# Patient Record
Sex: FEMALE | Race: WHITE | NOT HISPANIC OR LATINO | Employment: OTHER | URBAN - METROPOLITAN AREA
[De-identification: names, ages, dates, MRNs, and addresses within clinical notes are randomized per-mention and may not be internally consistent; named-entity substitution may affect disease eponyms.]

---

## 2017-07-12 ENCOUNTER — OFFICE VISIT (OUTPATIENT)
Dept: FAMILY MEDICINE | Facility: CLINIC | Age: 72
End: 2017-07-12
Payer: COMMERCIAL

## 2017-07-12 VITALS
BODY MASS INDEX: 23.34 KG/M2 | WEIGHT: 154 LBS | HEIGHT: 68 IN | SYSTOLIC BLOOD PRESSURE: 127 MMHG | TEMPERATURE: 96.5 F | DIASTOLIC BLOOD PRESSURE: 85 MMHG | HEART RATE: 99 BPM

## 2017-07-12 DIAGNOSIS — F34.1 DYSTHYMIA: ICD-10-CM

## 2017-07-12 DIAGNOSIS — Z71.89 COUNSELING REGARDING ADVANCED DIRECTIVES: ICD-10-CM

## 2017-07-12 DIAGNOSIS — Z00.00 ENCOUNTER FOR GENERAL ADULT MEDICAL EXAMINATION WITHOUT ABNORMAL FINDINGS: Primary | ICD-10-CM

## 2017-07-12 PROCEDURE — 99397 PER PM REEVAL EST PAT 65+ YR: CPT | Performed by: INTERNAL MEDICINE

## 2017-07-12 ASSESSMENT — ANXIETY QUESTIONNAIRES
2. NOT BEING ABLE TO STOP OR CONTROL WORRYING: NOT AT ALL
IF YOU CHECKED OFF ANY PROBLEMS ON THIS QUESTIONNAIRE, HOW DIFFICULT HAVE THESE PROBLEMS MADE IT FOR YOU TO DO YOUR WORK, TAKE CARE OF THINGS AT HOME, OR GET ALONG WITH OTHER PEOPLE: SOMEWHAT DIFFICULT
GAD7 TOTAL SCORE: 3
1. FEELING NERVOUS, ANXIOUS, OR ON EDGE: NOT AT ALL
5. BEING SO RESTLESS THAT IT IS HARD TO SIT STILL: NOT AT ALL
6. BECOMING EASILY ANNOYED OR IRRITABLE: SEVERAL DAYS
7. FEELING AFRAID AS IF SOMETHING AWFUL MIGHT HAPPEN: NOT AT ALL
3. WORRYING TOO MUCH ABOUT DIFFERENT THINGS: SEVERAL DAYS

## 2017-07-12 ASSESSMENT — PATIENT HEALTH QUESTIONNAIRE - PHQ9: 5. POOR APPETITE OR OVEREATING: SEVERAL DAYS

## 2017-07-12 NOTE — PROGRESS NOTES
SUBJECTIVE:   Patty Zambrano is a 72 year old female who presents for Preventive Visit.      Are you in the first 12 months of your Medicare Part B coverage?  No    Healthy Habits:    Do you get at least three servings of calcium containing foods daily (dairy, green leafy vegetables, etc.)? yes    Amount of exercise or daily activities, outside of work: 4 day(s) per week    Problems taking medications regularly not applicable    Medication side effects: No    Have you had an eye exam in the past two years? yes    Do you see a dentist twice per year? yes    Do you have sleep apnea, excessive snoring or daytime drowsiness?no    COGNITIVE SCREEN  1) Repeat 3 items (Banana, Sunrise, Chair)    2) Clock draw: NORMAL  3) 3 item recall: Recalls 3 objects  Results: 3 items recalled: COGNITIVE IMPAIRMENT LESS LIKELY    Mini-CogTM Copyright S Matt. Licensed by the author for use in [facility]; reprinted with permission (sonia@[facility]). All rights reserved.        Mood: reports intermittent low mood, worse in winter.  Never been on meds in [facility] system.  Has tried Lew-E.  She is not sure she wants medication to help this.  Mood is bothering her more recently.  She thinks she has anxiety and OCD tendencies - is perfectionist. No suicidal/homicidal thoughts.  No panic attacks.        Reviewed and updated as needed this visit by clinical staff  Allergies         Reviewed and updated as needed this visit by Provider        Social History   Substance Use Topics     Smoking status: Never Smoker     Smokeless tobacco: Never Used     Alcohol use Yes      Comment: rare       The patient does not drink >3 drinks per day nor >7 drinks per week.    Today's PHQ-2 Score:   PHQ-2 ( 1999 Pf[employer]) 7/9/2017 6/23/2015   Q1: Little interest or pleasure in doing things 1 0   Q2: Feeling down, depressed or hopeless 1 0   PHQ-2 Score 2 0   Q1: Little interest or pleasure in doing things Several days -   Q2: Feeling down, depressed or  hopeless Several days -   PHQ-2 Score 2 -       Do you feel safe in your environment - Yes    Do you have a Health Care Directive?: Yes: Advance Directive has been received and scanned.    Current providers sharing in care for this patient include:   Patient Care Team:  Jenny Guzman DO as PCP - General (Internal Medicine)      Hearing impairment: No    Ability to successfully perform activities of daily living: Yes, no assistance needed     Fall risk:       Home safety:  none identified      The following health maintenance items are reviewed in Epic and correct as of today:  Health Maintenance   Topic Date Due     HEPATITIS C SCREENING  02/22/1963     FALL RISK ASSESSMENT  06/23/2016     ADVANCE DIRECTIVE PLANNING Q5 YRS  05/11/2017     INFLUENZA VACCINE (SYSTEM ASSIGNED)  09/01/2017     MAMMO SCREEN Q2 YR (SYSTEM ASSIGNED)  06/21/2018     LIPID SCREEN Q5 YR FEMALE (SYSTEM ASSIGNED)  06/13/2019     TETANUS IMMUNIZATION (SYSTEM ASSIGNED)  05/31/2021     COLONOSCOPY Q10 YR  12/15/2026     DEXA SCAN SCREENING (SYSTEM ASSIGNED)  Completed     PNEUMOCOCCAL  Completed     Current Outpatient Prescriptions   Medication Sig Dispense Refill     cycloSPORINE (RESTASIS) 0.05 % ophthalmic emulsion Place 1 drop into both eyes 2 times daily       Eyelid Cleansers (AVENOVA) 0.01 % SOLN Externally apply topically 2 times daily       Probiotic Product (PROBIOTIC ACIDOPHILUS) CAPS        Cholecalciferol (VITAMIN D) 2000 UNITS tablet Take 2,000 Units by mouth daily 100 tablet 3     FISH OIL 1,500 mg. Daily       MULTIVITAMIN OR None Entered       ASPIRIN 81 MG OR TABS 1 TABLET DAILY       CALCIUM + D 600-200 MG-UNIT OR TABS 2 TABLET DAILY       METAMUCIL OR once daily       GLUCOSAMINE CHONDRO COMPLEX 500-400 MG OR TABS 3 daily       fluticasone (FLONASE) 50 MCG/ACT nasal spray Spray 1-2 sprays into both nostrils daily (Patient not taking: Reported on 7/12/2017) 16 g 11         ROS:  Constitutional, HEENT, cardiovascular,  "pulmonary, gi and gu systems are negative, except as otherwise noted.    OBJECTIVE:   There were no vitals taken for this visit. Estimated body mass index is 23.49 kg/(m^2) as calculated from the following:    Height as of 12/15/16: 5' 7\" (1.702 m).    Weight as of 12/15/16: 150 lb (68 kg).  EXAM:   GENERAL: healthy, alert and no distress  EYES: Eyes grossly normal to inspection, PERRL and conjunctivae and sclerae normal  HENT: ear canals and TM's normal, nose and mouth without ulcers or lesions  NECK: no adenopathy, no asymmetry, masses, or scars and thyroid normal to palpation  RESP: lungs clear to auscultation - no rales, rhonchi or wheezes  CV: regular rate and rhythm, normal S1 S2, no S3 or S4, no murmur, click or rub, no peripheral edema and peripheral pulses strong  ABDOMEN: soft, nontender, no hepatosplenomegaly, no masses and bowel sounds normal  MS: no gross musculoskeletal defects noted, no edema  SKIN: no suspicious lesions or rashes  NEURO: Normal strength and tone, mentation intact and speech normal  PSYCH: mentation appears normal, affect normal/bright    ASSESSMENT / PLAN:   1. Encounter for general adult medical examination without abnormal findings    2. Counseling regarding advanced directives  Reviewed advance directive on file from 2011, still relevant    3. Dysthymia - not persistent or severe enough to warrant counseling or Rx meds.  Discussed lifestyle changes, sleep hygiene.  Return to clinic if worsening      End of Life Planning:  Patient currently has an advanced directive: Yes.  Practitioner is supportive of decision.    COUNSELING:  Reviewed preventive health counseling, as reflected in patient instructions      Estimated body mass index is 23.49 kg/(m^2) as calculated from the following:    Height as of 12/15/16: 5' 7\" (1.702 m).    Weight as of 12/15/16: 150 lb (68 kg).     reports that she has never smoked. She has never used smokeless tobacco.      Appropriate preventive services " were discussed with this patient, including applicable screening as appropriate for cardiovascular disease, diabetes, osteopenia/osteoporosis, and glaucoma.  As appropriate for age/gender, discussed screening for colorectal cancer, prostate cancer, breast cancer, and cervical cancer. Checklist reviewing preventive services available has been given to the patient.    Reviewed patients plan of care and provided an AVS. The Basic Care Plan (routine screening as documented in Health Maintenance) for Patty meets the Care Plan requirement. This Care Plan has been established and reviewed with the Patient.    Counseling Resources:  ATP IV Guidelines  Pooled Cohorts Equation Calculator  Breast Cancer Risk Calculator  FRAX Risk Assessment  ICSI Preventive Guidelines  Dietary Guidelines for Americans, 2010  Giv[employer]'s MyPlate  ASA Prophylaxis  Lung CA Screening    Jenny Guzman,   [facility]    Answers for HPI/ROS submitted by the patient on 7/9/2017   Annual Exam:  Getting at least 3 servings of Calcium per day:: Yes  Bi-annual eye exam:: Yes  Dental care twice a year:: Yes  Sleep apnea or symptoms of sleep apnea:: None  Diet:: Regular (no restrictions)  Frequency of exercise:: 4-5 days/week  Taking medications regularly:: Yes  Medication side effects:: None  Additional concerns today:: YES  PHQ-2 Score: 2  Duration of exercise:: Greater than 60 minutes    Answers for HPI/ROS submitted by the patient on 7/9/2017   Annual Exam:  Getting at least 3 servings of Calcium per day:: Yes  Bi-annual eye exam:: Yes  Dental care twice a year:: Yes  Sleep apnea or symptoms of sleep apnea:: None  Diet:: Regular (no restrictions)  Frequency of exercise:: 4-5 days/week  Taking medications regularly:: Yes  Medication side effects:: None  Additional concerns today:: YES  PHQ-2 Score: 2  Duration of exercise:: Greater than 60 minutes

## 2017-07-12 NOTE — PATIENT INSTRUCTIONS
Preventive Health Recommendations    Female Ages 65 +    Yearly exam:     See your health care provider every year in order to  o Review health changes.   o Discuss preventive care.    o Review your medicines if your doctor has prescribed any.      You no longer need a yearly Pap test unless you've had an abnormal Pap test in the past 10 years. If you have vaginal symptoms, such as bleeding or discharge, be sure to talk with your provider about a Pap test.      Every 1 to 2 years, have a mammogram.  If you are over 69, talk with your health care provider about whether or not you want to continue having screening mammograms.      Every 10 years, have a colonoscopy. Or, have a yearly FIT test (stool test). These exams will check for colon cancer.       Have a cholesterol test every 5 years, or more often if your doctor advises it.       Have a diabetes test (fasting glucose) every three years. If you are at risk for diabetes, you should have this test more often.       At age 65, have a bone density scan (DEXA) to check for osteoporosis (brittle bone disease).    Shots:    Get a flu shot each year.    Get a tetanus shot every 10 years.    Talk to your doctor about your pneumonia vaccines. There are now two you should receive - Pneumovax (PPSV 23) and Prevnar (PCV 13).    Talk to your doctor about the shingles vaccine.    Talk to your doctor about the hepatitis B vaccine.    Nutrition:     Eat at least 5 servings of fruits and vegetables each day.      Eat whole-grain bread, whole-wheat pasta and brown rice instead of white grains and rice.      Talk to your provider about Calcium and Vitamin D.     Lifestyle    Exercise at least 150 minutes a week (30 minutes a day, 5 days a week). This will help you control your weight and prevent disease.      Limit alcohol to one drink per day.      No smoking.       Wear sunscreen to prevent skin cancer.       See your dentist twice a year for an exam and cleaning.      See your  eye doctor every 1 to 2 years to screen for conditions such as glaucoma, macular degeneration and cataracts.    1. Melatonin 3 mg, 2 hours prior to bedtime.  Continue with regular physical exercise, good nutrition, social engagement.      Sleep Hygiene    1. Avoid doing anything stressful in the hour before bedtime. Avoid paying bills, watching politics on the news, etc.  2. Avoid screens just before bedtime. This includes cell phones, iPad, computers, TV. The bright lights on the screen is very stimulating to the brain, and makes it difficult to follow asleep and stay asleep  3. Avoid alcohol. Alcohol can sometimes make it easier to fall asleep, but significantly reduces the chance that you will stay asleep. It also impairs REM sleep, which is the good restful sleep  4. Use the bed for sleep and sex only. Avoid eating, reading, watching TV in bed  5. If you feel very restless at bedtime, try doing mundane physical activities such as vacuuming, folding laundry, etc.  6. If you find yourself making lists in your head at night, keep pen and paper at the bedside. Write down these lists. Also visualize yourself checking that item off your list and removing it from your brain.  7. Keep the room cool and dark. Consider investing in late darkening curtains  8. Avoid drinking excessive fluids just before bedtime. Empty your bladder just before bedtime  9. Cognitive behavioral therapy has been proven to be highly effective to treat insomnia. There are several online modules that you can complete for a fee.  Http://shuti.me  Or [email or web address]

## 2017-07-12 NOTE — NURSING NOTE
"Chief Complaint   Patient presents with     Wellness Visit     Blood Draw     not fasting       Initial /85  Pulse 99  Temp 96.5  F (35.8  C) (Tympanic)  Ht 5' 7.5\" (1.715 m)  Wt 154 lb (69.9 kg)  BMI 23.76 kg/m2 Estimated body mass index is 23.76 kg/(m^2) as calculated from the following:    Height as of this encounter: 5' 7.5\" (1.715 m).    Weight as of this encounter: 154 lb (69.9 kg).  Medication Reconciliation: complete  "

## 2017-07-12 NOTE — MR AVS SNAPSHOT
After Visit Summary   7/12/2017    Patty Zambrano    MRN: [id number]           Patient Information     Date Of Birth          1945        Visit Information        Provider Department      7/12/2017 11:20 AM Jenny Guzman, DO [facility]        Today's Diagnoses     Counseling regarding advanced directives    -  1      Care Instructions      Preventive Health Recommendations    Female Ages 65 +    Yearly exam:     See your health care provider every year in order to  o Review health changes.   o Discuss preventive care.    o Review your medicines if your doctor has prescribed any.      You no longer need a yearly Pap test unless you've had an abnormal Pap test in the past 10 years. If you have vaginal symptoms, such as bleeding or discharge, be sure to talk with your provider about a Pap test.      Every 1 to 2 years, have a mammogram.  If you are over 69, talk with your health care provider about whether or not you want to continue having screening mammograms.      Every 10 years, have a colonoscopy. Or, have a yearly FIT test (stool test). These exams will check for colon cancer.       Have a cholesterol test every 5 years, or more often if your doctor advises it.       Have a diabetes test (fasting glucose) every three years. If you are at risk for diabetes, you should have this test more often.       At age 65, have a bone density scan (DEXA) to check for osteoporosis (brittle bone disease).    Shots:    Get a flu shot each year.    Get a tetanus shot every 10 years.    Talk to your doctor about your pneumonia vaccines. There are now two you should receive - Pneumovax (PPSV 23) and Prevnar (PCV 13).    Talk to your doctor about the shingles vaccine.    Talk to your doctor about the hepatitis B vaccine.    Nutrition:     Eat at least 5 servings of fruits and vegetables each day.      Eat whole-grain bread, whole-wheat pasta and brown rice instead of white grains and  rice.      Talk to your provider about Calcium and Vitamin D.     Lifestyle    Exercise at least 150 minutes a week (30 minutes a day, 5 days a week). This will help you control your weight and prevent disease.      Limit alcohol to one drink per day.      No smoking.       Wear sunscreen to prevent skin cancer.       See your dentist twice a year for an exam and cleaning.      See your eye doctor every 1 to 2 years to screen for conditions such as glaucoma, macular degeneration and cataracts.    1. Melatonin 3 mg, 2 hours prior to bedtime.  Continue with regular physical exercise, good nutrition, social engagement.      Sleep Hygiene    1. Avoid doing anything stressful in the hour before bedtime. Avoid paying bills, watching politics on the news, etc.  2. Avoid screens just before bedtime. This includes cell phones, iPad, computers, TV. The bright lights on the screen is very stimulating to the brain, and makes it difficult to follow asleep and stay asleep  3. Avoid alcohol. Alcohol can sometimes make it easier to fall asleep, but significantly reduces the chance that you will stay asleep. It also impairs REM sleep, which is the good restful sleep  4. Use the bed for sleep and sex only. Avoid eating, reading, watching TV in bed  5. If you feel very restless at bedtime, try doing mundane physical activities such as vacuuming, folding laundry, etc.  6. If you find yourself making lists in your head at night, keep pen and paper at the bedside. Write down these lists. Also visualize yourself checking that item off your list and removing it from your brain.  7. Keep the room cool and dark. Consider investing in late darkening curtains  8. Avoid drinking excessive fluids just before bedtime. Empty your bladder just before bedtime  9. Cognitive behavioral therapy has been proven to be highly effective to treat insomnia. There are several online modules that you can complete for a fee.  Http://shuti.me  Or  "[email or web address]             Follow-ups after your visit        Your next 10 appointments already scheduled     Aug 04, 2017  9:30 AM CDT   MA SCREENING DIGITAL BILATERAL with [facility]   [facility] ([facility])    [address] [id number] [phone]           Do not use any powder, lotion or deodorant under your arms or on your breast. If you do, we will ask you to remove it before your exam.  Wear comfortable, two-piece clothing.  If you have any allergies, tell your care team.  Bring any previous mammograms from other facilities or have them mailed to the breast center. Three-dimensional (3D) mammograms are available at [place] locations in [facility] and [place]. Benefits of 3D mammograms include: - Improved rate of cancer detection - Decreases your chance of having to go back for more tests, which means fewer: - \"False-positive\" results (This means that there is an abnormal area but it isn't cancer.) - Invasive testing procedures, such as a biopsy or surgery - Can provide clearer images of the breast if you have dense breast tissue. 3D mammography is an optional exam that anyone can have with a 2D mammogram. It doesn't replace or take the place of a 2D mammogram. 2D mammograms remain an effective screening test for all women.  Not all insurance companies cover the cost of a 3D mammogram. Check with your insurance.              Who to contact     If you have questions or need follow up information about today's clinic visit or your schedule please contact [facility] directly at [phone].  Normal or non-critical lab and imaging results will be communicated to you by MyChart, letter or phone within 4 business days after the clinic has received the results. If you do not hear from us within 7 days, please contact the clinic through MyChart or phone. If you have a critical or abnormal lab result, we will notify you by phone " "as soon as possible.  Submit refill requests through [employer] or call your pharmacy and they will forward the refill request to us. Please allow 3 business days for your refill to be completed.          Additional Information About Your Visit        [employer] Information     [employer] gives you secure access to your electronic health record. If you see a primary care provider, you can also send messages to your care team and make appointments. If you have questions, please call your primary care clinic.  If you do not have a primary care provider, please call [phone] and they will assist you.        Care EveryWhere ID     This is your Care EveryWhere ID. This could be used by other organizations to access your [facility] medical records  [id number]        Your Vitals Were     Pulse Temperature Height BMI (Body Mass Index)          99 96.5  F (35.8  C) (Tympanic) 5' 7.5\" (1.715 m) 23.76 kg/m2         Blood Pressure from Last 3 Encounters:   07/12/17 127/85   12/15/16 94/68   09/02/16 116/73    Weight from Last 3 Encounters:   07/12/17 154 lb (69.9 kg)   12/15/16 150 lb (68 kg)   09/02/16 151 lb (68.5 kg)              Today, you had the following     No orders found for display       Primary Care Provider Office Phone # Fax #    Jenny GuzmanDO [phone]       [facility] [address] [place] [id number]        Equal Access to Services     ERIN ROMERO : Hadii judy mcintyreo Soomaali, waaxda luqadaha, qaybta kaalmada adeegyada, catherine kang. So [facility] [phone].    ATENCIÓN: Si habla español, tiene a haas disposición servicios gratuitos de asistencia lingüística. Devon al [phone].    We comply with applicable federal civil rights laws and [place] laws. We do not discriminate on the basis of race, color, national origin, age, disability sex, sexual orientation or gender identity.            Thank you!     Thank you for choosing [facility] " [facility]  for your care. Our goal is always to provide you with excellent care. Hearing back from our patients is one way we can continue to improve our services. Please take a few minutes to complete the written survey that you may receive in the mail after your visit with us. Thank you!             Your Updated Medication List - Protect others around you: Learn how to safely use, store and throw away your medicines at www.disposemymeds.org.          This list is accurate as of: 7/12/17 12:03 PM.  Always use your most recent med list.                   Brand Name Dispense Instructions for use Diagnosis    aspirin 81 MG tablet      1 TABLET DAILY        AVENOVA 0.01 % Soln      Externally apply topically 2 times daily        calcium + D 600-200 MG-UNIT Tabs   Generic drug:  calcium carbonate-vitamin D      2 TABLET DAILY        cycloSPORINE 0.05 % ophthalmic emulsion    RESTASIS     Place 1 drop into both eyes 2 times daily        FISH OIL      1,500 mg. Daily        fluticasone 50 MCG/ACT spray    FLONASE    16 g    Spray 1-2 sprays into both nostrils daily    Chronic rhinitis       glucosamine chondroitin complex 500-400 MG Tabs   Generic drug:  glucosamine-chondroitinoitin      3 daily        METAMUCIL PO      once daily        MULTIVITAMIN PO      None Entered        PROBIOTIC ACIDOPHILUS BIOBEADS Caps           vitamin D 2000 UNITS tablet     100 tablet    Take 2,000 Units by mouth daily

## 2017-07-13 ASSESSMENT — PATIENT HEALTH QUESTIONNAIRE - PHQ9: SUM OF ALL RESPONSES TO PHQ QUESTIONS 1-9: 3

## 2017-07-13 ASSESSMENT — ANXIETY QUESTIONNAIRES: GAD7 TOTAL SCORE: 3

## 2017-08-04 ENCOUNTER — HOSPITAL ENCOUNTER (OUTPATIENT)
Dept: MAMMOGRAPHY | Facility: CLINIC | Age: 72
Discharge: HOME | End: 2017-08-04
Attending: INTERNAL MEDICINE | Admitting: INTERNAL MEDICINE
Payer: MEDICARE

## 2017-08-04 DIAGNOSIS — Z12.31 VISIT FOR SCREENING MAMMOGRAM: ICD-10-CM

## 2017-08-04 PROCEDURE — G0202 SCR MAMMO BI INCL CAD: HCPCS

## 2017-08-04 PROCEDURE — 77063 BREAST TOMOSYNTHESIS BI: CPT

## 2017-10-17 ENCOUNTER — ALLIED HEALTH/NURSE VISIT (OUTPATIENT)
Dept: FAMILY MEDICINE | Facility: CLINIC | Age: 72
End: 2017-10-17
Payer: COMMERCIAL

## 2017-10-17 DIAGNOSIS — Z23 NEED FOR PROPHYLACTIC VACCINATION AND INOCULATION AGAINST INFLUENZA: Primary | ICD-10-CM

## 2017-10-17 PROCEDURE — 90662 IIV NO PRSV INCREASED AG IM: CPT

## 2017-10-17 PROCEDURE — 99207 ZZC NO CHARGE NURSE ONLY: CPT

## 2017-10-17 PROCEDURE — G0008 ADMIN INFLUENZA VIRUS VAC: HCPCS

## 2017-10-17 NOTE — MR AVS SNAPSHOT
After Visit Summary   10/17/2017    Patty Zambrano    MRN: [id number]           Patient Information     Date Of Birth          1945        Visit Information        Provider Department      10/17/2017 10:30 AM [facility]        Today's Diagnoses     Need for prophylactic vaccination and inoculation against influenza    -  1       Follow-ups after your visit        Who to contact     If you have questions or need follow up information about today's clinic visit or your schedule please contact [facility] directly at [phone].  Normal or non-critical lab and imaging results will be communicated to you by [employer], letter or phone within 4 business days after the clinic has received the results. If you do not hear from us within 7 days, please contact the clinic through [employer] or phone. If you have a critical or abnormal lab result, we will notify you by phone as soon as possible.  Submit refill requests through [employer] or call your pharmacy and they will forward the refill request to us. Please allow 3 business days for your refill to be completed.          Additional Information About Your Visit        MyChart Information     [employer] gives you secure access to your electronic health record. If you see a primary care provider, you can also send messages to your care team and make appointments. If you have questions, please call your primary care clinic.  If you do not have a primary care provider, please call [phone] and they will assist you.        Care EveryWhere ID     This is your Care EveryWhere ID. This could be used by other organizations to access your [place] medical records  [id number]         Blood Pressure from Last 3 Encounters:   07/12/17 127/85   12/15/16 94/68   09/02/16 116/73    Weight from Last 3 Encounters:   07/12/17 154 lb (69.9 kg)   12/15/16 150 lb (68 kg)   09/02/16 151 lb (68.5 kg)              We Performed the  Following     FLU VACCINE, INCREASED ANTIGEN, PRESV FREE, AGE 65+ [20936]     Vaccine Administration, Initial [08606]        Primary Care Provider Office Phone # Fax #    Jenny Guzman [phone] [address] [facility] [address]        Equal Access to Services     ERIN ROMERO : Hadii aad ku hadasho Soomaali, waaxda luqadaha, qaybta kaalmada adeegyada, waxay arletin haymartinn vickie estebanty powers . So [facility] [phone].    ATENCIÓN: Si habla español, tiene a haas disposición servicios gratuitos de asistencia lingüística. Devon al [phone].    We comply with applicable federal civil rights laws and [place] laws. We do not discriminate on the basis of race, color, national origin, age, disability, sex, sexual orientation, or gender identity.            Thank you!     Thank you for choosing [facility]  for your care. Our goal is always to provide you with excellent care. Hearing back from our patients is one way we can continue to improve our services. Please take a few minutes to complete the written survey that you may receive in the mail after your visit with us. Thank you!             Your Updated Medication List - Protect others around you: Learn how to safely use, store and throw away your medicines at www.disposemymeds.org.          This list is accurate as of: 10/17/17 10:31 AM.  Always use your most recent med list.                   Brand Name Dispense Instructions for use Diagnosis    aspirin 81 MG tablet      1 TABLET DAILY        AVENOVA 0.01 % Soln      Externally apply topically 2 times daily        calcium + D 600-200 MG-UNIT Tabs   Generic drug:  calcium carbonate-vitamin D      2 TABLET DAILY        cycloSPORINE 0.05 % ophthalmic emulsion    RESTASIS     Place 1 drop into both eyes 2 times daily        FISH OIL      1,500 mg. Daily        fluticasone 50 MCG/ACT spray    FLONASE    16 g    Spray 1-2 sprays into both nostrils daily    Chronic rhinitis        glucosamine chondroitin complex 500-400 MG Tabs   Generic drug:  glucosamine-chondroitinoitin      3 daily        METAMUCIL PO      once daily        MULTIVITAMIN PO      None Entered        PROBIOTIC ACIDOPHILUS BIOBEADS Caps           vitamin D 2000 UNITS tablet     100 tablet    Take 2,000 Units by mouth daily

## 2017-10-17 NOTE — PROGRESS NOTES
Injectable Influenza Immunization Documentation    1.  Is the person to be vaccinated sick today?   No    2. Does the person to be vaccinated have an allergy to a component   of the vaccine?   No    3. Has the person to be vaccinated ever had a serious reaction   to influenza vaccine in the past?   No    4. Has the person to be vaccinated ever had Guillain-Barré syndrome?   No    Form completed by Nia Roldan CMA  Per orders of Dr. Connelly, injection of flu shot given by Nia Roldan. Patient instructed to remain in clinic for 15 minutes afterwards, and to report any adverse reaction to me immediately.

## 2017-12-04 ENCOUNTER — OFFICE VISIT (OUTPATIENT)
Dept: FAMILY MEDICINE | Facility: CLINIC | Age: 72
End: 2017-12-04
Payer: COMMERCIAL

## 2017-12-04 VITALS
HEIGHT: 68 IN | HEART RATE: 107 BPM | BODY MASS INDEX: 23.19 KG/M2 | SYSTOLIC BLOOD PRESSURE: 132 MMHG | WEIGHT: 153 LBS | TEMPERATURE: 99.4 F | OXYGEN SATURATION: 96 % | DIASTOLIC BLOOD PRESSURE: 77 MMHG

## 2017-12-04 DIAGNOSIS — K57.32 DIVERTICULITIS OF COLON: Primary | ICD-10-CM

## 2017-12-04 PROCEDURE — 99213 OFFICE O/P EST LOW 20 MIN: CPT | Performed by: INTERNAL MEDICINE

## 2017-12-04 RX ORDER — METRONIDAZOLE 500 MG/1
500 TABLET ORAL 3 TIMES DAILY
Qty: 42 TABLET | Refills: 0 | Status: SHIPPED | OUTPATIENT
Start: 2017-12-04 | End: 2017-12-18

## 2017-12-04 RX ORDER — SULFAMETHOXAZOLE/TRIMETHOPRIM 800-160 MG
1 TABLET ORAL 2 TIMES DAILY
Qty: 28 TABLET | Refills: 0 | Status: SHIPPED | OUTPATIENT
Start: 2017-12-04 | End: 2017-12-18

## 2017-12-04 NOTE — PROGRESS NOTES
SUBJECTIVE:   Patty Zambrano is a 72 year old female who presents to clinic today for the following health issues:  Chief Complaint   Patient presents with     Abdominal Pain     x 2 days, similar symptoms to diverticulitis she has had in the past     ABDOMINAL   PAIN     Onset: x 2 days,     Description:   Character: steady dull pain, gas pains every once in a while   Location: pelvic region - seems to be bilateral   Radiation: Back    Intensity: mild, moderate    Progression of Symptoms:  Same or maybe a little better     Accompanying Signs & Symptoms:  Fever/Chills?: YES- chills   Gas/Bloating: YES- at the onset   Nausea: no   Vomitting: no   Diarrhea?: no   Constipation:no   Dysuria or Hematuria: no    History:   Trauma: no   Previous similar pain: YES- has had 2 episodes of diverticulitis, 10 years ago 2015 and 2016     Previous tests done: Colonoscopy Dec. 2016    Precipitating factors:   Does the pain change with: Laying down makes symptoms worse.     Food: no      BM: YES- seems to help     Urination: no     Alleviating factors:  None     Therapies Tried and outcome: Tylenol seems to help.      LMP:  not applicable       Patty has had diverticulitis twice in the past and her current symptoms feel the same.  She had changed her diet to mostly clears with some crackers.  Her first episode of diverticulitis she was treated with Flagyl and Cipro, but she developed a lot of hip pain from the Cipro, so the second time she was treated with Flagyl and Bactrim.      Problem list and histories reviewed & adjusted, as indicated.  Additional history: as documented    Patient Active Problem List   Diagnosis     Postmenopausal atrophic vaginitis     Sinusitis, chronic     Osteopenia     Hypovitaminosis D     HYPERLIPIDEMIA LDL GOAL <160     HCD (health care directive)     Advanced directives, counseling/discussion     Cataract     Sigmoid diverticulitis     Dysthymia     Past Surgical History:   Procedure Laterality Date      COLONOSCOPY  4/3/2014    Procedure: COLONOSCOPY;  Colonoscopy;  Surgeon: Richie Kwon MD;  Location: [facility]     COLONOSCOPY N/A 12/15/2016    Procedure: COLONOSCOPY;  Surgeon: Richie Kwon MD;  Location: [facility]     EYE SURGERY  03/2017    laser     HC COLONOSCOPY THRU STOMA, DIAGNOSTIC  11/03    due 10 years     HC DILATION/CURETTAGE DIAG/THER NON OB       SURGICAL HISTORY OF -   05/05    right knee arthroscopy       Social History   Substance Use Topics     Smoking status: Never Smoker     Smokeless tobacco: Never Used     Alcohol use Yes      Comment: rare     Family History   Problem Relation Age of Onset     Hypertension Mother      OSTEOPOROSIS Mother      CANCER Maternal Grandmother      abdominal CA     HEART DISEASE Maternal Grandfather      Alcohol/Drug Paternal Grandfather      CANCER Brother      sarcoma     Arthritis Father      Alzheimer Disease Father          Current Outpatient Prescriptions   Medication Sig Dispense Refill     sulfamethoxazole-trimethoprim (BACTRIM DS/SEPTRA DS) 800-160 MG per tablet Take 1 tablet by mouth 2 times daily for 14 days 28 tablet 0     metroNIDAZOLE (FLAGYL) 500 MG tablet Take 1 tablet (500 mg) by mouth 3 times daily for 14 days 42 tablet 0     cycloSPORINE (RESTASIS) 0.05 % ophthalmic emulsion Place 1 drop into both eyes 2 times daily       Probiotic Product (PROBIOTIC ACIDOPHILUS) CAPS        Cholecalciferol (VITAMIN D) 2000 UNITS tablet Take 2,000 Units by mouth daily 100 tablet 3     FISH OIL 1,500 mg. Daily       MULTIVITAMIN OR None Entered       ASPIRIN 81 MG OR TABS 1 TABLET DAILY       CALCIUM + D 600-200 MG-UNIT OR TABS 2 TABLET DAILY       METAMUCIL OR once daily       GLUCOSAMINE CHONDRO COMPLEX 500-400 MG OR TABS 3 daily           Reviewed and updated as needed this visit by clinical staffTobacco  Allergies  Med Hx  Surg Hx  Fam Hx  Soc Hx      Reviewed and updated as needed this visit by Provider         ROS:  Constitutional, gi and gu systems are  "negative, except as otherwise noted.      OBJECTIVE:   /77 (BP Location: Right arm, Patient Position: Chair, Cuff Size: Adult Regular)  Pulse 107  Temp 99.4  F (37.4  C) (Tympanic)  Ht 5' 7.5\" (1.715 m)  Wt 153 lb (69.4 kg)  SpO2 96%  BMI 23.61 kg/m2  Body mass index is 23.61 kg/(m^2).    GENERAL: healthy, alert and no distress  RESP: lungs clear to auscultation - no rales, rhonchi or wheezes  CV: regular rate and rhythm, normal S1 S2, no S3 or S4, no murmur, click or rub  ABDOMEN: soft, mild low abdomen tenderness, no rebound or guarding, no hepatosplenomegaly, no masses and bowel sounds normal      ASSESSMENT/PLAN:       1. Diverticulitis of colon    Patty presents with symptoms consistent with diverticulitis, which she has had in the past.  Discussed that definitive diagnosis would involve labs and CT scan.  She is hesitant to keep getting so many CT scans and wonders if empiric treatment would be reasonable instead.  Since her symptoms are the same as her prior episodes and currently fairly mild, this does seem reasonable.  Will treat with Flagyl and Bactrim (had reaction to Cipro previously), but if she develops any new/worsening symptoms, would then proceed with work-up.  Counseled her to just take clears for a couple of days and then gradually increase diet as tolerated.      - sulfamethoxazole-trimethoprim (BACTRIM DS/SEPTRA DS) 800-160 MG per tablet; Take 1 tablet by mouth 2 times daily for 14 days  Dispense: 28 tablet; Refill: 0  - metroNIDAZOLE (FLAGYL) 500 MG tablet; Take 1 tablet (500 mg) by mouth 3 times daily for 14 days  Dispense: 42 tablet; Refill: 0      Silas Cortez MD  [facility]  "

## 2017-12-04 NOTE — PATIENT INSTRUCTIONS
If symptoms are not improving in a few days or you develop new symptoms, please let me know and we may need to think about doing some investigation for other possible causes.    Stick with the clear liquid diet today and tomorrow and then gradually add in some bland foods.

## 2017-12-04 NOTE — NURSING NOTE
"Chief Complaint   Patient presents with     Abdominal Pain     x 2 days, similar symptoms to diverticulitis she has had in the past       Initial /77 (BP Location: Right arm, Patient Position: Chair, Cuff Size: Adult Regular)  Pulse 107  Temp 99.4  F (37.4  C) (Tympanic)  Ht 5' 7.5\" (1.715 m)  Wt 153 lb (69.4 kg)  SpO2 96%  BMI 23.61 kg/m2 Estimated body mass index is 23.61 kg/(m^2) as calculated from the following:    Height as of this encounter: 5' 7.5\" (1.715 m).    Weight as of this encounter: 153 lb (69.4 kg).  Medication Reconciliation: complete   Mica BUENO CMA ([facility])    "

## 2017-12-04 NOTE — MR AVS SNAPSHOT
After Visit Summary   12/4/2017    Patty Zambrano    MRN: [id number]           Patient Information     Date Of Birth          1945        Visit Information        Provider Department      12/4/2017 2:00 PM Silas Cortez MD [facility]        Today's Diagnoses     Diverticulitis of colon    -  1      Care Instructions    If symptoms are not improving in a few days or you develop new symptoms, please let me know and we may need to think about doing some investigation for other possible causes.    Stick with the clear liquid diet today and tomorrow and then gradually add in some bland foods.            Follow-ups after your visit        Who to contact     If you have questions or need follow up information about today's clinic visit or your schedule please contact [facility] directly at [phone].  Normal or non-critical lab and imaging results will be communicated to you by MyChart, letter or phone within 4 business days after the clinic has received the results. If you do not hear from us within 7 days, please contact the clinic through MyChart or phone. If you have a critical or abnormal lab result, we will notify you by phone as soon as possible.  Submit refill requests through [employer] or call your pharmacy and they will forward the refill request to us. Please allow 3 business days for your refill to be completed.          Additional Information About Your Visit        MyChart Information     [employer] gives you secure access to your electronic health record. If you see a primary care provider, you can also send messages to your care team and make appointments. If you have questions, please call your primary care clinic.  If you do not have a primary care provider, please call [phone] and they will assist you.        Care EveryWhere ID     This is your Care EveryWhere ID. This could be used by other organizations to access your [facility]  "records  [id number]        Your Vitals Were     Pulse Temperature Height Pulse Oximetry BMI (Body Mass Index)       107 99.4  F (37.4  C) (Tympanic) 5' 7.5\" (1.715 m) 96% 23.61 kg/m2        Blood Pressure from Last 3 Encounters:   12/04/17 132/77   07/12/17 127/85   12/15/16 94/68    Weight from Last 3 Encounters:   12/04/17 153 lb (69.4 kg)   07/12/17 154 lb (69.9 kg)   12/15/16 150 lb (68 kg)              Today, you had the following     No orders found for display         Today's Medication Changes          These changes are accurate as of: 12/4/17  2:39 PM.  If you have any questions, ask your nurse or doctor.               Start taking these medicines.        Dose/Directions    metroNIDAZOLE 500 MG tablet   Commonly known as:  FLAGYL   Used for:  Diverticulitis of colon   Started by:  Silas Cortez MD        Dose:  500 mg   Take 1 tablet (500 mg) by mouth 3 times daily for 14 days   Quantity:  42 tablet   Refills:  0       sulfamethoxazole-trimethoprim 800-160 MG per tablet   Commonly known as:  BACTRIM DS/SEPTRA DS   Used for:  Diverticulitis of colon   Started by:  Silas Cortez MD        Dose:  1 tablet   Take 1 tablet by mouth 2 times daily for 14 days   Quantity:  28 tablet   Refills:  0            Where to get your medicines      [place] medications [place] [facility] [address] [address]  [address] [address]     Phone:  [phone]     metroNIDAZOLE 500 MG tablet    sulfamethoxazole-trimethoprim 800-160 MG per tablet                Primary Care Provider Office Phone # Fax #    Jenny Gaye Guzman [phone]       [address] [facility] [address]        Equal Access to Services     ERIN ROMERO AH: Josh Costa, david aparicio, qadaniata kaalmaabhay quigley, catherine kang. So [facility] [phone].    ATENCIÓN: Si habla español, tiene a haas disposición servicios gratuitos de asistencia " lingüística. Devon al [phone].    We comply with applicable federal civil rights laws and [place] laws. We do not discriminate on the basis of race, color, national origin, age, disability, sex, sexual orientation, or gender identity.            Thank you!     Thank you for choosing [facility]  for your care. Our goal is always to provide you with excellent care. Hearing back from our patients is one way we can continue to improve our services. Please take a few minutes to complete the written survey that you may receive in the mail after your visit with us. Thank you!             Your Updated Medication List - Protect others around you: Learn how to safely use, store and throw away your medicines at www.disposemymeds.org.          This list is accurate as of: 12/4/17  2:39 PM.  Always use your most recent med list.                   Brand Name Dispense Instructions for use Diagnosis    aspirin 81 MG tablet      1 TABLET DAILY        calcium + D 600-200 MG-UNIT Tabs   Generic drug:  calcium carbonate-vitamin D      2 TABLET DAILY        cycloSPORINE 0.05 % ophthalmic emulsion    RESTASIS     Place 1 drop into both eyes 2 times daily        FISH OIL      1,500 mg. Daily        glucosamine chondroitin complex 500-400 MG Tabs   Generic drug:  glucosamine-chondroitinoitin      3 daily        METAMUCIL PO      once daily        metroNIDAZOLE 500 MG tablet    FLAGYL    42 tablet    Take 1 tablet (500 mg) by mouth 3 times daily for 14 days    Diverticulitis of colon       MULTIVITAMIN PO      None Entered        PROBIOTIC ACIDOPHILUS BIOBEADS Caps           sulfamethoxazole-trimethoprim 800-160 MG per tablet    BACTRIM DS/SEPTRA DS    28 tablet    Take 1 tablet by mouth 2 times daily for 14 days    Diverticulitis of colon       vitamin D 2000 UNITS tablet     100 tablet    Take 2,000 Units by mouth daily

## 2018-06-06 DIAGNOSIS — L65.9 NON-SCARRING HAIR LOSS: Primary | ICD-10-CM

## 2018-06-06 LAB
BASOPHILS # BLD AUTO: 0.1 10E9/L (ref 0–0.2)
BASOPHILS NFR BLD AUTO: 0.8 %
DIFFERENTIAL METHOD BLD: NORMAL
EOSINOPHIL # BLD AUTO: 0.2 10E9/L (ref 0–0.7)
EOSINOPHIL NFR BLD AUTO: 2.4 %
ERYTHROCYTE [DISTWIDTH] IN BLOOD BY AUTOMATED COUNT: 12.5 % (ref 10–15)
FERRITIN SERPL-MCNC: 49 NG/ML (ref 8–252)
HCT VFR BLD AUTO: 42.2 % (ref 35–47)
HGB BLD-MCNC: 14.1 G/DL (ref 11.7–15.7)
IRON SATN MFR SERPL: 34 % (ref 15–46)
IRON SERPL-MCNC: 122 UG/DL (ref 35–180)
LYMPHOCYTES # BLD AUTO: 2.4 10E9/L (ref 0.8–5.3)
LYMPHOCYTES NFR BLD AUTO: 30.8 %
MCH RBC QN AUTO: 32.3 PG (ref 26.5–33)
MCHC RBC AUTO-ENTMCNC: 33.4 G/DL (ref 31.5–36.5)
MCV RBC AUTO: 97 FL (ref 78–100)
MONOCYTES # BLD AUTO: 0.5 10E9/L (ref 0–1.3)
MONOCYTES NFR BLD AUTO: 6.3 %
NEUTROPHILS # BLD AUTO: 4.6 10E9/L (ref 1.6–8.3)
NEUTROPHILS NFR BLD AUTO: 59.7 %
PLATELET # BLD AUTO: 282 10E9/L (ref 150–450)
RBC # BLD AUTO: 4.37 10E12/L (ref 3.8–5.2)
T4 FREE SERPL-MCNC: 0.84 NG/DL (ref 0.76–1.46)
TIBC SERPL-MCNC: 354 UG/DL (ref 240–430)
TSH SERPL DL<=0.005 MIU/L-ACNC: 1.62 MU/L (ref 0.4–4)
WBC # BLD AUTO: 7.6 10E9/L (ref 4–11)

## 2018-06-06 PROCEDURE — 84439 ASSAY OF FREE THYROXINE: CPT | Performed by: DERMATOLOGY

## 2018-06-06 PROCEDURE — 82306 VITAMIN D 25 HYDROXY: CPT | Performed by: DERMATOLOGY

## 2018-06-06 PROCEDURE — 82728 ASSAY OF FERRITIN: CPT | Performed by: DERMATOLOGY

## 2018-06-06 PROCEDURE — 36415 COLL VENOUS BLD VENIPUNCTURE: CPT | Performed by: DERMATOLOGY

## 2018-06-06 PROCEDURE — 84443 ASSAY THYROID STIM HORMONE: CPT | Performed by: DERMATOLOGY

## 2018-06-06 PROCEDURE — 83550 IRON BINDING TEST: CPT | Performed by: DERMATOLOGY

## 2018-06-06 PROCEDURE — 99000 SPECIMEN HANDLING OFFICE-LAB: CPT | Performed by: DERMATOLOGY

## 2018-06-06 PROCEDURE — 84630 ASSAY OF ZINC: CPT | Mod: 90 | Performed by: DERMATOLOGY

## 2018-06-06 PROCEDURE — 85025 COMPLETE CBC W/AUTO DIFF WBC: CPT | Performed by: DERMATOLOGY

## 2018-06-06 PROCEDURE — 83540 ASSAY OF IRON: CPT | Performed by: DERMATOLOGY

## 2018-06-07 LAB — DEPRECATED CALCIDIOL+CALCIFEROL SERPL-MC: 45 UG/L (ref 20–75)

## 2018-06-08 LAB — ZINC SERPL-MCNC: 76 UG/DL (ref 60–120)

## 2018-08-29 ENCOUNTER — OFFICE VISIT (OUTPATIENT)
Dept: FAMILY MEDICINE | Facility: CLINIC | Age: 73
End: 2018-08-29
Payer: COMMERCIAL

## 2018-08-29 ENCOUNTER — HOSPITAL ENCOUNTER (OUTPATIENT)
Dept: MAMMOGRAPHY | Facility: CLINIC | Age: 73
Discharge: HOME | End: 2018-08-29
Attending: INTERNAL MEDICINE | Admitting: INTERNAL MEDICINE
Payer: MEDICARE

## 2018-08-29 VITALS
SYSTOLIC BLOOD PRESSURE: 126 MMHG | HEART RATE: 80 BPM | WEIGHT: 150 LBS | BODY MASS INDEX: 23.54 KG/M2 | TEMPERATURE: 97.3 F | DIASTOLIC BLOOD PRESSURE: 72 MMHG | OXYGEN SATURATION: 99 % | HEIGHT: 67 IN

## 2018-08-29 DIAGNOSIS — M25.862 KNEE JOINT CYST, LEFT: ICD-10-CM

## 2018-08-29 DIAGNOSIS — Z11.59 NEED FOR HEPATITIS C SCREENING TEST: ICD-10-CM

## 2018-08-29 DIAGNOSIS — E78.5 HYPERLIPIDEMIA LDL GOAL <160: ICD-10-CM

## 2018-08-29 DIAGNOSIS — Z13.1 SCREENING FOR DIABETES MELLITUS: ICD-10-CM

## 2018-08-29 DIAGNOSIS — Z12.31 VISIT FOR SCREENING MAMMOGRAM: ICD-10-CM

## 2018-08-29 DIAGNOSIS — Z00.00 ENCOUNTER FOR GENERAL ADULT MEDICAL EXAMINATION WITHOUT ABNORMAL FINDINGS: Primary | ICD-10-CM

## 2018-08-29 PROCEDURE — 77067 SCR MAMMO BI INCL CAD: CPT

## 2018-08-29 PROCEDURE — 99397 PER PM REEVAL EST PAT 65+ YR: CPT | Performed by: INTERNAL MEDICINE

## 2018-08-29 PROCEDURE — 77063 BREAST TOMOSYNTHESIS BI: CPT

## 2018-08-29 NOTE — MR AVS SNAPSHOT
After Visit Summary   8/29/2018    Patty Zambrano    MRN: [id number]           Patient Information     Date Of Birth          1945        Visit Information        Provider Department      8/29/2018 9:40 AM Jenny Guzman,  [facility]        Today's Diagnoses     Encounter for general adult medical examination without abnormal findings    -  1    Hyperlipidemia LDL goal <160        Need for hepatitis C screening test        Screening for diabetes mellitus        Knee joint cyst, left          Care Instructions      Preventive Health Recommendations    Female Ages 65 +    Yearly exam:     See your health care provider every year in order to  o Review health changes.   o Discuss preventive care.    o Review your medicines if your doctor has prescribed any.      You no longer need a yearly Pap test unless you've had an abnormal Pap test in the past 10 years. If you have vaginal symptoms, such as bleeding or discharge, be sure to talk with your provider about a Pap test.      Every 1 to 2 years, have a mammogram.  If you are over 69, talk with your health care provider about whether or not you want to continue having screening mammograms.      Every 10 years, have a colonoscopy. Or, have a yearly FIT test (stool test). These exams will check for colon cancer.       Have a cholesterol test every 5 years, or more often if your doctor advises it.       Have a diabetes test (fasting glucose) every three years. If you are at risk for diabetes, you should have this test more often.       At age 65, have a bone density scan (DEXA) to check for osteoporosis (brittle bone disease).    Shots:    Get a flu shot each year.    Get a tetanus shot every 10 years.    Talk to your doctor about your pneumonia vaccines. There are now two you should receive - Pneumovax (PPSV 23) and Prevnar (PCV 13).    Talk to your pharmacist about the shingles vaccine.    Talk to your doctor about the hepatitis B  vaccine.    Nutrition:     Eat at least 5 servings of fruits and vegetables each day.      Eat whole-grain bread, whole-wheat pasta and brown rice instead of white grains and rice.      Get adequate Calcium and Vitamin D.     Lifestyle    Exercise at least 150 minutes a week (30 minutes a day, 5 days a week). This will help you control your weight and prevent disease.      Limit alcohol to one drink per day.      No smoking.       Wear sunscreen to prevent skin cancer.       See your dentist twice a year for an exam and cleaning.      See your eye doctor every 1 to 2 years to screen for conditions such as glaucoma, macular degeneration and cataracts.    1. Come back for fasting labs.  2. Referral to Ortho          Follow-ups after your visit        Additional Services     ORTHO  REFERRAL       [facility] is referring you to the Orthopedic  Services at [facility].       The  Representative will assist you in the coordination of your Orthopedic and Musculoskeletal Care as prescribed by your physician.    The  Representative will call you within 1 business day to help schedule your appointment, or you may contact the  Representative at:    All areas ~ [phone]     Type of Referral : Surgical / Specialist       Timeframe requested: Routine    Coverage of these services is subject to the terms and limitations of your health insurance plan.  Please call member services at your health plan with any benefit or coverage questions.      If X-rays, CT or MRI's have been performed, please contact the facility where they were done to arrange for , prior to your scheduled appointment.  Please bring this referral request to your appointment and present it to your specialist.                  Follow-up notes from your care team     Return in about 1 year (around 8/29/2019) for Physical Exam.      Future tests that were ordered for you  "today     Open Future Orders        Priority Expected Expires Ordered    Hepatitis C Screen Reflex to HCV RNA Quant and Genotype Routine  8/29/2019 8/29/2018    Lipid Profile (Chol, Trig, HDL, LDL calc) Routine  8/29/2019 8/29/2018    Basic metabolic panel Routine  8/29/2019 8/29/2018    MA Screen Bilateral w/Farhat Routine  7/16/2019 7/16/2018            Who to contact     If you have questions or need follow up information about today's clinic visit or your schedule please contact [facility] directly at [phone].  Normal or non-critical lab and imaging results will be communicated to you by ClearPoint Learning Systemshart, letter or phone within 4 business days after the clinic has received the results. If you do not hear from us within 7 days, please contact the clinic through TixAlertt or phone. If you have a critical or abnormal lab result, we will notify you by phone as soon as possible.  Submit refill requests through Remote or call your pharmacy and they will forward the refill request to us. Please allow 3 business days for your refill to be completed.          Additional Information About Your Visit        MyChart Information     Remote gives you secure access to your electronic health record. If you see a primary care provider, you can also send messages to your care team and make appointments. If you have questions, please call your primary care clinic.  If you do not have a primary care provider, please call [phone] and they will assist you.        Care EveryWhere ID     This is your Care EveryWhere ID. This could be used by other organizations to access your [place] medical records  [id number]        Your Vitals Were     Pulse Temperature Height Pulse Oximetry Breastfeeding? BMI (Body Mass Index)    80 97.3  F (36.3  C) (Tympanic) 5' 6.93\" (1.7 m) 99% No 23.54 kg/m2       Blood Pressure from Last 3 Encounters:   08/29/18 126/72   12/04/17 132/77   07/12/17 127/85    Weight from Last 3 Encounters: "   08/29/18 150 lb (68 kg)   12/04/17 153 lb (69.4 kg)   07/12/17 154 lb (69.9 kg)              We Performed the Following     ORTHO  REFERRAL        Primary Care Provider Office Phone # Fax #    Jenny Guzman [phone] [address] [facility] [address]        Equal Access to Services     ERIN ROMERO : Hadii aad ku hadasho Soomaali, waaxda luqadaha, qaybta kaalmada adeegyada, waxay idiin hayaan adeeg kharash la'aan ah. So [facility] [phone].    ATENCIÓN: Si vidhi durham, tiene a haas disposición servicios gratuitos de asistencia lingüística. Llame al [phone].    We comply with applicable federal civil rights laws and [place] laws. We do not discriminate on the basis of race, color, national origin, age, disability, sex, sexual orientation, or gender identity.            Thank you!     Thank you for choosing [facility]  for your care. Our goal is always to provide you with excellent care. Hearing back from our patients is one way we can continue to improve our services. Please take a few minutes to complete the written survey that you may receive in the mail after your visit with us. Thank you!             Your Updated Medication List - Protect others around you: Learn how to safely use, store and throw away your medicines at www.disposemymeds.org.          This list is accurate as of 8/29/18  9:58 AM.  Always use your most recent med list.                   Brand Name Dispense Instructions for use Diagnosis    aspirin 81 MG tablet      1 TABLET DAILY        calcium + D 600-200 MG-UNIT Tabs   Generic drug:  calcium carbonate-vitamin D      2 TABLET DAILY        cycloSPORINE 0.05 % ophthalmic emulsion    RESTASIS     Place 1 drop into both eyes 2 times daily        FISH OIL      1,500 mg. Daily        glucosamine chondroitin complex 500-400 MG Tabs   Generic drug:  glucosamine-chondroitinoitin      3 daily        MELATONIN PO      Take 3 mg by mouth         METAMUCIL PO      once daily        minoxidil 2 % external solution    ROGAINE     Apply topically 2 times daily 2%        MULTIVITAMIN PO      None Entered        PROBIOTIC ACIDOPHILUS BIOBEADS Caps           vitamin D 2000 units tablet     100 tablet    Take 2,000 Units by mouth daily

## 2018-08-29 NOTE — PROGRESS NOTES
SUBJECTIVE:   Patty Zambrano is a 73 year old female who presents for Preventive Visit.    Are you in the first 12 months of your Medicare Part B coverage?  No    Healthy Habits:    Do you get at least three servings of calcium containing foods daily (dairy, green leafy vegetables, etc.)? yes    Amount of exercise or daily activities, outside of work: 5 day(s) per week    Problems taking medications regularly No    Medication side effects: No    Have you had an eye exam in the past two years? yes    Do you see a dentist twice per year? yes    Do you have sleep apnea, excessive snoring or daytime drowsiness?no      Ability to successfully perform activities of daily living: Yes, no assistance needed    Home safety:  throw rugs in the hallway and lack of handrails on stairs     Hearing impairment: No    Fall risk:  Fallen 2 or more times in the past year?: No  Any fall with injury in the past year?: No        COGNITIVE SCREEN  1) Repeat 3 items (Leader, Season, Table)  All 3 words repeated  2) Clock draw: NORMAL  3) 3 item recall: Recalls 3 objects  Results: NORMAL clock, 1-2 items recalled: COGNITIVE IMPAIRMENT LESS LIKELY    Mini-CogTM Copyright S Matt. Licensed by the author for use in [facility]; reprinted with permission (sonia@[facility]). All rights reserved.          Left knee has a mass potentially, also its been bothering the pt for the past couple months or longer. Pt does do some exercises and it helps, but she would like to have the PCP look it.No swelling present, 3/10 pain intermittently.  Pain worsens with increased activity.  Will use Tylenol or NSAIDs.  Is doing exercises that her  was taught after knee replacement.  These help significantly.    She is not fasting today.      Reviewed and updated as needed this visit by clinical staff  Tobacco  Allergies  Meds  Problems  Med Hx  Surg Hx  Fam Hx  Soc Hx          Reviewed and updated as needed this visit by Provider  Allergies   Meds  Problems        Social History   Substance Use Topics     Smoking status: Never Smoker     Smokeless tobacco: Never Used     Alcohol use Yes      Comment: rare       If you drink alcohol do you typically have >3 drinks per day or >7 drinks per week? No                        Today's PHQ-2 Score:   PHQ-2 ( 1999 Pfizer) 8/29/2018 7/9/2017   Q1: Little interest or pleasure in doing things 0 1   Q2: Feeling down, depressed or hopeless 0 1   PHQ-2 Score 0 2   Q1: Little interest or pleasure in doing things - Several days   Q2: Feeling down, depressed or hopeless - Several days   PHQ-2 Score - 2       Do you feel safe in your environment - Yes    Do you have a Health Care Directive?: Yes: Advance Directive has been received and scanned.    Current providers sharing in care for this patient include:   Patient Care Team:  Jenny Guzman DO as PCP - General (Internal Medicine)    The following health maintenance items are reviewed in Epic and correct as of today:  Health Maintenance   Topic Date Due     HEPATITIS C SCREENING  02/22/1965     FALL RISK ASSESSMENT  07/12/2018     INFLUENZA VACCINE (1) 09/01/2018     LIPID SCREEN Q5 YR FEMALE (SYSTEM ASSIGNED)  06/13/2019     MAMMO SCREEN Q2 YR (SYSTEM ASSIGNED)  08/04/2019     TETANUS IMMUNIZATION (SYSTEM ASSIGNED)  05/31/2021     ADVANCE DIRECTIVE PLANNING Q5 YRS  07/12/2022     COLONOSCOPY Q10 YR  12/15/2026     DEXA SCAN SCREENING (SYSTEM ASSIGNED)  Completed     PNEUMOCOCCAL  Completed     Current Outpatient Prescriptions   Medication Sig Dispense Refill     ASPIRIN 81 MG OR TABS 1 TABLET DAILY       CALCIUM + D 600-200 MG-UNIT OR TABS 2 TABLET DAILY       Cholecalciferol (VITAMIN D) 2000 UNITS tablet Take 2,000 Units by mouth daily 100 tablet 3     cycloSPORINE (RESTASIS) 0.05 % ophthalmic emulsion Place 1 drop into both eyes 2 times daily       FISH OIL 1,500 mg. Daily       GLUCOSAMINE CHONDRO COMPLEX 500-400 MG OR TABS 3 daily       MELATONIN PO Take 3  "mg by mouth       METAMUCIL OR once daily       minoxidil (ROGAINE) 2 % external solution Apply topically 2 times daily 2%       MULTIVITAMIN OR None Entered       Probiotic Product (PROBIOTIC ACIDOPHILUS) CAPS            ROS:  Constitutional, HEENT, cardiovascular, pulmonary, GI, , musculoskeletal, neuro, skin, endocrine and psych systems are negative, except as otherwise noted.    OBJECTIVE:   /72 (BP Location: Right arm, Patient Position: Sitting, Cuff Size: Adult Regular)  Pulse 80  Temp 97.3  F (36.3  C) (Tympanic)  Ht 5' 6.93\" (1.7 m)  Wt 150 lb (68 kg)  SpO2 99%  Breastfeeding? No  BMI 23.54 kg/m2 Estimated body mass index is 23.54 kg/(m^2) as calculated from the following:    Height as of this encounter: 5' 6.93\" (1.7 m).    Weight as of this encounter: 150 lb (68 kg).  EXAM:   GENERAL: healthy, alert and no distress  EYES: Eyes grossly normal to inspection, PERRL and conjunctivae and sclerae normal  HENT: ear canals and TM's normal, nose and mouth without ulcers or lesions  NECK: no adenopathy, no asymmetry, masses, or scars and thyroid normal to palpation  RESP: lungs clear to auscultation - no rales, rhonchi or wheezes  CV: regular rate and rhythm, normal S1 S2, no S3 or S4, no murmur, click or rub, no peripheral edema and peripheral pulses strong  ABDOMEN: soft, nontender, no hepatosplenomegaly, no masses and bowel sounds normal  MS: in left medial knee there is non-tender 5 cm well circumscribed mass.  Normal skin overlying mass  SKIN: no suspicious lesions or rashes  NEURO: Normal strength and tone, mentation intact and speech normal  PSYCH: mentation appears normal, affect normal/bright      ASSESSMENT / PLAN:   1. Encounter for general adult medical examination without abnormal findings    2. Knee joint cyst, left - cyst vs lipoma.  Patient wants to see Ortho and get imaging there, referral made  - ORTHO  REFERRAL    3. Hyperlipidemia LDL goal <160 check labs  - Lipid Profile " "(Chol, Trig, HDL, LDL calc); Future    4. Need for hepatitis C screening test - check labs  - Hepatitis C Screen Reflex to HCV RNA Quant and Genotype; Future    5. Screening for diabetes mellitus - check labs  - Basic metabolic panel; Future    End of Life Planning:  Patient currently has an advanced directive: Yes.  Practitioner is supportive of decision.    COUNSELING:  Reviewed preventive health counseling, as reflected in patient instructions    BP Readings from Last 1 Encounters:   08/29/18 126/72     Estimated body mass index is 23.54 kg/(m^2) as calculated from the following:    Height as of this encounter: 5' 6.93\" (1.7 m).    Weight as of this encounter: 150 lb (68 kg).           reports that she has never smoked. She has never used smokeless tobacco.      Appropriate preventive services were discussed with this patient, including applicable screening as appropriate for cardiovascular disease, diabetes, osteopenia/osteoporosis, and glaucoma.  As appropriate for age/gender, discussed screening for colorectal cancer, prostate cancer, breast cancer, and cervical cancer. Checklist reviewing preventive services available has been given to the patient.    Reviewed patients plan of care and provided an AVS. The Basic Care Plan (routine screening as documented in Health Maintenance) for Patty meets the Care Plan requirement. This Care Plan has been established and reviewed with the Patient.    Counseling Resources:  ATP IV Guidelines  Pooled Cohorts Equation Calculator  Breast Cancer Risk Calculator  FRAX Risk Assessment  ICSI Preventive Guidelines  Dietary Guidelines for Americans, 2010  USDA's MyPlate  ASA Prophylaxis  Lung CA Screening    Jenny Guzman,   [facility]  "

## 2018-08-29 NOTE — PATIENT INSTRUCTIONS

## 2018-09-04 DIAGNOSIS — Z13.1 SCREENING FOR DIABETES MELLITUS: ICD-10-CM

## 2018-09-04 DIAGNOSIS — Z11.59 NEED FOR HEPATITIS C SCREENING TEST: ICD-10-CM

## 2018-09-04 DIAGNOSIS — E78.5 HYPERLIPIDEMIA LDL GOAL <160: ICD-10-CM

## 2018-09-04 LAB
ANION GAP SERPL CALCULATED.3IONS-SCNC: 4 MMOL/L (ref 3–14)
BUN SERPL-MCNC: 18 MG/DL (ref 7–30)
CALCIUM SERPL-MCNC: 9.5 MG/DL (ref 8.5–10.1)
CHLORIDE SERPL-SCNC: 107 MMOL/L (ref 94–109)
CHOLEST SERPL-MCNC: 236 MG/DL
CO2 SERPL-SCNC: 29 MMOL/L (ref 20–32)
CREAT SERPL-MCNC: 0.84 MG/DL (ref 0.52–1.04)
GFR SERPL CREATININE-BSD FRML MDRD: 67 ML/MIN/1.7M2
GLUCOSE SERPL-MCNC: 88 MG/DL (ref 70–99)
HDLC SERPL-MCNC: 76 MG/DL
LDLC SERPL CALC-MCNC: 142 MG/DL
NONHDLC SERPL-MCNC: 160 MG/DL
POTASSIUM SERPL-SCNC: 4.9 MMOL/L (ref 3.4–5.3)
SODIUM SERPL-SCNC: 140 MMOL/L (ref 133–144)
TRIGL SERPL-MCNC: 88 MG/DL

## 2018-09-04 PROCEDURE — 86803 HEPATITIS C AB TEST: CPT | Performed by: INTERNAL MEDICINE

## 2018-09-04 PROCEDURE — 36415 COLL VENOUS BLD VENIPUNCTURE: CPT | Performed by: INTERNAL MEDICINE

## 2018-09-04 PROCEDURE — 80061 LIPID PANEL: CPT | Performed by: INTERNAL MEDICINE

## 2018-09-04 PROCEDURE — 80048 BASIC METABOLIC PNL TOTAL CA: CPT | Performed by: INTERNAL MEDICINE

## 2018-09-05 LAB — HCV AB SERPL QL IA: NONREACTIVE

## 2018-09-12 ENCOUNTER — TRANSFERRED RECORDS (OUTPATIENT)
Dept: HEALTH INFORMATION MANAGEMENT | Facility: CLINIC | Age: 73
End: 2018-09-12

## 2018-10-10 ENCOUNTER — ALLIED HEALTH/NURSE VISIT (OUTPATIENT)
Dept: FAMILY MEDICINE | Facility: CLINIC | Age: 73
End: 2018-10-10
Payer: COMMERCIAL

## 2018-10-10 DIAGNOSIS — Z23 NEED FOR PROPHYLACTIC VACCINATION AND INOCULATION AGAINST INFLUENZA: Primary | ICD-10-CM

## 2018-10-10 PROCEDURE — 90662 IIV NO PRSV INCREASED AG IM: CPT

## 2018-10-10 PROCEDURE — G0008 ADMIN INFLUENZA VIRUS VAC: HCPCS

## 2018-10-10 NOTE — PROGRESS NOTES

## 2018-10-10 NOTE — MR AVS SNAPSHOT
After Visit Summary   10/10/2018    Patty Zambrano    MRN: [id number]           Patient Information     Date Of Birth          1945        Visit Information        Provider Department      10/10/2018 11:00 AM [facility]OT [facility]        Today's Diagnoses     Need for prophylactic vaccination and inoculation against influenza    -  1       Follow-ups after your visit        Who to contact     If you have questions or need follow up information about today's clinic visit or your schedule please contact [facility] directly at [phone].  Normal or non-critical lab and imaging results will be communicated to you by Social Pointhart, letter or phone within 4 business days after the clinic has received the results. If you do not hear from us within 7 days, please contact the clinic through Welcome Real-timet or phone. If you have a critical or abnormal lab result, we will notify you by phone as soon as possible.  Submit refill requests through Vigix or call your pharmacy and they will forward the refill request to us. Please allow 3 business days for your refill to be completed.          Additional Information About Your Visit        MyChart Information     Vigix gives you secure access to your electronic health record. If you see a primary care provider, you can also send messages to your care team and make appointments. If you have questions, please call your primary care clinic.  If you do not have a primary care provider, please call [phone] and they will assist you.        Care EveryWhere ID     This is your Care EveryWhere ID. This could be used by other organizations to access your [place] medical records  [id number]         Blood Pressure from Last 3 Encounters:   08/29/18 126/72   12/04/17 132/77   07/12/17 127/85    Weight from Last 3 Encounters:   08/29/18 150 lb (68 kg)   12/04/17 153 lb (69.4 kg)   07/12/17 154 lb (69.9 kg)              We Performed the  Following     FLU VACCINE, INCREASED ANTIGEN, PRESV FREE, AGE 65+ [55507]     Vaccine Administration, Initial [[id number]]        Primary Care Provider Office Phone # Fax #    Jenny Guzman [phone] [address] [facility] [address]        Equal Access to Services     ERIN ROMERO : Hadii aad ku hadasho Soomaali, waaxda luqadaha, qaybta kaalmada adeegyada, catherine nicein haymartinn adekaren estebanty powers . So [facility] [phone].    ATENCIÓN: Si habla español, tiene a haas disposición servicios gratuitos de asistencia lingüística. Devon al [phone].    We comply with applicable federal civil rights laws and [place] laws. We do not discriminate on the basis of race, color, national origin, age, disability, sex, sexual orientation, or gender identity.            Thank you!     Thank you for choosing [facility]  for your care. Our goal is always to provide you with excellent care. Hearing back from our patients is one way we can continue to improve our services. Please take a few minutes to complete the written survey that you may receive in the mail after your visit with us. Thank you!             Your Updated Medication List - Protect others around you: Learn how to safely use, store and throw away your medicines at www.disposemymeds.org.          This list is accurate as of 10/10/18 11:36 AM.  Always use your most recent med list.                   Brand Name Dispense Instructions for use Diagnosis    aspirin 81 MG tablet      1 TABLET DAILY        calcium + D 600-200 MG-UNIT Tabs   Generic drug:  calcium carbonate-vitamin D      2 TABLET DAILY        cycloSPORINE 0.05 % ophthalmic emulsion    RESTASIS     Place 1 drop into both eyes 2 times daily        FISH OIL      1,500 mg. Daily        glucosamine chondroitin complex 500-400 MG Tabs   Generic drug:  glucosamine-chondroitinoitin      3 daily        MELATONIN PO      Take 3 mg by mouth        METAMUCIL PO      once daily         minoxidil 2 % external solution    ROGAINE     Apply topically 2 times daily 2%        MULTIVITAMIN PO      None Entered        PROBIOTIC ACIDOPHILUS BIOBEADS Caps           vitamin D 2000 units tablet     100 tablet    Take 2,000 Units by mouth daily

## 2019-01-23 ENCOUNTER — OFFICE VISIT (OUTPATIENT)
Dept: FAMILY MEDICINE | Facility: CLINIC | Age: 74
End: 2019-01-23
Payer: COMMERCIAL

## 2019-01-23 VITALS
SYSTOLIC BLOOD PRESSURE: 100 MMHG | HEART RATE: 98 BPM | TEMPERATURE: 97.3 F | WEIGHT: 157 LBS | HEIGHT: 67 IN | BODY MASS INDEX: 24.64 KG/M2 | DIASTOLIC BLOOD PRESSURE: 60 MMHG | OXYGEN SATURATION: 99 %

## 2019-01-23 DIAGNOSIS — B02.9 HERPES ZOSTER WITHOUT COMPLICATION: Primary | ICD-10-CM

## 2019-01-23 PROCEDURE — 99213 OFFICE O/P EST LOW 20 MIN: CPT | Performed by: NURSE PRACTITIONER

## 2019-01-23 RX ORDER — VALACYCLOVIR HYDROCHLORIDE 1 G/1
1000 TABLET, FILM COATED ORAL 3 TIMES DAILY
Qty: 21 TABLET | Refills: 0 | Status: SHIPPED | OUTPATIENT
Start: 2019-01-23 | End: 2019-02-05

## 2019-01-23 ASSESSMENT — MIFFLIN-ST. JEOR: SCORE: 1259.78

## 2019-01-23 NOTE — PROGRESS NOTES
"  SUBJECTIVE:   Patty Zambrano is a 73 year old female who presents to clinic today for the following health issues:      Possible shingles on forehead      Duration: 3-4 days ago    Description (location/character/radiation): patient c/o pain, tingling , headache started 3-4 days ago; rash starting on forehead above left eye started this morning.    Did have shingles vaccine 2009    Intensity:  moderate    Accompanying signs and symptoms: none    History (similar episodes/previous evaluation): None    Precipitating or alleviating factors: None    Therapies tried and outcome: None           Problem list and histories reviewed & adjusted, as indicated.  Additional history: as documented    Reviewed and updated as needed this visit by clinical staff  Tobacco  Allergies  Meds  Med Hx  Surg Hx  Fam Hx  Soc Hx      Reviewed and updated as needed this visit by Provider         ROS:  Constitutional, HEENT, cardiovascular, pulmonary, gi and gu systems are negative, except as otherwise noted.    OBJECTIVE:     /60 (BP Location: Right arm)   Pulse 98   Temp 97.3  F (36.3  C) (Tympanic)   Ht 1.702 m (5' 7\")   Wt 71.2 kg (157 lb)   SpO2 99%   BMI 24.59 kg/m    Body mass index is 24.59 kg/m .  GENERAL: healthy, alert and no distress  SKIN: Vesicular rash with erythematous base above left eye, above left temple and left hairline - few lesions extending into scalp. Following dermatomal distribution.      ASSESSMENT/PLAN:       ICD-10-CM    1. Herpes zoster without complication B02.9 valACYclovir (VALTREX) 1000 mg tablet       Patient Instructions   Schedule an eye exam for the next 1-2 days.    Complete antiviral therapy.        The risks, benefits and treatment options of prescribed medications or other treatments have been discussed with the patient. The patient verbalized their understanding and should call or follow up if no improvement or if they develop further problems.    María Spencer, APRN " [facility]

## 2019-01-23 NOTE — PATIENT INSTRUCTIONS
Schedule an eye exam for the next 1-2 days.    Complete antiviral therapy.            Thank you for choosing [facility].  You may be receiving a survey in the mail from Shawn Landon regarding your visit today.  Please take a few minutes to complete and return the survey to let us know how we are doing.      If you have questions or concerns, please contact us via [email or web address] (formerly [employer]) or you can contact your care team at [phone].    Our Clinic hours are:  Monday 6:40 am  to 7:00 pm  Tuesday -Friday 6:40 am to 5:00 pm    The [place] outpatient lab hours are:  Monday - Friday 6:10 am to 4:45 pm  Saturdays 7:00 am to 11:00 am  Appointments are required, call [phone]    If you have clinical questions after hours or would like to schedule an appointment,  call the clinic at [phone].

## 2019-02-05 ENCOUNTER — HOSPITAL ENCOUNTER (EMERGENCY)
Facility: CLINIC | Age: 74
Discharge: HOME | End: 2019-02-05
Attending: FAMILY MEDICINE | Admitting: FAMILY MEDICINE
Payer: COMMERCIAL

## 2019-02-05 VITALS
TEMPERATURE: 98 F | DIASTOLIC BLOOD PRESSURE: 90 MMHG | OXYGEN SATURATION: 100 % | SYSTOLIC BLOOD PRESSURE: 161 MMHG | WEIGHT: 155 LBS | HEART RATE: 96 BPM | RESPIRATION RATE: 16 BRPM | BODY MASS INDEX: 24.28 KG/M2

## 2019-02-05 DIAGNOSIS — H65.92 OME (OTITIS MEDIA WITH EFFUSION), LEFT: ICD-10-CM

## 2019-02-05 PROCEDURE — 99282 EMERGENCY DEPT VISIT SF MDM: CPT | Mod: Z6 | Performed by: FAMILY MEDICINE

## 2019-02-05 PROCEDURE — 99282 EMERGENCY DEPT VISIT SF MDM: CPT | Performed by: FAMILY MEDICINE

## 2019-02-05 NOTE — ED AVS SNAPSHOT
[facility] Emergency Department  [address] [address]  Phone:  [phone]  Fax:  [phone]                                    Patty Zambrano   MRN: [id number]    Department:  [facility] Emergency Department   Date of Visit:  2/5/2019           After Visit Summary Signature Page    I have received my discharge instructions, and my questions have been answered. I have discussed any challenges I see with this plan with the nurse or doctor.    ..........................................................................................................................................  Patient/Patient Representative Signature      ..........................................................................................................................................  Patient Representative Print Name and Relationship to Patient    ..................................................               ................................................  Date                                   Time    ..........................................................................................................................................  Reviewed by Signature/Title    ...................................................              ..............................................  Date                                               Time          22EPIC Rev 08/18

## 2019-02-05 NOTE — DISCHARGE INSTRUCTIONS
Return to the Emergency Room if the following occurs:     New ear pain, drainage from the ear, fever >101, or for any concern at anytime.    Or, follow-up with the following provider as we discussed:     Consider follow up with ENT for further discussion or if not improved over the next 2 weeks.    Medications discussed:    None new.  No changes.    If you received pain-relieving or sedating medication during your time in the ER, avoid alcohol, driving automobiles, or working with machinery.  Also, a responsible adult must stay with you.        Call the Nurse Advice Line at [phone] or [phone] for any concern at anytime.

## 2019-02-05 NOTE — ED PROVIDER NOTES
HPI  Current medications, past medical history, and social history are reviewed.    The patient is a 73-year-old female presenting with concern for hearing loss.  She was diagnosed with a shingles infection within the past 2 weeks.  She took her antiviral medication over the course of 1 week.  She has had URI symptoms over the past 4-5 days.  Starting this morning, she recognized some hearing loss in the left side.  This has worsened slightly over the course of the day.  She tells me there is not complete loss of hearing but that it is extremely dull on the left.  She has no tinnitus today as well.  No ear pain is described.  The rash from her shingles has improved dramatically over the course of the past 2 weeks though she does continue to have shooting pain involving her left forehead and face.  No fever.  No headache or neck pain.  No trauma or injury.  No drainage from the ear.  No facial pain or pressure and no green nasal discharge.  No dental pain.    ROS: All other review of systems are negative other than that noted above.     Past Medical History:   Diagnosis Date     Abnormal Papanicolaou smear of cervix and cervical HPV 1978    cryotherapy     Brachial neuritis or radiculitis NOS     thoracic outlet syndrome, left sided     Diverticulitis of colon (without mention of hemorrhage)(562.11) 05/04    presumptive     Postmenopausal atrophic vaginitis      Raynaud's syndrome      Past Surgical History:   Procedure Laterality Date     COLONOSCOPY  4/3/2014    Procedure: COLONOSCOPY;  Colonoscopy;  Surgeon: Richie Kwon MD;  Location: [facility]     COLONOSCOPY N/A 12/15/2016    Procedure: COLONOSCOPY;  Surgeon: Richie Kwon MD;  Location: [facility]     EYE SURGERY  03/2017    laser     HC COLONOSCOPY THRU STOMA, DIAGNOSTIC  11/03    due 10 years     HC DILATION/CURETTAGE DIAG/THER NON OB       SURGICAL HISTORY OF -   05/05    right knee arthroscopy     Medicines    ASPIRIN 81 MG OR TABS   CALCIUM + D 600-200 MG-UNIT  OR TABS   Cholecalciferol (VITAMIN D) 2000 UNITS tablet   cycloSPORINE (RESTASIS) 0.05 % ophthalmic emulsion   FISH OIL   GLUCOSAMINE CHONDRO COMPLEX 500-400 MG OR TABS   MELATONIN PO   METAMUCIL OR   minoxidil (ROGAINE) 5 % external solution   MULTIVITAMIN OR   Probiotic Product (PROBIOTIC ACIDOPHILUS) CAPS   valACYclovir (VALTREX) 1000 mg tablet     Family History   Problem Relation Age of Onset     Hypertension Mother      Osteoporosis Mother      Cancer Maternal Grandmother         abdominal CA     Heart Disease Maternal Grandfather      Alcohol/Drug Paternal Grandfather      Cancer Brother         sarcoma     Arthritis Father      Alzheimer Disease Father      Social History     Tobacco Use     Smoking status: Never Smoker     Smokeless tobacco: Never Used   Substance Use Topics     Alcohol use: Yes     Comment: rare     Drug use: No         PHYSICAL  /90   Pulse 96   Temp 98  F (36.7  C) (Temporal)   Resp 16   Wt 70.3 kg (155 lb)   SpO2 100%   BMI 24.28 kg/m    General: Patient is alert and in no distress.  Neurological: Alert.  Moving upper and lower extremities equally, bilaterally.  Head / Neck: Atraumatic.  Ears: Left tympanic membrane is dull and full.  No injection on its surface.  No evidence for purulence behind.  The TM on the right is clear very normal-appearing.  Eyes: Pupils are equal, round, and reactive.  Normal conjunctiva.  Nose: Midline.  No epistaxis.  Mouth / Throat: No ulcerations or lesions.  Upper pharynx is not erythematous.  Moist.  Respiratory: No respiratory distress.  Cardiovascular: Regular rhythm.  Peripheral extremities are warm.  Abdomen / Pelvis: Not tender.  No distention.  Soft throughout.  Genitalia: Not done.  Musculoskeletal: No tenderness over major muscles and joints.  Skin: No evidence of rash or trauma.        ED COURSE  Rhinne testing on the L is negative, suggesting a conductive hearing loss.  This coincides with her history and physical exam finding.  She  does not have evidence for a sensorineural source of hearing loss.  If not improved over the next 2 weeks, consider follow-up with ENT.  If there is ear pain or headache or neck pain that is new and severe please return to the ED for further evaluation.    Labs Ordered and Resulted from Time of ED Arrival Up to the Time of Departure from the ED - No data to display    Medications - No data to display      IMPRESSION    ICD-10-CM    1. OME (otitis media with effusion), left H65.92          Critical Care time:  none                    Regino Rivas MD  02/05/19 1263

## 2019-02-06 ENCOUNTER — OFFICE VISIT (OUTPATIENT)
Dept: FAMILY MEDICINE | Facility: CLINIC | Age: 74
End: 2019-02-06
Payer: COMMERCIAL

## 2019-02-06 VITALS
WEIGHT: 157 LBS | DIASTOLIC BLOOD PRESSURE: 74 MMHG | TEMPERATURE: 97.2 F | HEART RATE: 80 BPM | BODY MASS INDEX: 24.59 KG/M2 | SYSTOLIC BLOOD PRESSURE: 126 MMHG | RESPIRATION RATE: 12 BRPM | OXYGEN SATURATION: 100 %

## 2019-02-06 DIAGNOSIS — B02.21 RAMSAY HUNT SYNDROME (GENICULATE HERPES ZOSTER): Primary | ICD-10-CM

## 2019-02-06 PROCEDURE — 99214 OFFICE O/P EST MOD 30 MIN: CPT | Performed by: INTERNAL MEDICINE

## 2019-02-06 RX ORDER — MECLIZINE HYDROCHLORIDE 25 MG/1
25 TABLET ORAL 3 TIMES DAILY PRN
Qty: 30 TABLET | Refills: 1 | Status: SHIPPED | OUTPATIENT
Start: 2019-02-06 | End: 2019-02-16

## 2019-02-06 RX ORDER — PREDNISONE 20 MG/1
60 TABLET ORAL DAILY
Qty: 21 TABLET | Refills: 0 | Status: SHIPPED | OUTPATIENT
Start: 2019-02-06 | End: 2019-02-13

## 2019-02-06 RX ORDER — ONDANSETRON 4 MG/1
4 TABLET, FILM COATED ORAL EVERY 8 HOURS PRN
Qty: 20 TABLET | Refills: 1 | Status: SHIPPED | OUTPATIENT
Start: 2019-02-06 | End: 2019-02-13

## 2019-02-06 NOTE — PATIENT INSTRUCTIONS
1. Start prednisone x 1 week  2. Start meclizine as needed for dizziness.  3. Use zofran as needed for nausea  4. See ENT w/in 1-2 weeks

## 2019-02-06 NOTE — PROGRESS NOTES
SUBJECTIVE:   Patty Zambrano is a 73 year old female who presents to clinic today for the following health issues:      ED/UC Followup:    Facility:  [facility]  Date of visit: 2/5/2019  Reason for visit: OME (otitis media with effusion), left  Current Status: Still having problem         Concern - Follow up E.R. Above - OME (otitis media with effusion), left  Onset: Yesterday    Description:   PT tried to get an appointment with the ENT and was unable to get in in the next 3 weeks. Still having pain in the left ear. Had shingles couple weeks ago with a cold and its concerns about the ear. Would like to have the Dr. Guzman checkout again. Pt also verbalize about having dizziness     Intensity: moderate    Progression of Symptoms:  same and constant    Accompanying Signs & Symptoms:  See above    Previous history of similar problem:   yes    Precipitating factors:   Worsened by: dizziness    Alleviating factors:Improved by: na    Therapies Tried and outcome: na    Dizziness  Duration of complaint: Today   Description:   Feeling faint:  no   Feeling like the surroundings are moving: YES  Worse with activity/head movement: YES  Loss of consciousness: no   Falls: no   Nausea/vomitting: YES- nausea  Vision or speech changes: no   Ringing in ears (Tinnitus): YES  Hearing loss related to dizziness: YES- left ear  New medications which may be causing symptoms: no   Therapies tried and outcome: None  --dizziness is somewhat better now, but severe when she woke up this AM.  --felt like room was spinning.      --today she reports symptoms are worse - having dizziness and nausea.  --she was not given antibiotic for the infection  --she is still having pain in her face from shingles - left side of face.  --no pain in the ear.  --has significant ringing in the ear, decreased hearing  --she finished anti-viral medication.    Current Outpatient Medications   Medication Sig Dispense Refill     ASPIRIN 81 MG OR TABS 1  TABLET DAILY       CALCIUM + D 600-200 MG-UNIT OR TABS 2 TABLET DAILY       Cholecalciferol (VITAMIN D) 2000 UNITS tablet Take 2,000 Units by mouth daily 100 tablet 3     cycloSPORINE (RESTASIS) 0.05 % ophthalmic emulsion Place 1 drop into both eyes 2 times daily       FISH OIL 1,500 mg. Daily       GLUCOSAMINE CHONDRO COMPLEX 500-400 MG OR TABS 3 tablets by mouth daily       MELATONIN PO Take 3 mg by mouth At Bedtime        METAMUCIL OR Take 1 teaspoonful once daily       minoxidil (ROGAINE) 5 % external solution Apply topically 2 times daily 2%       MULTIVITAMIN OR Take 1 tablet by mouth daily       Probiotic Product (PROBIOTIC ACIDOPHILUS) CAPS Take 1 capsule by mouth daily          Reviewed and updated as needed this visit by clinical staff  Tobacco  Allergies  Meds  Med Hx  Surg Hx  Fam Hx  Soc Hx      Reviewed and updated as needed this visit by Provider         ROS:  Constitutional, HEENT, cardiovascular, pulmonary, gi and gu systems are negative, except as otherwise noted.    OBJECTIVE:     /74 (BP Location: Right arm, Patient Position: Sitting, Cuff Size: Adult Regular)   Pulse 80   Temp 97.2  F (36.2  C) (Tympanic)   Resp 12   Wt 71.2 kg (157 lb)   SpO2 100%   Breastfeeding? No   BMI 24.59 kg/m    Body mass index is 24.59 kg/m .  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   We will start high-dose prednisone and symptomatic treatment of her vertigo.  Advised her to see ear nose and throat within 1-2 weeks.      Jenny Guzman,   [facility]

## 2019-02-13 ENCOUNTER — OFFICE VISIT (OUTPATIENT)
Dept: OTOLARYNGOLOGY | Facility: CLINIC | Age: 74
End: 2019-02-13
Payer: COMMERCIAL

## 2019-02-13 ENCOUNTER — OFFICE VISIT (OUTPATIENT)
Dept: AUDIOLOGY | Facility: CLINIC | Age: 74
End: 2019-02-13
Payer: COMMERCIAL

## 2019-02-13 VITALS
BODY MASS INDEX: 24.64 KG/M2 | OXYGEN SATURATION: 99 % | DIASTOLIC BLOOD PRESSURE: 80 MMHG | SYSTOLIC BLOOD PRESSURE: 125 MMHG | WEIGHT: 157 LBS | HEIGHT: 67 IN | RESPIRATION RATE: 12 BRPM | HEART RATE: 81 BPM

## 2019-02-13 DIAGNOSIS — H90.42 SENSORINEURAL HEARING LOSS, UNILATERAL, LEFT EAR, WITH UNRESTRICTED HEARING ON THE CONTRALATERAL SIDE: Primary | ICD-10-CM

## 2019-02-13 DIAGNOSIS — H90.3 SNHL (SENSORY-NEURAL HEARING LOSS), ASYMMETRICAL: Primary | ICD-10-CM

## 2019-02-13 PROCEDURE — 99207 ZZC NO CHARGE LOS: CPT | Performed by: AUDIOLOGIST

## 2019-02-13 PROCEDURE — 92550 TYMPANOMETRY & REFLEX THRESH: CPT | Performed by: AUDIOLOGIST

## 2019-02-13 PROCEDURE — 99204 OFFICE O/P NEW MOD 45 MIN: CPT | Performed by: OTOLARYNGOLOGY

## 2019-02-13 PROCEDURE — 92557 COMPREHENSIVE HEARING TEST: CPT | Performed by: AUDIOLOGIST

## 2019-02-13 RX ORDER — PREDNISONE 20 MG/1
TABLET ORAL
Qty: 10 TABLET | Refills: 0 | Status: SHIPPED | OUTPATIENT
Start: 2019-02-13 | End: 2019-02-20

## 2019-02-13 ASSESSMENT — MIFFLIN-ST. JEOR: SCORE: 1259.78

## 2019-02-13 NOTE — PROGRESS NOTES
AUDIOLOGY REPORT:    Patient was referred to Audiology from ENT by Tunde Breen MD for a hearing examination. She reports a sudden hearing loss about 1 week ago in her left ear.    Testing:    Otoscopy:   Otoscopic exam indicates ears are clear of cerumen bilaterally     Tympanograms:    RIGHT: negative pressure      LEFT:   normal eardrum mobility    Reflexes (reported by stimulus ear):  RIGHT: Ipsilateral is present at normal levels  RIGHT: Contralateral is present at normal levels  LEFT:   Ipsilateral is present at 500 Hz, but absent at 8235-0454 Hz  LEFT:   Contralateral is present at normal levels    Thresholds:   Pure Tone Thresholds assessed using conventional audiometry with good  reliability from 250-8000 Hz bilaterally using insert earphones     RIGHT:  normal hearing sensitivity at all frequencies with the exception of a mild sensorineural dip at 3000 Hz      LEFT:  Normal from 250-750 Hz with, sloping to a  severe and profound sensorineural hearing loss at 1000 Hz and higher    Speech Reception Threshold:    RIGHT: 15 dB HL    LEFT:   10 dB HL (SDT)    Word Recognition Score:     RIGHT: 100% at 55 dB HL using NU-6 recorded word list.    LEFT:   40% at 75 dB HL using NU-6 recorded word list.    Discussed results with the patient. We discussed the possible future need for a hearing aid and I recommend a hearing aid consultation, should she be medically cleared and if hearing in the left ear is not be able to be restored through medical treatment.      Patient was returned to ENT for follow up.     Igor Toney MA, CCC-A  [place] Licensed Audiologist #[id number]  2/13/2019

## 2019-02-13 NOTE — PROGRESS NOTES
I am seeing this patient in consultation for Sena-Hunt Syndrome at the request of the provider Dr. Jenny Guzman.    Chief Complaint - hearing loss    History of Present Illness - Patty Zambrano is a 73 year old female who presents to me today with hearing loss in the left ear and tinnitus.  It has been present and noticeable for approximately 1 week. She had shingles of the left forehead and temple, and on nose diagnosed about 3 weeks ago. She took antivirals (valtrex). She later developed the hearing loss and was given prednisone 60 mg daily for 1 week. She has some ear and face numbness on the left too. She also has had dizziness for about 2 days, but that got better. No facial nerve weakness. The hearing loss maybe got slightly better. Never had shingles before. No taste issues. The tongue feels a little tingling too, but no pain.     Past Medical History -   Patient Active Problem List   Diagnosis     Postmenopausal atrophic vaginitis     Sinusitis, chronic     Osteopenia     Hypovitaminosis D     HYPERLIPIDEMIA LDL GOAL <160     HCD (health care directive)     Advanced directives, counseling/discussion     Cataract     Sigmoid diverticulitis     Dysthymia       Current Medications -   Current Outpatient Medications:      ASPIRIN 81 MG OR TABS, 1 TABLET DAILY, Disp: , Rfl:      CALCIUM + D 600-200 MG-UNIT OR TABS, 2 TABLET DAILY, Disp: , Rfl:      Cholecalciferol (VITAMIN D) 2000 UNITS tablet, Take 2,000 Units by mouth daily, Disp: 100 tablet, Rfl: 3     cycloSPORINE (RESTASIS) 0.05 % ophthalmic emulsion, Place 1 drop into both eyes 2 times daily, Disp: , Rfl:      FISH OIL, 1,500 mg. Daily, Disp: , Rfl:      GLUCOSAMINE CHONDRO COMPLEX 500-400 MG OR TABS, 3 tablets by mouth daily, Disp: , Rfl:      meclizine (ANTIVERT) 25 MG tablet, Take 1 tablet (25 mg) by mouth 3 times daily as needed for dizziness, Disp: 30 tablet, Rfl: 1     MELATONIN PO, Take 3 mg by mouth At Bedtime , Disp: , Rfl:      METAMUCIL OR,  Take 1 teaspoonful once daily, Disp: , Rfl:      minoxidil (ROGAINE) 5 % external solution, Apply topically 2 times daily 2%, Disp: , Rfl:      MULTIVITAMIN OR, Take 1 tablet by mouth daily, Disp: , Rfl:      ondansetron (ZOFRAN) 4 MG tablet, Take 1 tablet (4 mg) by mouth every 8 hours as needed for nausea, Disp: 20 tablet, Rfl: 1     predniSONE (DELTASONE) 20 MG tablet, Take 60 mg by mouth daily for 7 days., Disp: 21 tablet, Rfl: 0     Probiotic Product (PROBIOTIC ACIDOPHILUS) CAPS, Take 1 capsule by mouth daily , Disp: , Rfl:     Allergies -   Allergies   Allergen Reactions     Augmentin Diarrhea       Social History -   Social History     Socioeconomic History     Marital status:      Spouse name: Not on file     Number of children: Not on file     Years of education: Not on file     Highest education level: Not on file   Social Needs     Financial resource strain: Not on file     Food insecurity - worry: Not on file     Food insecurity - inability: Not on file     Transportation needs - medical: Not on file     Transportation needs - non-medical: Not on file   Occupational History     Not on file   Tobacco Use     Smoking status: Never Smoker     Smokeless tobacco: Never Used   Substance and Sexual Activity     Alcohol use: Yes     Comment: rare     Drug use: No     Sexual activity: Not Currently     Partners: Male   Other Topics Concern     Parent/sibling w/ CABG, MI or angioplasty before 65F 55M? Not Asked   Social History Narrative     Not on file       Family History -   Family History   Problem Relation Age of Onset     Hypertension Mother      Osteoporosis Mother      Cancer Maternal Grandmother         abdominal CA     Heart Disease Maternal Grandfather      Alcohol/Drug Paternal Grandfather      Cancer Brother         sarcoma     Arthritis Father      Alzheimer Disease Father      Review of Systems - As per HPI and PMHx, otherwise 10+ comprehensive system review is negative.    Physical Exam  BP  "125/80   Pulse 81   Resp 12   Ht 1.702 m (5' 7\")   Wt 71.2 kg (157 lb)   SpO2 99%   BMI 24.59 kg/m    General - The patient is in no distress.  Alert and oriented to person and place, answers questions and cooperates with examination appropriately.   Voice and Breathing - The patient was breathing comfortably without the use of accessory muscles. There was no wheezing, stridor, or stertor.  The patients voice was clear and strong.  Ears - The auricles are normal. The tympanic membranes are normal in appearance, bony landmarks are intact.  No retraction, perforation, or masses.  No fluid or purulence was seen in the external canal or the middle ear. No evidence of infection of the middle ear or external canal, cerumen was normal in appearance.  Eyes - Extraocular movements intact. PERRL. Sclera were not icteric or injected.  Mouth - Examination of the oral cavity showed pink, healthy mucosa. No lesions or ulcerations noted.  The tongue was mobile and midline.  Throat - The walls of the oropharynx were smooth, symmetric, and had no lesions or ulcerations. The uvula was midline on elevation.    Neck - Palpation of the occipital, submental, submandibular, internal jugular chain, and supraclavicular nodes did not demonstrate any abnormal lymph nodes or masses. Parotid glands had no masses. Palpation of the thyroid was soft and smooth, with no nodules or goiter appreciated.  The trachea was mobile and midline.  Neurological - Cranial nerves 2 through 12 were grossly intact. House-Brackmann grade 1 out of 6 bilaterally.   Cardiovascular - carotid pulses are 2+ bilaterally, regular rhythm        Audiologic Studies - An audiogram and tympanogram were performed today as part of the evaluation and personally reviewed. The tympanogram shows a normal Type A curve, with normal canal volume and middle ear pressure.  Some negative pressure on the right.  The right ear hearing was normal.  However the left hearing had moderate " mid downsloping to profound high frequency sensorineural hearing loss.      Assessment and Plan - Patty Zambrano is a 73 year old female who presents to me today with likely herpes zoster involving V1 and V2 on the left.  Interestingly she developed sudden onset left-sided sensorineural hearing loss.  No facial nerve weakness.  Therefore I do not think this is a true Carrington Paula syndrome.  Because of the distribution of cranial nerves V and VIII being involved I recommend an MRI to rule out any skull base or intracranial pathology that could be causing this.  She has been adequately treated with Valtrex as well as a prednisone burst and I will have her taper that off.  A prednisone taper was sent to her pharmacy.  I want her to return in 1 week with a repeat audiogram.  If she has not had improvement we can consider a transtympanic dexamethasone injection.  We briefly described this.    Tunde Breen MD  Otolaryngology  [facility]

## 2019-02-13 NOTE — LETTER
2/13/2019         RE: Patty Zambrano  [address] [facility] [id number]        Dear Colleague,    Thank you for referring your patient, Patty Zambrano, to the [facility]. Please see a copy of my visit note below.    I am seeing this patient in consultation for [place]-Hunt Syndrome at the request of the provider Dr. Jenny Guzman.    Chief Complaint - hearing loss    History of Present Illness - Patty Zambrano is a 73 year old female who presents to me today with hearing loss in the left ear and tinnitus.  It has been present and noticeable for approximately 1 week. She had shingles of the left forehead and temple, and on nose diagnosed about 3 weeks ago. She took antivirals (valtrex). She later developed the hearing loss and was given prednisone 60 mg daily for 1 week. She has some ear and face numbness on the left too. She also has had dizziness for about 2 days, but that got better. No facial nerve weakness. The hearing loss maybe got slightly better. Never had shingles before. No taste issues. The tongue feels a little tingling too, but no pain.     Past Medical History -   Patient Active Problem List   Diagnosis     Postmenopausal atrophic vaginitis     Sinusitis, chronic     Osteopenia     Hypovitaminosis D     HYPERLIPIDEMIA LDL GOAL <160     HCD (health care directive)     Advanced directives, counseling/discussion     Cataract     Sigmoid diverticulitis     Dysthymia       Current Medications -   Current Outpatient Medications:      ASPIRIN 81 MG OR TABS, 1 TABLET DAILY, Disp: , Rfl:      CALCIUM + D 600-200 MG-UNIT OR TABS, 2 TABLET DAILY, Disp: , Rfl:      Cholecalciferol (VITAMIN D) 2000 UNITS tablet, Take 2,000 Units by mouth daily, Disp: 100 tablet, Rfl: 3     cycloSPORINE (RESTASIS) 0.05 % ophthalmic emulsion, Place 1 drop into both eyes 2 times daily, Disp: , Rfl:      FISH OIL, 1,500 mg. Daily, Disp: , Rfl:      GLUCOSAMINE CHONDRO COMPLEX 500-400 MG OR TABS, 3 tablets by mouth  daily, Disp: , Rfl:      meclizine (ANTIVERT) 25 MG tablet, Take 1 tablet (25 mg) by mouth 3 times daily as needed for dizziness, Disp: 30 tablet, Rfl: 1     MELATONIN PO, Take 3 mg by mouth At Bedtime , Disp: , Rfl:      METAMUCIL OR, Take 1 teaspoonful once daily, Disp: , Rfl:      minoxidil (ROGAINE) 5 % external solution, Apply topically 2 times daily 2%, Disp: , Rfl:      MULTIVITAMIN OR, Take 1 tablet by mouth daily, Disp: , Rfl:      ondansetron (ZOFRAN) 4 MG tablet, Take 1 tablet (4 mg) by mouth every 8 hours as needed for nausea, Disp: 20 tablet, Rfl: 1     predniSONE (DELTASONE) 20 MG tablet, Take 60 mg by mouth daily for 7 days., Disp: 21 tablet, Rfl: 0     Probiotic Product (PROBIOTIC ACIDOPHILUS) CAPS, Take 1 capsule by mouth daily , Disp: , Rfl:     Allergies -   Allergies   Allergen Reactions     Augmentin Diarrhea       Social History -   Social History     Socioeconomic History     Marital status:      Spouse name: Not on file     Number of children: Not on file     Years of education: Not on file     Highest education level: Not on file   Social Needs     Financial resource strain: Not on file     Food insecurity - worry: Not on file     Food insecurity - inability: Not on file     Transportation needs - medical: Not on file     Transportation needs - non-medical: Not on file   Occupational History     Not on file   Tobacco Use     Smoking status: Never Smoker     Smokeless tobacco: Never Used   Substance and Sexual Activity     Alcohol use: Yes     Comment: rare     Drug use: No     Sexual activity: Not Currently     Partners: Male   Other Topics Concern     Parent/sibling w/ CABG, MI or angioplasty before 65F 55M? Not Asked   Social History Narrative     Not on file       Family History -   Family History   Problem Relation Age of Onset     Hypertension Mother      Osteoporosis Mother      Cancer Maternal Grandmother         abdominal CA     Heart Disease Maternal Grandfather       "Alcohol/Drug Paternal Grandfather      Cancer Brother         sarcoma     Arthritis Father      Alzheimer Disease Father      Review of Systems - As per HPI and PMHx, otherwise 10+ comprehensive system review is negative.    Physical Exam  /80   Pulse 81   Resp 12   Ht 1.702 m (5' 7\")   Wt 71.2 kg (157 lb)   SpO2 99%   BMI 24.59 kg/m     General - The patient is in no distress.  Alert and oriented to person and place, answers questions and cooperates with examination appropriately.   Voice and Breathing - The patient was breathing comfortably without the use of accessory muscles. There was no wheezing, stridor, or stertor.  The patients voice was clear and strong.  Ears - The auricles are normal. The tympanic membranes are normal in appearance, bony landmarks are intact.  No retraction, perforation, or masses.  No fluid or purulence was seen in the external canal or the middle ear. No evidence of infection of the middle ear or external canal, cerumen was normal in appearance.  Eyes - Extraocular movements intact. PERRL. Sclera were not icteric or injected.  Mouth - Examination of the oral cavity showed pink, healthy mucosa. No lesions or ulcerations noted.  The tongue was mobile and midline.  Throat - The walls of the oropharynx were smooth, symmetric, and had no lesions or ulcerations. The uvula was midline on elevation.    Neck - Palpation of the occipital, submental, submandibular, internal jugular chain, and supraclavicular nodes did not demonstrate any abnormal lymph nodes or masses. Parotid glands had no masses. Palpation of the thyroid was soft and smooth, with no nodules or goiter appreciated.  The trachea was mobile and midline.  Neurological - Cranial nerves 2 through 12 were grossly intact. House-Brackmann grade 1 out of 6 bilaterally.   Cardiovascular - carotid pulses are 2+ bilaterally, regular rhythm        Audiologic Studies - An audiogram and tympanogram were performed today as part of " the evaluation and personally reviewed. The tympanogram shows a normal Type A curve, with normal canal volume and middle ear pressure.  Some negative pressure on the right.  The right ear hearing was normal.  However the left hearing had moderate mid downsloping to profound high frequency sensorineural hearing loss.      Assessment and Plan - Patty Zambrano is a 73 year old female who presents to me today with likely herpes zoster involving V1 and V2 on the left.  Interestingly she developed sudden onset left-sided sensorineural hearing loss.  No facial nerve weakness.  Therefore I do not think this is a true [place] syndrome.  Because of the distribution of cranial nerves V and VIII being involved I recommend an MRI to rule out any skull base or intracranial pathology that could be causing this.  She has been adequately treated with Valtrex as well as a prednisone burst and I will have her taper that off.  A prednisone taper was sent to her pharmacy.  I want her to return in 1 week with a repeat audiogram.  If she has not had improvement we can consider a transtympanic dexamethasone injection.  We briefly described this.    Tunde Breen MD  Otolaryngology  [facility]        Again, thank you for allowing me to participate in the care of your patient.        Sincerely,        Tunde Breen MD

## 2019-02-13 NOTE — PATIENT INSTRUCTIONS
General Scheduling Information  To schedule your CT/MRI scan, please contact [place] [facility] at [phone]   [address] [place] [address]    To schedule your Surgery, please contact our Specialty Schedulers at [phone]    ENT Clinic Locations Clinic Hours Telephone Number     Leana Smiley  [address] [place] [address]   Tuesday:       8:00am -- 4:00pm    Wednesday:  8:00am - 4:00pm   To schedule an appointment with   Dr. Breen,   please contact our   Specialty Scheduling Department at:     [phone]       Leana Owen  [address] [place] [address]   Friday:          8:00am - 4:00pm         Urgent Care Locations Clinic Hours Telephone Numbers     Leana Rosales  [address] [place] [address]     Monday-Friday:     11:00pm - 9:00pm    Saturday-Sunday:  9:00am - 5:00pm   [phone]     Leana Owen  [address] [place] [address]     Monday-Friday:      5:00pm - 9:00pm     Saturday-Sunday:  9:00am - 5:00pm   [phone]

## 2019-02-13 NOTE — PATIENT INSTRUCTIONS
Please schedule an appointment for a hearing aid evaluation/consultation. You will be asked to schedule a series of 3 appointments: your hearing aid evaluation/consultation, a hearing aid fitting 3 weeks following the first appointment, and then return for an initial review appointment/hearing aid check 3 weeks following the hearing aid fitting. Scheduling this series of appointments does not mean you are required to purchase hearing aid(s).       Hearing aid evaluation/consultation (HAE/HAC): next available     Hearing aid fitting (HFP): 3 weeks after HAE/HAC    Initial review programming/hearing aid check (IRP): 3 weeks after HFP

## 2019-02-18 ENCOUNTER — HOSPITAL ENCOUNTER (OUTPATIENT)
Dept: MRI IMAGING | Facility: CLINIC | Age: 74
Discharge: HOME | End: 2019-02-18
Attending: OTOLARYNGOLOGY | Admitting: OTOLARYNGOLOGY
Payer: COMMERCIAL

## 2019-02-18 DIAGNOSIS — H90.3 SNHL (SENSORY-NEURAL HEARING LOSS), ASYMMETRICAL: ICD-10-CM

## 2019-02-18 LAB
CREAT BLD-MCNC: 1 MG/DL (ref 0.52–1.04)
GFR SERPL CREATININE-BSD FRML MDRD: 55 ML/MIN/{1.73_M2}

## 2019-02-18 PROCEDURE — 25500064 ZZH RX 255 OP 636: Performed by: OTOLARYNGOLOGY

## 2019-02-18 PROCEDURE — 70553 MRI BRAIN STEM W/O & W/DYE: CPT

## 2019-02-18 PROCEDURE — A9585 GADOBUTROL INJECTION: HCPCS | Performed by: OTOLARYNGOLOGY

## 2019-02-18 PROCEDURE — 82565 ASSAY OF CREATININE: CPT

## 2019-02-18 RX ORDER — GADOBUTROL 604.72 MG/ML
7 INJECTION INTRAVENOUS ONCE
Status: COMPLETED | OUTPATIENT
Start: 2019-02-18 | End: 2019-02-18

## 2019-02-18 RX ADMIN — GADOBUTROL 7 ML: 604.72 INJECTION INTRAVENOUS at 08:26

## 2019-02-20 ENCOUNTER — OFFICE VISIT (OUTPATIENT)
Dept: AUDIOLOGY | Facility: CLINIC | Age: 74
End: 2019-02-20
Payer: COMMERCIAL

## 2019-02-20 ENCOUNTER — OFFICE VISIT (OUTPATIENT)
Dept: OTOLARYNGOLOGY | Facility: CLINIC | Age: 74
End: 2019-02-20
Payer: COMMERCIAL

## 2019-02-20 VITALS
WEIGHT: 155 LBS | BODY MASS INDEX: 24.33 KG/M2 | HEART RATE: 74 BPM | SYSTOLIC BLOOD PRESSURE: 131 MMHG | RESPIRATION RATE: 12 BRPM | DIASTOLIC BLOOD PRESSURE: 86 MMHG | HEIGHT: 67 IN | OXYGEN SATURATION: 97 %

## 2019-02-20 DIAGNOSIS — H90.42 SENSORINEURAL HEARING LOSS (SNHL) OF LEFT EAR WITH UNRESTRICTED HEARING OF RIGHT EAR: Primary | ICD-10-CM

## 2019-02-20 DIAGNOSIS — H90.3 SNHL (SENSORY-NEURAL HEARING LOSS), ASYMMETRICAL: Primary | ICD-10-CM

## 2019-02-20 PROCEDURE — 99213 OFFICE O/P EST LOW 20 MIN: CPT | Mod: 25 | Performed by: OTOLARYNGOLOGY

## 2019-02-20 PROCEDURE — 92557 COMPREHENSIVE HEARING TEST: CPT | Performed by: AUDIOLOGIST

## 2019-02-20 PROCEDURE — 69801 INCISE INNER EAR: CPT | Mod: LT | Performed by: OTOLARYNGOLOGY

## 2019-02-20 PROCEDURE — 92567 TYMPANOMETRY: CPT | Performed by: AUDIOLOGIST

## 2019-02-20 ASSESSMENT — MIFFLIN-ST. JEOR: SCORE: 1250.71

## 2019-02-20 NOTE — PROGRESS NOTES
AUDIOLOGY REPORT:    Patient was referred to Audiology from ENT by Tunde Breen for a hearing recheck.  Her last audiogram was one week ago on 2/13/19 at which time, Patient was diagnosed with a sudden, left ear sensorineural hearing loss.    Testing:    Otoscopy:   Otoscopic exam indicates ears are clear of cerumen bilaterally     Tympanograms:    RIGHT: normal eardrum mobility     LEFT:   normal eardrum mobility      Thresholds:   Pure Tone Thresholds assessed using conventional audiometry with good  reliability from 250-8000 Hz bilaterally using insert earphones     RIGHT:  normal hearing sensitivity    LEFT:  Normal hearing from 250-500 Hz, sloping to  severe and profound sensorineural hearing loss at 1000 Hz and higher    Speech Reception Threshold:    RIGHT: 15 dB HL (from last week's test)    LEFT:   10 dB HL (from last week's test)    Word Recognition Score:     RIGHT: 100% at 55 dB HL using NU-6 recorded word list.    LEFT:   78% at 75 dB HL using NU-6 recorded word list.    Compared with audiogram of 2/13/19, there has been no change in hearing thresholds.  Discussed results with the patient.     Patient was returned to ENT for follow up.     Igor Toney MA, CCC-A  [place] Licensed Audiologist #[id number]  2/20/2019

## 2019-02-20 NOTE — LETTER
2/20/2019         RE: Patty Zambrano  [address] [facility] [address]        Dear Colleague,    Thank you for referring your patient, Patty Zambrano, to the [facility]. Please see a copy of my visit note below.      Chief Complaint - hearing loss    History of Present Illness - Patty Zambrano is a 73 year old female who returns to me today with hearing loss in the left ear and tinnitus.  It has been present and noticeable for approximately 2 weeks. She had shingles of the left forehead and temple, and on nose diagnosed about 3-4 weeks ago. She took antivirals (valtrex). She later developed the hearing loss and was given prednisone 60 mg daily for 1 week followed by a taper she just finished. She has some ear and face numbness on the left still. She also has had dizziness for about 2 days, but that got better. No facial nerve weakness. The hearing loss maybe got slightly better. Never had shingles before. No taste issues. The tongue feels a little tingling too, but no pain. No new symptoms to report. MRI was normal.    Past Medical History -   Patient Active Problem List   Diagnosis     Postmenopausal atrophic vaginitis     Sinusitis, chronic     Osteopenia     Hypovitaminosis D     HYPERLIPIDEMIA LDL GOAL <160     HCD (health care directive)     Advanced directives, counseling/discussion     Cataract     Sigmoid diverticulitis     Dysthymia       Current Medications -   Current Outpatient Medications:      ASPIRIN 81 MG OR TABS, 1 TABLET DAILY, Disp: , Rfl:      CALCIUM + D 600-200 MG-UNIT OR TABS, 2 TABLET DAILY, Disp: , Rfl:      Cholecalciferol (VITAMIN D) 2000 UNITS tablet, Take 2,000 Units by mouth daily, Disp: 100 tablet, Rfl: 3     cycloSPORINE (RESTASIS) 0.05 % ophthalmic emulsion, Place 1 drop into both eyes 2 times daily, Disp: , Rfl:      FISH OIL, 1,500 mg. Daily, Disp: , Rfl:      GLUCOSAMINE CHONDRO COMPLEX 500-400 MG OR TABS, 3 tablets by mouth daily, Disp: , Rfl:      MELATONIN  PO, Take 3 mg by mouth At Bedtime , Disp: , Rfl:      METAMUCIL OR, Take 1 teaspoonful once daily, Disp: , Rfl:      minoxidil (ROGAINE) 5 % external solution, Apply topically 2 times daily 2%, Disp: , Rfl:      MULTIVITAMIN OR, Take 1 tablet by mouth daily, Disp: , Rfl:      predniSONE (DELTASONE) 20 MG tablet, Take 60 mg by mouth daily for 1 day, THEN 40 mg daily for 2 days, THEN 20 mg daily for 2 days, THEN 10 mg daily for 2 days., Disp: 10 tablet, Rfl: 0     Probiotic Product (PROBIOTIC ACIDOPHILUS) CAPS, Take 1 capsule by mouth daily , Disp: , Rfl:     Allergies -   Allergies   Allergen Reactions     Augmentin Diarrhea       Social History -   Social History     Socioeconomic History     Marital status:      Spouse name: Not on file     Number of children: Not on file     Years of education: Not on file     Highest education level: Not on file   Social Needs     Financial resource strain: Not on file     Food insecurity - worry: Not on file     Food insecurity - inability: Not on file     Transportation needs - medical: Not on file     Transportation needs - non-medical: Not on file   Occupational History     Not on file   Tobacco Use     Smoking status: Never Smoker     Smokeless tobacco: Never Used   Substance and Sexual Activity     Alcohol use: Yes     Comment: rare     Drug use: No     Sexual activity: Not Currently     Partners: Male   Other Topics Concern     Parent/sibling w/ CABG, MI or angioplasty before 65F 55M? Not Asked   Social History Narrative     Not on file       Family History -   Family History   Problem Relation Age of Onset     Hypertension Mother      Osteoporosis Mother      Cancer Maternal Grandmother         abdominal CA     Heart Disease Maternal Grandfather      Alcohol/Drug Paternal Grandfather      Cancer Brother         sarcoma     Arthritis Father      Alzheimer Disease Father      Review of Systems - As per HPI and PMHx, otherwise 7 system review of the head and neck is  "negative.    Physical Exam  /86   Pulse 74   Resp 12   Ht 1.702 m (5' 7\")   Wt 70.3 kg (155 lb)   SpO2 97%   BMI 24.28 kg/m     General - The patient is in no distress.  Alert and oriented to person and place, answers questions and cooperates with examination appropriately.   Voice and Breathing - The patient was breathing comfortably without the use of accessory muscles. There was no wheezing, stridor, or stertor.  The patients voice was clear and strong.  Ears - The auricles are normal. The tympanic membranes are normal in appearance, bony landmarks are intact.  No retraction, perforation, or masses.  No fluid or purulence was seen in the external canal or the middle ear. No evidence of infection of the middle ear or external canal, cerumen was normal in appearance.  Eyes - Extraocular movements intact. Sclera were not icteric or injected.  Mouth - Examination of the oral cavity showed pink, healthy mucosa. No lesions or ulcerations noted.  The tongue was mobile and midline.  Throat - The walls of the oropharynx were smooth, symmetric, and had no lesions or ulcerations. The uvula was midline on elevation.   Neurological - Cranial nerves 2 through 12 were grossly intact. House-Brackmann grade 1 out of 6 bilaterally.       Audiologic Studies - An audiogram and tympanogram were performed today as part of the evaluation and personally reviewed. The tympanogram shows a normal Type A curve, with normal canal volume and middle ear pressure.  Some negative pressure on the right.  The right ear hearing was normal.  However the left hearing had moderate mid downsloping to profound high frequency sensorineural hearing loss. No change from 1 week ago.     Procedure - I discussed the risks, benefits, and alternatives to left transtympanic dexamethasone injection including perforation, infection, and possible outcomes including no improvement. Patient agrees and signed the consent. I placed the patient supine and " used the otomicroscope. I used phenol on a small cotton wisp and applied this to the left tympanic membrane. After a good teresa, I used a spinal needle with dexamethasone (10 mg/ml) and injected 0.8 ml into the middle ear space. Although a small amount stayed in the ear canal. The patient laid supine for 20 minutes.       Assessment and Plan - Patty Zambrano is a 73 year old female who returns to me today with a h/o of herpes zoster involving V1 and V2 on the left.  Interestingly she developed sudden onset left-sided sensorineural hearing loss 1-2 weeks after onset of shingles.  No facial nerve weakness.  Therefore I do not think this is a true [place] Hunt syndrome. MRI was normal. She has been adequately treated with Valtrex as well as a prednisone burst and taper.  Unfortunately, she didn't improve with the prednisone. Therefore, I performed left transtympanic dexamethasone injection today. Return in 1 week with audiogram.    Tunde Breen MD  Otolaryngology  [facility]        Again, thank you for allowing me to participate in the care of your patient.        Sincerely,        Tunde Breen MD

## 2019-02-20 NOTE — PROGRESS NOTES
Chief Complaint - hearing loss    History of Present Illness - Patty Zambrano is a 73 year old female who returns to me today with hearing loss in the left ear and tinnitus.  It has been present and noticeable for approximately 2 weeks. She had shingles of the left forehead and temple, and on nose diagnosed about 3-4 weeks ago. She took antivirals (valtrex). She later developed the hearing loss and was given prednisone 60 mg daily for 1 week followed by a taper she just finished. She has some ear and face numbness on the left still. She also has had dizziness for about 2 days, but that got better. No facial nerve weakness. The hearing loss maybe got slightly better. Never had shingles before. No taste issues. The tongue feels a little tingling too, but no pain. No new symptoms to report. MRI was normal.    Past Medical History -   Patient Active Problem List   Diagnosis     Postmenopausal atrophic vaginitis     Sinusitis, chronic     Osteopenia     Hypovitaminosis D     HYPERLIPIDEMIA LDL GOAL <160     HCD (health care directive)     Advanced directives, counseling/discussion     Cataract     Sigmoid diverticulitis     Dysthymia       Current Medications -   Current Outpatient Medications:      ASPIRIN 81 MG OR TABS, 1 TABLET DAILY, Disp: , Rfl:      CALCIUM + D 600-200 MG-UNIT OR TABS, 2 TABLET DAILY, Disp: , Rfl:      Cholecalciferol (VITAMIN D) 2000 UNITS tablet, Take 2,000 Units by mouth daily, Disp: 100 tablet, Rfl: 3     cycloSPORINE (RESTASIS) 0.05 % ophthalmic emulsion, Place 1 drop into both eyes 2 times daily, Disp: , Rfl:      FISH OIL, 1,500 mg. Daily, Disp: , Rfl:      GLUCOSAMINE CHONDRO COMPLEX 500-400 MG OR TABS, 3 tablets by mouth daily, Disp: , Rfl:      MELATONIN PO, Take 3 mg by mouth At Bedtime , Disp: , Rfl:      METAMUCIL OR, Take 1 teaspoonful once daily, Disp: , Rfl:      minoxidil (ROGAINE) 5 % external solution, Apply topically 2 times daily 2%, Disp: , Rfl:      MULTIVITAMIN OR, Take 1  "tablet by mouth daily, Disp: , Rfl:      predniSONE (DELTASONE) 20 MG tablet, Take 60 mg by mouth daily for 1 day, THEN 40 mg daily for 2 days, THEN 20 mg daily for 2 days, THEN 10 mg daily for 2 days., Disp: 10 tablet, Rfl: 0     Probiotic Product (PROBIOTIC ACIDOPHILUS) CAPS, Take 1 capsule by mouth daily , Disp: , Rfl:     Allergies -   Allergies   Allergen Reactions     Augmentin Diarrhea       Social History -   Social History     Socioeconomic History     Marital status:      Spouse name: Not on file     Number of children: Not on file     Years of education: Not on file     Highest education level: Not on file   Social Needs     Financial resource strain: Not on file     Food insecurity - worry: Not on file     Food insecurity - inability: Not on file     Transportation needs - medical: Not on file     Transportation needs - non-medical: Not on file   Occupational History     Not on file   Tobacco Use     Smoking status: Never Smoker     Smokeless tobacco: Never Used   Substance and Sexual Activity     Alcohol use: Yes     Comment: rare     Drug use: No     Sexual activity: Not Currently     Partners: Male   Other Topics Concern     Parent/sibling w/ CABG, MI or angioplasty before 65F 55M? Not Asked   Social History Narrative     Not on file       Family History -   Family History   Problem Relation Age of Onset     Hypertension Mother      Osteoporosis Mother      Cancer Maternal Grandmother         abdominal CA     Heart Disease Maternal Grandfather      Alcohol/Drug Paternal Grandfather      Cancer Brother         sarcoma     Arthritis Father      Alzheimer Disease Father      Review of Systems - As per HPI and PMHx, otherwise 7 system review of the head and neck is negative.    Physical Exam  /86   Pulse 74   Resp 12   Ht 1.702 m (5' 7\")   Wt 70.3 kg (155 lb)   SpO2 97%   BMI 24.28 kg/m    General - The patient is in no distress.  Alert and oriented to person and place, answers " questions and cooperates with examination appropriately.   Voice and Breathing - The patient was breathing comfortably without the use of accessory muscles. There was no wheezing, stridor, or stertor.  The patients voice was clear and strong.  Ears - The auricles are normal. The tympanic membranes are normal in appearance, bony landmarks are intact.  No retraction, perforation, or masses.  No fluid or purulence was seen in the external canal or the middle ear. No evidence of infection of the middle ear or external canal, cerumen was normal in appearance.  Eyes - Extraocular movements intact. Sclera were not icteric or injected.  Mouth - Examination of the oral cavity showed pink, healthy mucosa. No lesions or ulcerations noted.  The tongue was mobile and midline.  Throat - The walls of the oropharynx were smooth, symmetric, and had no lesions or ulcerations. The uvula was midline on elevation.   Neurological - Cranial nerves 2 through 12 were grossly intact. House-Brackmann grade 1 out of 6 bilaterally.       Audiologic Studies - An audiogram and tympanogram were performed today as part of the evaluation and personally reviewed. The tympanogram shows a normal Type A curve, with normal canal volume and middle ear pressure.  Some negative pressure on the right.  The right ear hearing was normal.  However the left hearing had moderate mid downsloping to profound high frequency sensorineural hearing loss. No change from 1 week ago.     Procedure - I discussed the risks, benefits, and alternatives to left transtympanic dexamethasone injection including perforation, infection, and possible outcomes including no improvement. Patient agrees and signed the consent. I placed the patient supine and used the otomicroscope. I used phenol on a small cotton wisp and applied this to the left tympanic membrane. After a good teresa, I used a spinal needle with dexamethasone (10 mg/ml) and injected 0.8 ml into the middle ear space.  Although a small amount stayed in the ear canal. The patient laid supine for 20 minutes.       Assessment and Plan - Patty Zambrano is a 73 year old female who returns to me today with a h/o of herpes zoster involving V1 and V2 on the left.  Interestingly she developed sudden onset left-sided sensorineural hearing loss 1-2 weeks after onset of shingles.  No facial nerve weakness.  Therefore I do not think this is a true [place] Hunt syndrome. MRI was normal. She has been adequately treated with Valtrex as well as a prednisone burst and taper.  Unfortunately, she didn't improve with the prednisone. Therefore, I performed left transtympanic dexamethasone injection today. Return in 1 week with audiogram.    Tunde Breen MD  Otolaryngology  [facility]

## 2019-02-20 NOTE — PATIENT INSTRUCTIONS
General Scheduling Information  To schedule your CT/MRI scan, please contact [place] at [phone]   [address] [place] [address]    To schedule your Surgery, please contact our Specialty Schedulers at [phone]    ENT Clinic Locations Clinic Hours Telephone Number     Leana Smiley  [address] [place] [address]   Tuesday:       8:00am -- 4:00pm    Wednesday:  8:00am - 4:00pm   To schedule an appointment with   Dr. Breen,   please contact our   Specialty Scheduling Department at:     [phone]       Leana Owen  [address] [place] [address]   Friday:          8:00am - 4:00pm         Urgent Care Locations Clinic Hours Telephone Numbers     Leana Rosales  [address] [place] [address]     Monday-Friday:     11:00pm - 9:00pm    Saturday-Sunday:  9:00am - 5:00pm   [phone]     Leana Owen  [address] [place] [address]     Monday-Friday:      5:00pm - 9:00pm     Saturday-Sunday:  9:00am - 5:00pm   [phone]

## 2019-02-28 ENCOUNTER — OFFICE VISIT (OUTPATIENT)
Dept: OTOLARYNGOLOGY | Facility: CLINIC | Age: 74
End: 2019-02-28
Payer: COMMERCIAL

## 2019-02-28 ENCOUNTER — OFFICE VISIT (OUTPATIENT)
Dept: AUDIOLOGY | Facility: CLINIC | Age: 74
End: 2019-02-28
Payer: COMMERCIAL

## 2019-02-28 VITALS — WEIGHT: 155 LBS | BODY MASS INDEX: 24.33 KG/M2 | HEIGHT: 67 IN

## 2019-02-28 DIAGNOSIS — H90.42 SENSORINEURAL HEARING LOSS, UNILATERAL, LEFT EAR, WITH UNRESTRICTED HEARING ON THE CONTRALATERAL SIDE: Primary | ICD-10-CM

## 2019-02-28 DIAGNOSIS — H90.3 SNHL (SENSORY-NEURAL HEARING LOSS), ASYMMETRICAL: Primary | ICD-10-CM

## 2019-02-28 DIAGNOSIS — H93.12 TINNITUS, LEFT: ICD-10-CM

## 2019-02-28 PROCEDURE — 92557 COMPREHENSIVE HEARING TEST: CPT | Performed by: AUDIOLOGIST

## 2019-02-28 PROCEDURE — 92567 TYMPANOMETRY: CPT | Performed by: AUDIOLOGIST

## 2019-02-28 PROCEDURE — 99213 OFFICE O/P EST LOW 20 MIN: CPT | Performed by: OTOLARYNGOLOGY

## 2019-02-28 PROCEDURE — 99207 ZZC NO CHARGE LOS: CPT | Performed by: AUDIOLOGIST

## 2019-02-28 ASSESSMENT — MIFFLIN-ST. JEOR: SCORE: 1245.71

## 2019-02-28 NOTE — PROGRESS NOTES
AUDIOLOGY REPORT:    Patient was referred to Audiology from ENT by Dr. Beren for a hearing examination. Patient reports no perceived improvement in hearing following trans tympanic steroid injection. They were accompanied today by their self.    Testing:    Otoscopy:   Otoscopic exam indicates ears are clear of cerumen bilaterally     Tympanograms:    RIGHT: normal eardrum mobility     LEFT:   normal eardrum mobility    Thresholds:   Pure Tone Thresholds assessed using conventional audiometry with good  reliability from 250-8000 Hz bilaterally using insert earphones     RIGHT:  normal and borderline-normal hearing sensitivity for all frequencies tested.     LEFT:    normal hearing sensitivity through 750 Hz then a drop to moderate to profound sensorineural hearing loss    Speech Reception Threshold:    RIGHT: 15 dB HL    LEFT:   70 dB HL    Word Recognition Score:     RIGHT: 96% at 55 dB HL using NU-6 recorded word list.    LEFT:   32% at 85 dB HL using NU-6 recorded word list.    Discussed results with the patient.     Patient was returned to ENT for follow up.     Rabia Calle CCC-ESA  Licensed Audiologist     Terrell Calle CCC-ESA  Licensed Audiologist  2/28/2019

## 2019-02-28 NOTE — LETTER
2/28/2019         RE: Patty Zambrano  [address] [facility] [address]        Dear Colleague,    Thank you for referring your patient, Patty Zambrano, to the [facility]. Please see a copy of my visit note below.    Chief Complaint - hearing loss    History of Present Illness - Patty Zambrano is a 73 year old female who returns to me today with hearing loss in the left ear and tinnitus.  It has been present and noticeable for approximately 2 weeks. She had shingles of the left forehead and temple, and on nose diagnosed about 4-5 weeks ago. She took antivirals (valtrex). She later developed the hearing loss and was given prednisone 60 mg daily for 1 week followed by a taper she just finished. She has some ear and face numbness on the left still. She also has had dizziness for about 2 days, but that got better. No facial nerve weakness. The hearing loss maybe got slightly better. Never had shingles before. No taste issues. The tongue feels a little tingling too, but no pain. No new symptoms to report. MRI was normal. I Performed a left transtympanic dexamethasone injection one week ago. She returns and notes no improvement in her hearing.  She still has bothersome left-sided tinnitus and struggles hearing especially noise.  Her pain of her face is getting better but she has intermittent numbness sometimes.     Past Medical History -   Patient Active Problem List   Diagnosis     Postmenopausal atrophic vaginitis     Sinusitis, chronic     Osteopenia     Hypovitaminosis D     HYPERLIPIDEMIA LDL GOAL <160     HCD (health care directive)     Advanced directives, counseling/discussion     Cataract     Sigmoid diverticulitis     Dysthymia       Current Medications -   Current Outpatient Medications:      ASPIRIN 81 MG OR TABS, 1 TABLET DAILY, Disp: , Rfl:      CALCIUM + D 600-200 MG-UNIT OR TABS, 2 TABLET DAILY, Disp: , Rfl:      Cholecalciferol (VITAMIN D) 2000 UNITS tablet, Take 2,000 Units by mouth  daily, Disp: 100 tablet, Rfl: 3     cycloSPORINE (RESTASIS) 0.05 % ophthalmic emulsion, Place 1 drop into both eyes 2 times daily, Disp: , Rfl:      FISH OIL, 1,500 mg. Daily, Disp: , Rfl:      GLUCOSAMINE CHONDRO COMPLEX 500-400 MG OR TABS, 3 tablets by mouth daily, Disp: , Rfl:      MELATONIN PO, Take 3 mg by mouth At Bedtime , Disp: , Rfl:      METAMUCIL OR, Take 1 teaspoonful once daily, Disp: , Rfl:      minoxidil (ROGAINE) 5 % external solution, Apply topically 2 times daily 2%, Disp: , Rfl:      MULTIVITAMIN OR, Take 1 tablet by mouth daily, Disp: , Rfl:      Probiotic Product (PROBIOTIC ACIDOPHILUS) CAPS, Take 1 capsule by mouth daily , Disp: , Rfl:     Allergies -   Allergies   Allergen Reactions     Augmentin Diarrhea       Social History -   Social History     Socioeconomic History     Marital status:      Spouse name: Not on file     Number of children: Not on file     Years of education: Not on file     Highest education level: Not on file   Social Needs     Financial resource strain: Not on file     Food insecurity - worry: Not on file     Food insecurity - inability: Not on file     Transportation needs - medical: Not on file     Transportation needs - non-medical: Not on file   Occupational History     Not on file   Tobacco Use     Smoking status: Never Smoker     Smokeless tobacco: Never Used   Substance and Sexual Activity     Alcohol use: Yes     Comment: rare     Drug use: No     Sexual activity: Not Currently     Partners: Male   Other Topics Concern     Parent/sibling w/ CABG, MI or angioplasty before 65F 55M? Not Asked   Social History Narrative     Not on file       Family History -   Family History   Problem Relation Age of Onset     Hypertension Mother      Osteoporosis Mother      Cancer Maternal Grandmother         abdominal CA     Heart Disease Maternal Grandfather      Alcohol/Drug Paternal Grandfather      Cancer Brother         sarcoma     Arthritis Father      Alzheimer  "Disease Father      Review of Systems - As per HPI and PMHx, otherwise 7 system review of the head and neck is negative.    Physical Exam  Ht 1.702 m (5' 7\")   Wt 70.3 kg (155 lb)   BMI 24.28 kg/m     General - The patient is in no distress.  Alert and oriented to person and place, answers questions and cooperates with examination appropriately.   Voice and Breathing - The patient was breathing comfortably without the use of accessory muscles. There was no wheezing, stridor, or stertor.  The patients voice was clear and strong.  Ears - The auricles are normal. The tympanic membranes are normal in appearance, bony landmarks are intact.  No retraction, perforation, or masses.  No fluid or purulence was seen in the external canal or the middle ear. No evidence of infection of the middle ear or external canal, cerumen was normal in appearance.  Eyes - Extraocular movements intact. Sclera were not icteric or injected.  Neurological - Cranial nerves 2 through 12 were grossly intact. House-Brackmann grade 1 out of 6 bilaterally.       Audiologic Studies - An audiogram and tympanogram were performed today as part of the evaluation and personally reviewed. The tympanogram shows a normal Type A curve, with normal canal volume and middle ear pressure. The right ear hearing was normal.  However the left hearing had moderate mid downsloping to profound high frequency sensorineural hearing loss. No change.     Assessment and Plan - Patty Zambrano is a 74 year old female who returns to me today with a h/o of herpes zoster involving V1 and V2 on the left.  Interestingly she developed sudden onset left-sided sensorineural hearing loss 1-2 weeks after onset of shingles. This was 3 weeks ago. No facial nerve weakness.  Therefore I do not think this is a true [place] Hunt syndrome. MRI was normal. She has been adequately treated with Valtrex as well as a prednisone burst and taper. I performed a left transtympanic dexamethasone " injection 1 week ago.  Unfortunately her hearing is still the same.  I explained to her that it is doubtful that this will return.  I think she should consider pursuing a cross aid or bi-cross aid. Recheck hearing in 6 months.    Tunde Breen MD  Otolaryngology  [facility]        Again, thank you for allowing me to participate in the care of your patient.        Sincerely,        Tunde Breen MD

## 2019-02-28 NOTE — PATIENT INSTRUCTIONS
General Scheduling Information  To schedule your CT/MRI scan, please contact [place] [facility] at [phone]   [address] [place] [address]    To schedule your Surgery, please contact our Specialty Schedulers at [phone]    ENT Clinic Locations Clinic Hours Telephone Number     Leana Smiley  [address] [place] [address]   Tuesday:       8:00am -- 4:00pm    Wednesday:  8:00am - 4:00pm   To schedule an appointment with   Dr. Breen,   please contact our   Specialty Scheduling Department at:     [phone]       Leana Owen  [address] [place] [address]   Friday:          8:00am - 4:00pm         Urgent Care Locations Clinic Hours Telephone Numbers     Leana Rosales  [address] [place] [address]     Monday-Friday:     11:00pm - 9:00pm    Saturday-Sunday:  9:00am - 5:00pm   [phone]     Leana Owen  [address] [place] [address]     Monday-Friday:      5:00pm - 9:00pm     Saturday-Sunday:  9:00am - 5:00pm   [phone]

## 2019-02-28 NOTE — PROGRESS NOTES
Chief Complaint - hearing loss    History of Present Illness - Patty Zambrano is a 73 year old female who returns to me today with hearing loss in the left ear and tinnitus.  It has been present and noticeable for approximately 2 weeks. She had shingles of the left forehead and temple, and on nose diagnosed about 4-5 weeks ago. She took antivirals (valtrex). She later developed the hearing loss and was given prednisone 60 mg daily for 1 week followed by a taper she just finished. She has some ear and face numbness on the left still. She also has had dizziness for about 2 days, but that got better. No facial nerve weakness. The hearing loss maybe got slightly better. Never had shingles before. No taste issues. The tongue feels a little tingling too, but no pain. No new symptoms to report. MRI was normal. I Performed a left transtympanic dexamethasone injection one week ago. She returns and notes no improvement in her hearing.  She still has bothersome left-sided tinnitus and struggles hearing especially noise.  Her pain of her face is getting better but she has intermittent numbness sometimes.     Past Medical History -   Patient Active Problem List   Diagnosis     Postmenopausal atrophic vaginitis     Sinusitis, chronic     Osteopenia     Hypovitaminosis D     HYPERLIPIDEMIA LDL GOAL <160     HCD (health care directive)     Advanced directives, counseling/discussion     Cataract     Sigmoid diverticulitis     Dysthymia       Current Medications -   Current Outpatient Medications:      ASPIRIN 81 MG OR TABS, 1 TABLET DAILY, Disp: , Rfl:      CALCIUM + D 600-200 MG-UNIT OR TABS, 2 TABLET DAILY, Disp: , Rfl:      Cholecalciferol (VITAMIN D) 2000 UNITS tablet, Take 2,000 Units by mouth daily, Disp: 100 tablet, Rfl: 3     cycloSPORINE (RESTASIS) 0.05 % ophthalmic emulsion, Place 1 drop into both eyes 2 times daily, Disp: , Rfl:      FISH OIL, 1,500 mg. Daily, Disp: , Rfl:      GLUCOSAMINE CHONDRO COMPLEX 500-400 MG OR  "TABS, 3 tablets by mouth daily, Disp: , Rfl:      MELATONIN PO, Take 3 mg by mouth At Bedtime , Disp: , Rfl:      METAMUCIL OR, Take 1 teaspoonful once daily, Disp: , Rfl:      minoxidil (ROGAINE) 5 % external solution, Apply topically 2 times daily 2%, Disp: , Rfl:      MULTIVITAMIN OR, Take 1 tablet by mouth daily, Disp: , Rfl:      Probiotic Product (PROBIOTIC ACIDOPHILUS) CAPS, Take 1 capsule by mouth daily , Disp: , Rfl:     Allergies -   Allergies   Allergen Reactions     Augmentin Diarrhea       Social History -   Social History     Socioeconomic History     Marital status:      Spouse name: Not on file     Number of children: Not on file     Years of education: Not on file     Highest education level: Not on file   Social Needs     Financial resource strain: Not on file     Food insecurity - worry: Not on file     Food insecurity - inability: Not on file     Transportation needs - medical: Not on file     Transportation needs - non-medical: Not on file   Occupational History     Not on file   Tobacco Use     Smoking status: Never Smoker     Smokeless tobacco: Never Used   Substance and Sexual Activity     Alcohol use: Yes     Comment: rare     Drug use: No     Sexual activity: Not Currently     Partners: Male   Other Topics Concern     Parent/sibling w/ CABG, MI or angioplasty before 65F 55M? Not Asked   Social History Narrative     Not on file       Family History -   Family History   Problem Relation Age of Onset     Hypertension Mother      Osteoporosis Mother      Cancer Maternal Grandmother         abdominal CA     Heart Disease Maternal Grandfather      Alcohol/Drug Paternal Grandfather      Cancer Brother         sarcoma     Arthritis Father      Alzheimer Disease Father      Review of Systems - As per HPI and PMHx, otherwise 7 system review of the head and neck is negative.    Physical Exam  Ht 1.702 m (5' 7\")   Wt 70.3 kg (155 lb)   BMI 24.28 kg/m    General - The patient is in no distress. "  Alert and oriented to person and place, answers questions and cooperates with examination appropriately.   Voice and Breathing - The patient was breathing comfortably without the use of accessory muscles. There was no wheezing, stridor, or stertor.  The patients voice was clear and strong.  Ears - The auricles are normal. The tympanic membranes are normal in appearance, bony landmarks are intact.  No retraction, perforation, or masses.  No fluid or purulence was seen in the external canal or the middle ear. No evidence of infection of the middle ear or external canal, cerumen was normal in appearance.  Eyes - Extraocular movements intact. Sclera were not icteric or injected.  Neurological - Cranial nerves 2 through 12 were grossly intact. House-Brackmann grade 1 out of 6 bilaterally.       Audiologic Studies - An audiogram and tympanogram were performed today as part of the evaluation and personally reviewed. The tympanogram shows a normal Type A curve, with normal canal volume and middle ear pressure. The right ear hearing was normal.  However the left hearing had moderate mid downsloping to profound high frequency sensorineural hearing loss. No change.     Assessment and Plan - Patty Zambrano is a 74 year old female who returns to me today with a h/o of herpes zoster involving V1 and V2 on the left.  Interestingly she developed sudden onset left-sided sensorineural hearing loss 1-2 weeks after onset of shingles. This was 3 weeks ago. No facial nerve weakness.  Therefore I do not think this is a true [place] Hunt syndrome. MRI was normal. She has been adequately treated with Valtrex as well as a prednisone burst and taper. I performed a left transtympanic dexamethasone injection 1 week ago.  Unfortunately her hearing is still the same.  I explained to her that it is doubtful that this will return.  I think she should consider pursuing a cross aid or bi-cross aid. Recheck hearing in 6 months.    Tunde Breen  MD  Otolaryngology  [facility]

## 2019-07-03 ENCOUNTER — ALLIED HEALTH/NURSE VISIT (OUTPATIENT)
Dept: FAMILY MEDICINE | Facility: CLINIC | Age: 74
End: 2019-07-03
Payer: COMMERCIAL

## 2019-07-03 DIAGNOSIS — Z23 NEED FOR VACCINATION: Primary | ICD-10-CM

## 2019-07-03 PROCEDURE — 90471 IMMUNIZATION ADMIN: CPT

## 2019-07-03 PROCEDURE — 99207 ZZC NO CHARGE NURSE ONLY: CPT

## 2019-07-03 NOTE — NURSING NOTE
Chief Complaint   Patient presents with     Imm/Inj     shingles immunization      PATIENT PAID AT PHARMACY    Prior to injection, verified patient identity using patient's name and date of birth.  Due to injection administration, patient instructed to remain in clinic for 15 minutes  afterwards, and to report any adverse reaction to me immediately.1st shingles vaccine  Drug Amount Wasted:  None.  Vial/Syringe: Single dose vial    Screening Questionnaire for Adult Immunization  Are you sick today?   No   Do you have allergies to medications, food, a vaccine component or latex?   No   Have you ever had a serious reaction after receiving a vaccination?   No   Do you have a long-term health problem with heart disease, lung disease, asthma, kidney disease, metabolic disease (e.g. diabetes), anemia, or other blood disorder?   No   Do you have cancer, leukemia, HIV/AIDS, or any other immune system problem?   No   In the past 3 months, have you taken medications that affect  your immune system, such as prednisone, other steroids, or anticancer drugs; drugs for the treatment of rheumatoid arthritis, Crohn s disease, or psoriasis; or have you had radiation treatments?   No   Have you had a seizure, or a brain or other nervous system problem?   No   During the past year, have you received a transfusion of blood or blood     products, or been given immune (gamma) globulin or antiviral drug?   No   For women: Are you pregnant or is there a chance you could become        pregnant during the next month?   No   Have you received any vaccinations in the past 4 weeks?   No     Immunization questionnaire answers were all negative.      Per orders of Dr. Cortez, injection of 1st shingles given by Nia Roldan. Patient instructed to remain in clinic for 15 minutes afterwards, and to report any adverse reaction to me immediately.       Nia Roldan MA

## 2019-08-20 ENCOUNTER — OFFICE VISIT (OUTPATIENT)
Dept: AUDIOLOGY | Facility: CLINIC | Age: 74
End: 2019-08-20
Payer: COMMERCIAL

## 2019-08-20 ENCOUNTER — OFFICE VISIT (OUTPATIENT)
Dept: OTOLARYNGOLOGY | Facility: CLINIC | Age: 74
End: 2019-08-20
Payer: COMMERCIAL

## 2019-08-20 VITALS
HEIGHT: 67 IN | OXYGEN SATURATION: 99 % | SYSTOLIC BLOOD PRESSURE: 114 MMHG | DIASTOLIC BLOOD PRESSURE: 72 MMHG | WEIGHT: 155 LBS | BODY MASS INDEX: 24.33 KG/M2 | HEART RATE: 75 BPM

## 2019-08-20 DIAGNOSIS — H90.3 SNHL (SENSORY-NEURAL HEARING LOSS), ASYMMETRICAL: Primary | ICD-10-CM

## 2019-08-20 DIAGNOSIS — H90.3 SENSORINEURAL HEARING LOSS, BILATERAL: Primary | ICD-10-CM

## 2019-08-20 PROCEDURE — 92557 COMPREHENSIVE HEARING TEST: CPT | Performed by: AUDIOLOGIST

## 2019-08-20 PROCEDURE — 92550 TYMPANOMETRY & REFLEX THRESH: CPT | Performed by: AUDIOLOGIST

## 2019-08-20 PROCEDURE — 99207 ZZC NO CHARGE LOS: CPT | Performed by: AUDIOLOGIST

## 2019-08-20 PROCEDURE — 99213 OFFICE O/P EST LOW 20 MIN: CPT | Performed by: OTOLARYNGOLOGY

## 2019-08-20 ASSESSMENT — MIFFLIN-ST. JEOR: SCORE: 1245.71

## 2019-08-20 NOTE — LETTER
8/20/2019         RE: Patty Zambrano  [address] [facility] [address]        Dear Colleague,    Thank you for referring your patient, Patty Zambrano, to the [facility]. Please see a copy of my visit note below.    Chief Complaint - hearing loss    History of Present Illness - Patty Zambrano is a 73 year old female who returns to me today with hearing loss in the left ear and tinnitus. She had shingles of the left forehead and temple, and on nose diagnosed 2/2019. She took antivirals (valtrex). She later developed the hearing loss and was given prednisone 60 mg daily for 1 week followed by a taper. She also has had dizziness for about 2 days. No facial nerve weakness. MRI was normal. I Performed a left transtympanic dexamethasone injection. She had no improvement in her hearing. She returns and notes persistent left hearing loss.  She also has some tinnitus.  Overall she feels she is adapted well.  She has not tried a hearing aid. Her pain of her face has resolved.    Past Medical History -   Patient Active Problem List   Diagnosis     Postmenopausal atrophic vaginitis     Sinusitis, chronic     Osteopenia     Hypovitaminosis D     HYPERLIPIDEMIA LDL GOAL <160     HCD (health care directive)     Advanced directives, counseling/discussion     Cataract     Sigmoid diverticulitis     Dysthymia       Current Medications -   Current Outpatient Medications:      ASPIRIN 81 MG OR TABS, 1 TABLET DAILY, Disp: , Rfl:      CALCIUM + D 600-200 MG-UNIT OR TABS, 2 TABLET DAILY, Disp: , Rfl:      Cholecalciferol (VITAMIN D) 2000 UNITS tablet, Take 2,000 Units by mouth daily, Disp: 100 tablet, Rfl: 3     cycloSPORINE (RESTASIS) 0.05 % ophthalmic emulsion, Place 1 drop into both eyes 2 times daily, Disp: , Rfl:      FISH OIL, 1,500 mg. Daily, Disp: , Rfl:      GLUCOSAMINE CHONDRO COMPLEX 500-400 MG OR TABS, 3 tablets by mouth daily, Disp: , Rfl:      MELATONIN PO, Take 3 mg by mouth At Bedtime , Disp: , Rfl:       METAMUCIL OR, Take 1 teaspoonful once daily, Disp: , Rfl:      minoxidil (ROGAINE) 5 % external solution, Apply topically 2 times daily 2%, Disp: , Rfl:      MULTIVITAMIN OR, Take 1 tablet by mouth daily, Disp: , Rfl:      Probiotic Product (PROBIOTIC ACIDOPHILUS) CAPS, Take 1 capsule by mouth daily , Disp: , Rfl:     Allergies -   Allergies   Allergen Reactions     Augmentin Diarrhea       Social History -   Social History     Socioeconomic History     Marital status:      Spouse name: Not on file     Number of children: Not on file     Years of education: Not on file     Highest education level: Not on file   Social Needs     Financial resource strain: Not on file     Food insecurity - worry: Not on file     Food insecurity - inability: Not on file     Transportation needs - medical: Not on file     Transportation needs - non-medical: Not on file   Occupational History     Not on file   Tobacco Use     Smoking status: Never Smoker     Smokeless tobacco: Never Used   Substance and Sexual Activity     Alcohol use: Yes     Comment: rare     Drug use: No     Sexual activity: Not Currently     Partners: Male   Other Topics Concern     Parent/sibling w/ CABG, MI or angioplasty before 65F 55M? Not Asked   Social History Narrative     Not on file       Family History -   Family History   Problem Relation Age of Onset     Hypertension Mother      Osteoporosis Mother      Cancer Maternal Grandmother         abdominal CA     Heart Disease Maternal Grandfather      Alcohol/Drug Paternal Grandfather      Cancer Brother         sarcoma     Arthritis Father      Alzheimer Disease Father      Review of Systems - As per HPI and PMHx, otherwise 7 system review of the head and neck is negative.    Physical Exam  General - The patient is in no distress.  Alert and oriented to person and place, answers questions and cooperates with examination appropriately.   Voice and Breathing - The patient was breathing comfortably without  the use of accessory muscles. There was no wheezing, stridor, or stertor.  The patients voice was clear and strong.  Ears - The auricles are normal. The tympanic membranes are normal in appearance, bony landmarks are intact.  No retraction, perforation, or masses.  No fluid or purulence was seen in the external canal or the middle ear. No evidence of infection of the middle ear or external canal, cerumen was normal in appearance.  Eyes - Extraocular movements intact. Sclera were not icteric or injected.  Neurological - Cranial nerves 2 through 12 were grossly intact. House-Brackmann grade 1 out of 6 bilaterally.   Neck -soft, nontender, no palpable lymphadenopathy.    Audiologic Studies - An audiogram and tympanogram were performed today as part of the evaluation and personally reviewed. The tympanogram shows a normal Type A curve, with normal canal volume and middle ear pressure. The right ear hearing was normal.  However the left hearing had moderate mid downsloping to profound high frequency sensorineural hearing loss. No significant change in pure tones but her word recognition scores did increase to 56%.    Assessment and Plan - Patty Zambrano is a 74 year old female who returns to me today with a h/o of herpes zoster involving V1 and V2 on the left.  Interestingly she developed sudden onset left-sided sensorineural hearing loss 1-2 weeks after onset of the herpes zoster. This was 6 months ago. No facial nerve weakness.  Therefore I do not think this is a true Criselda Hunt syndrome. MRI was normal. She has been adequately treated with Valtrex as well as a prednisone burst and taper. I performed a left transtympanic dexamethasone injection, but unfortunately her hearing was still the same.  She returns and notes no significant change.  Although she is adapting quite well.  She has not tried hearing aid.  We did discuss the possibility of a conventional hearing aid on the left versus a cross aid.  She could also  consider observing this.  I recommend rechecking her hearing in 1 to 2 years, sooner if anything worsens.  We also discussed hearing protection especially in the right ear given that it is her good hearing ear.    Tunde Breen MD  Otolaryngology  [facility]        Again, thank you for allowing me to participate in the care of your patient.        Sincerely,        Tunde Breen MD

## 2019-08-20 NOTE — PROGRESS NOTES
AUDIOLOGY REPORT:    Patient was referred from ENT by Dr. Breen for audiology evaluation. 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 She returns today for follow up. The patient reports that her hearing has not changed or improved since she was last tested. She reports constant tinnitus in the left ear. A previous audiogram on 2/28/2019 shows normal hearing sensitivity in the right ear and normal hearing sloping to moderately severe to profound sensorineural hearing loss in the left ear.    Testing:    Otoscopy:   Otoscopic exam indicates ears are clear of cerumen bilaterally     Tympanograms:    RIGHT: normal eardrum mobility     LEFT:   hypercompliant eardrum mobility    Reflexes (reported by stimulus ear):  RIGHT: Ipsilateral is present at normal levels  RIGHT: Contralateral is present at normal levels  LEFT:   Ipsilateral is present at normal levels  LEFT:   Contralateral is present at normal levels    Thresholds:   Pure Tone Thresholds assessed using conventional audiometry with good reliability from 250-8000 Hz bilaterally using insert earphones and circumaural headphones     RIGHT:  normal hearing sensitivity at all tested frequencies with the exception of mild sensorineural hearing loss at 3000 Hz    LEFT:    normal hearing sensitivity through 750 Hz sloping to moderately severe to profound sensorineural hearing loss    Speech Reception Threshold:    RIGHT: 15 dB HL    LEFT:   45 dB HL  Results are in agreement with pure tone average.     Word Recognition Score:     RIGHT: 100% at 60 dB HL using NU-6 recorded word list.    LEFT:   56% at 85 dB HL using NU-6 recorded word list.    Discussed results with the patient. Thresholds are essentially stable compared to 2/28/2019. Word recognition in the right ear is slightly improved.    Patient was returned to ENT for follow up.     Yadiel Flores, CCC-A  Licensed Audiologist #[id number]  8/20/2019

## 2019-08-20 NOTE — PROGRESS NOTES
Chief Complaint - hearing loss    History of Present Illness - Patty Zambrano is a 73 year old female who returns to me today with hearing loss in the left ear and tinnitus. She had shingles of the left forehead and temple, and on nose diagnosed 2/2019. She took antivirals (valtrex). She later developed the hearing loss and was given prednisone 60 mg daily for 1 week followed by a taper. She also has had dizziness for about 2 days. No facial nerve weakness. MRI was normal. I Performed a left transtympanic dexamethasone injection. She had no improvement in her hearing. She returns and notes persistent left hearing loss.  She also has some tinnitus.  Overall she feels she is adapted well.  She has not tried a hearing aid. Her pain of her face has resolved.    Past Medical History -   Patient Active Problem List   Diagnosis     Postmenopausal atrophic vaginitis     Sinusitis, chronic     Osteopenia     Hypovitaminosis D     HYPERLIPIDEMIA LDL GOAL <160     HCD (health care directive)     Advanced directives, counseling/discussion     Cataract     Sigmoid diverticulitis     Dysthymia       Current Medications -   Current Outpatient Medications:      ASPIRIN 81 MG OR TABS, 1 TABLET DAILY, Disp: , Rfl:      CALCIUM + D 600-200 MG-UNIT OR TABS, 2 TABLET DAILY, Disp: , Rfl:      Cholecalciferol (VITAMIN D) 2000 UNITS tablet, Take 2,000 Units by mouth daily, Disp: 100 tablet, Rfl: 3     cycloSPORINE (RESTASIS) 0.05 % ophthalmic emulsion, Place 1 drop into both eyes 2 times daily, Disp: , Rfl:      FISH OIL, 1,500 mg. Daily, Disp: , Rfl:      GLUCOSAMINE CHONDRO COMPLEX 500-400 MG OR TABS, 3 tablets by mouth daily, Disp: , Rfl:      MELATONIN PO, Take 3 mg by mouth At Bedtime , Disp: , Rfl:      METAMUCIL OR, Take 1 teaspoonful once daily, Disp: , Rfl:      minoxidil (ROGAINE) 5 % external solution, Apply topically 2 times daily 2%, Disp: , Rfl:      MULTIVITAMIN OR, Take 1 tablet by mouth daily, Disp: , Rfl:      Probiotic  Product (PROBIOTIC ACIDOPHILUS) CAPS, Take 1 capsule by mouth daily , Disp: , Rfl:     Allergies -   Allergies   Allergen Reactions     Augmentin Diarrhea       Social History -   Social History     Socioeconomic History     Marital status:      Spouse name: Not on file     Number of children: Not on file     Years of education: Not on file     Highest education level: Not on file   Social Needs     Financial resource strain: Not on file     Food insecurity - worry: Not on file     Food insecurity - inability: Not on file     Transportation needs - medical: Not on file     Transportation needs - non-medical: Not on file   Occupational History     Not on file   Tobacco Use     Smoking status: Never Smoker     Smokeless tobacco: Never Used   Substance and Sexual Activity     Alcohol use: Yes     Comment: rare     Drug use: No     Sexual activity: Not Currently     Partners: Male   Other Topics Concern     Parent/sibling w/ CABG, MI or angioplasty before 65F 55M? Not Asked   Social History Narrative     Not on file       Family History -   Family History   Problem Relation Age of Onset     Hypertension Mother      Osteoporosis Mother      Cancer Maternal Grandmother         abdominal CA     Heart Disease Maternal Grandfather      Alcohol/Drug Paternal Grandfather      Cancer Brother         sarcoma     Arthritis Father      Alzheimer Disease Father      Review of Systems - As per HPI and PMHx, otherwise 7 system review of the head and neck is negative.    Physical Exam  General - The patient is in no distress.  Alert and oriented to person and place, answers questions and cooperates with examination appropriately.   Voice and Breathing - The patient was breathing comfortably without the use of accessory muscles. There was no wheezing, stridor, or stertor.  The patients voice was clear and strong.  Ears - The auricles are normal. The tympanic membranes are normal in appearance, bony landmarks are intact.  No  retraction, perforation, or masses.  No fluid or purulence was seen in the external canal or the middle ear. No evidence of infection of the middle ear or external canal, cerumen was normal in appearance.  Eyes - Extraocular movements intact. Sclera were not icteric or injected.  Neurological - Cranial nerves 2 through 12 were grossly intact. House-Brackmann grade 1 out of 6 bilaterally.   Neck -soft, nontender, no palpable lymphadenopathy.    Audiologic Studies - An audiogram and tympanogram were performed today as part of the evaluation and personally reviewed. The tympanogram shows a normal Type A curve, with normal canal volume and middle ear pressure. The right ear hearing was normal.  However the left hearing had moderate mid downsloping to profound high frequency sensorineural hearing loss. No significant change in pure tones but her word recognition scores did increase to 56%.    Assessment and Plan - Patty Zambrano is a 74 year old female who returns to me today with a h/o of herpes zoster involving V1 and V2 on the left.  Interestingly she developed sudden onset left-sided sensorineural hearing loss 1-2 weeks after onset of the herpes zoster. This was 6 months ago. No facial nerve weakness.  Therefore I do not think this is a true Criselda Hunt syndrome. MRI was normal. She has been adequately treated with Valtrex as well as a prednisone burst and taper. I performed a left transtympanic dexamethasone injection, but unfortunately her hearing was still the same.  She returns and notes no significant change.  Although she is adapting quite well.  She has not tried hearing aid.  We did discuss the possibility of a conventional hearing aid on the left versus a cross aid.  She could also consider observing this.  I recommend rechecking her hearing in 1 to 2 years, sooner if anything worsens.  We also discussed hearing protection especially in the right ear given that it is her good hearing ear.    Tunde Breen  MD  Otolaryngology  [facility]

## 2019-08-20 NOTE — PATIENT INSTRUCTIONS
General Scheduling Information  To schedule your CT/MRI scan, please contact [place] [facility] at [phone]   [address] [place] [address]    To schedule your Surgery, please contact our Specialty Schedulers at [phone]    ENT Clinic Locations Clinic Hours Telephone Number     Leana Smiley  [address] [place] [address]   Tuesday:       8:00am -- 4:00pm    Wednesday:  8:00am - 4:00pm   To schedule an appointment with   Dr. Breen,   please contact our   Specialty Scheduling Department at:     [phone]       Leana Owen  [address] [place] [address]   Friday:          8:00am - 4:00pm         Urgent Care Locations Clinic Hours Telephone Numbers     Leana Rosales  [address] [place] [address]     Monday-Friday:     11:00pm - 9:00pm    Saturday-Sunday:  9:00am - 5:00pm   [phone]     Leana Owen  [address] [place] [address]     Monday-Friday:      5:00pm - 9:00pm     Saturday-Sunday:  9:00am - 5:00pm   [phone]

## 2019-09-04 ENCOUNTER — TRANSFERRED RECORDS (OUTPATIENT)
Dept: HEALTH INFORMATION MANAGEMENT | Facility: CLINIC | Age: 74
End: 2019-09-04

## 2019-09-17 ENCOUNTER — OFFICE VISIT (OUTPATIENT)
Dept: FAMILY MEDICINE | Facility: CLINIC | Age: 74
End: 2019-09-17
Payer: COMMERCIAL

## 2019-09-17 ENCOUNTER — TELEPHONE (OUTPATIENT)
Dept: INTERNAL MEDICINE | Facility: CLINIC | Age: 74
End: 2019-09-17

## 2019-09-17 VITALS
OXYGEN SATURATION: 98 % | TEMPERATURE: 97.8 F | SYSTOLIC BLOOD PRESSURE: 118 MMHG | HEART RATE: 104 BPM | RESPIRATION RATE: 12 BRPM | BODY MASS INDEX: 24.12 KG/M2 | WEIGHT: 154 LBS | DIASTOLIC BLOOD PRESSURE: 62 MMHG

## 2019-09-17 DIAGNOSIS — Z00.00 ENCOUNTER FOR MEDICARE ANNUAL WELLNESS EXAM: Primary | ICD-10-CM

## 2019-09-17 DIAGNOSIS — Z23 NEED FOR PROPHYLACTIC VACCINATION AND INOCULATION AGAINST INFLUENZA: ICD-10-CM

## 2019-09-17 DIAGNOSIS — F33.8 SEASONAL AFFECTIVE DISORDER (H): ICD-10-CM

## 2019-09-17 PROCEDURE — 90662 IIV NO PRSV INCREASED AG IM: CPT | Performed by: INTERNAL MEDICINE

## 2019-09-17 PROCEDURE — G0008 ADMIN INFLUENZA VIRUS VAC: HCPCS | Performed by: INTERNAL MEDICINE

## 2019-09-17 PROCEDURE — 99397 PER PM REEVAL EST PAT 65+ YR: CPT | Mod: 25 | Performed by: INTERNAL MEDICINE

## 2019-09-17 PROCEDURE — 99214 OFFICE O/P EST MOD 30 MIN: CPT | Mod: 25 | Performed by: INTERNAL MEDICINE

## 2019-09-17 RX ORDER — SERTRALINE HYDROCHLORIDE 25 MG/1
25 TABLET, FILM COATED ORAL DAILY
Qty: 90 TABLET | Refills: 3 | Status: SHIPPED | OUTPATIENT
Start: 2019-09-17 | End: 2020-09-22

## 2019-09-17 ASSESSMENT — ACTIVITIES OF DAILY LIVING (ADL): CURRENT_FUNCTION: NO ASSISTANCE NEEDED

## 2019-09-17 ASSESSMENT — PATIENT HEALTH QUESTIONNAIRE - PHQ9: SUM OF ALL RESPONSES TO PHQ QUESTIONS 1-9: 3

## 2019-09-17 NOTE — LETTER
[facility] Advance Care Planning  [facility]  [address] [place] [address]    9/17/2019    Patty CHAVARRIA Kenya  [address] [facility] [id number]    Dear Ms. Zambrano,    We are writing on behalf of [facility] and [facility]. [facility] is the department which coordinates documentation of decision-making authority.  Care providers are required to have copies of legal documents when a patient is unable to make their own health care decisions and the person has documented health care wishes and/or appointed a health care agent to make decisions on their behalf.      We have reviewed the Health Care Directive dated 3/2/11 which you presented for addition to   your medical record. Unfortunately, our review indicates the document is not legally valid for   the following reason(s): your document indicates you were attaching additional information. We did not receive any additional pages with your document.  In order to create a legal document you will need to send us a copy of the attachments for this document. If you did not intend to state you had attachments, please notify us by mail or the email address below.     We greatly value the opportunity to assist you in documenting your choices and to honor your   wishes. We apologize for any inconvenience. We have several options to assist you in updating   your document so it meets legal requirements.       Health Care Directives and Advance Care Planning resources can be viewed and printed   for free at our web site:[email or web address].       Free group classes on Advance Care Planning and completing a Health Care Directive are  available at multiple locations and times. These classes are led by trained staff who will   provide information and guide you through a Health Care Directive. They can also review, notarize and add your Health Care Directive to your medical record.  [place] for a class at [email or web address] or by calling [email or web address] at [phone] or toll free [phone].      COPIES of completed Health Care Directives can be brought or mailed to any of our   locations, including the address listed below. You can also email a copy to [email or web address] .       For additional assistance or questions you can email us at [email or web address] or call [phone]      Sincerely,     [facility]  [facility], [place] [address]   Email us: [email or web address] Call us: [phone]  Visit at: [email or web address]

## 2019-09-17 NOTE — PROGRESS NOTES
"SUBJECTIVE:   Patty Zambrano is a 74 year old female who presents for Preventive Visit.      Are you in the first 12 months of your Medicare coverage?  No    Healthy Habits:    In general, how would you rate your overall health?  Excellent    Frequency of exercise:  4-5 days/week    Duration of exercise:  30-45 minutes    Do you usually eat at least 4 servings of fruit and vegetables a day, include whole grains    & fiber and avoid regularly eating high fat or \"junk\" foods?  Yes    Taking medications regularly:  Yes    Barriers to taking medications:  None    Medication side effects:  None    Ability to successfully perform activities of daily living:  No assistance needed    Home Safety:  Throw rugs in the hallway and lack of grab bars in the bathroom    Hearing Impairment:  Difficulty following a conversation in a noisy restaurant or crowded room    In the past 6 months, have you been bothered by leaking of urine?  No    In general, how would you rate your overall mental or emotional health?  Very good      PHQ-2 Total Score:    Additional concerns today:  Yes    Update provider in couple things:  Hearing problem: [place] to the ENT (in [place]), has left ear is worst. Hearing loss in the left ear which is permanent.  Is considering hearing aides. Is coping well w/the hearing loss.  Muscular: Seeing ortho for the left knee :   Question about Dysthymia:  Feels may have SAD, because mood symptoms are worse in winter.  Has tried Lew-E w/out much benefit.         Do you feel safe in your environment? Yes    Do you have a Health Care Directive? Yes: Advance Directive has been received and scanned.      Fall risk  Fallen 2 or more times in the past year?: No  Any fall with injury in the past year?: No    Cognitive Screening   1) Repeat 3 items (Leader, Season, Table)  All 3 words repeated  2) Clock draw: NORMAL  3) 3 item recall: Recalls 3 objects  Results: 3 items recalled: COGNITIVE IMPAIRMENT LESS " LIKELY    Mini-CogTM Copyright ARLYN Gutierrez. Licensed by the author for use in [facility]; reprinted with permission (sonia@.[facility]). All rights reserved.      Do you have sleep apnea, excessive snoring or daytime drowsiness?: yes - Snore    Reviewed and updated as needed this visit by clinical staff  Tobacco  Allergies  Meds  Problems  Med Hx  Surg Hx  Fam Hx  Soc Hx          Reviewed and updated as needed this visit by Provider  Allergies  Meds  Problems        Social History     Tobacco Use     Smoking status: Never Smoker     Smokeless tobacco: Never Used   Substance Use Topics     Alcohol use: Yes     Comment: rare     If you drink alcohol do you typically have >3 drinks per day or >7 drinks per week? No    Alcohol Use 9/17/2019   Prescreen: >3 drinks/day or >7 drinks/week? No               Current providers sharing in care for this patient include:   Patient Care Team:  Jenny Guzman DO as PCP - General (Internal Medicine)  Jenny Guzman DO as Assigned PCP    The following health maintenance items are reviewed in Epic and correct as of today:  Health Maintenance   Topic Date Due     DEPRESSION ACTION PLAN  1945     PHQ-9  01/12/2018     FALL RISK ASSESSMENT  08/29/2019     INFLUENZA VACCINE (1) 09/01/2019     ZOSTER IMMUNIZATION (3 of 3) 08/28/2019     MEDICARE ANNUAL WELLNESS VISIT  08/29/2019     MAMMO SCREENING  08/29/2020     DTAP/TDAP/TD IMMUNIZATION (3 - Td) 05/31/2021     ADVANCE CARE PLANNING  07/12/2022     LIPID  09/04/2023     COLONOSCOPY  12/15/2026     DEXA  Completed     HEPATITIS C SCREENING  Completed     PNEUMOCOCCAL IMMUNIZATION 65+ LOW/MEDIUM RISK  Completed     IPV IMMUNIZATION  Aged Out     MENINGITIS IMMUNIZATION  Aged Out     Current Outpatient Medications   Medication Sig Dispense Refill     ASPIRIN 81 MG OR TABS 1 TABLET DAILY       CALCIUM + D 600-200 MG-UNIT OR TABS 2 TABLET DAILY       Cholecalciferol (VITAMIN D) 2000 UNITS tablet Take 2,000  "Units by mouth daily 100 tablet 3     cycloSPORINE (RESTASIS) 0.05 % ophthalmic emulsion Place 1 drop into both eyes 2 times daily       FISH OIL 1,500 mg. Daily       GLUCOSAMINE CHONDRO COMPLEX 500-400 MG OR TABS 3 tablets by mouth daily       MELATONIN PO Take 3 mg by mouth At Bedtime        METAMUCIL OR Take 1 teaspoonful once daily       minoxidil (ROGAINE) 5 % external solution Apply topically 2 times daily 2%       MULTIVITAMIN OR Take 1 tablet by mouth daily       Probiotic Product (PROBIOTIC ACIDOPHILUS) CAPS Take 1 capsule by mouth daily        sertraline (ZOLOFT) 25 MG tablet Take 1 tablet (25 mg) by mouth daily 90 tablet 3         Review of Systems  Constitutional, HEENT, cardiovascular, pulmonary, GI, , musculoskeletal, neuro, skin, endocrine and psych systems are negative, except as otherwise noted.    OBJECTIVE:   /62   Pulse 104   Temp 97.8  F (36.6  C) (Tympanic)   Resp 12   Wt 69.9 kg (154 lb)   SpO2 98%   Breastfeeding? No   BMI 24.12 kg/m   Estimated body mass index is 24.12 kg/m  as calculated from the following:    Height as of 8/20/19: 1.702 m (5' 7\").    Weight as of this encounter: 69.9 kg (154 lb).  Physical Exam  GENERAL APPEARANCE: healthy, alert and no distress  EYES: Eyes grossly normal to inspection, PERRL and conjunctivae and sclerae normal  HENT: ear canals and TM's normal, nose and mouth without ulcers or lesions, oropharynx clear and oral mucous membranes moist  NECK: no adenopathy, no asymmetry, masses, or scars and thyroid normal to palpation  RESP: lungs clear to auscultation - no rales, rhonchi or wheezes  CV: regular rate and rhythm, normal S1 S2, no S3 or S4, no murmur, click or rub, no peripheral edema and peripheral pulses strong  ABDOMEN: soft, nontender, no hepatosplenomegaly, no masses and bowel sounds normal  MS: no musculoskeletal defects are noted and gait is age appropriate without ataxia  SKIN: no suspicious lesions or rashes  NEURO: Normal strength " "and tone, sensory exam grossly normal, mentation intact and speech normal  PSYCH: mentation appears normal and affect normal/bright      ASSESSMENT / PLAN:   1. Encounter for Medicare annual wellness exam    2. Seasonal affective disorder (H) - patient wants to start med.  Take from Oct-March.  Look into nara simulation therapy, can call for DME if needed  - sertraline (ZOLOFT) 25 MG tablet; Take 1 tablet (25 mg) by mouth daily  Dispense: 90 tablet; Refill: 3  - OFFICE/OUTPT VISIT,ELLIOTT SANCHEZ III    3. Need for prophylactic vaccination and inoculation against influenza  - INFLUENZA (HIGH DOSE) 3 VALENT VACCINE [27793]  - ADMIN INFLUENZA (For MEDICARE Patients ONLY) []    End of Life Planning:  Patient currently has an advanced directive: Yes.  Practitioner is supportive of decision.    COUNSELING:  Reviewed preventive health counseling, as reflected in patient instructions    Estimated body mass index is 24.12 kg/m  as calculated from the following:    Height as of 8/20/19: 1.702 m (5' 7\").    Weight as of this encounter: 69.9 kg (154 lb).         reports that she has never smoked. She has never used smokeless tobacco.      Appropriate preventive services were discussed with this patient, including applicable screening as appropriate for cardiovascular disease, diabetes, osteopenia/osteoporosis, and glaucoma.  As appropriate for age/gender, discussed screening for colorectal cancer, prostate cancer, breast cancer, and cervical cancer. Checklist reviewing preventive services available has been given to the patient.    Reviewed patients plan of care and provided an AVS. The Basic Care Plan (routine screening as documented in Health Maintenance) for Patty meets the Care Plan requirement. This Care Plan has been established and reviewed with the Patient.    Counseling Resources:  ATP IV Guidelines  Pooled Cohorts Equation Calculator  Breast Cancer Risk Calculator  FRAX Risk Assessment  ICSI Preventive " Guidelines  Dietary Guidelines for Americans, 2010  USDA's MyPlate  ASA Prophylaxis  Lung CA Screening    Jenny Guzman DO  [facility]    Identified Health Risks:    The patient was provided with written information regarding signs of hearing loss.

## 2019-09-17 NOTE — TELEPHONE ENCOUNTER
Pt has scanned in the chart a ADV, but no validation form,please review and act as necessary.  Route to the ADV team.  Macarena Mcclure CMA (JERRELL)   (aka: Lillian Mcclure)

## 2019-09-17 NOTE — PATIENT INSTRUCTIONS
Patient Education   Personalized Prevention Plan  You are due for the preventive services outlined below.  Your care team is available to assist you in scheduling these services.  If you have already completed any of these items, please share that information with your care team to update in your medical record.  Health Maintenance Due   Topic Date Due     Depression Action Plan  1945     Depression Assessment  01/12/2018     FALL RISK ASSESSMENT  08/29/2019     Flu Vaccine (1) 09/01/2019     Zoster (Shingles) Vaccine (3 of 3) 08/28/2019     Annual Wellness Visit  08/29/2019       Signs of Hearing Loss     Hearing much better with one ear can be a sign of hearing loss.     Hearing loss is a problem shared by many people. In fact, it is one of the most common health conditions, particularly as people age. Most people over age 65 have some hearing loss, and by age 80, almost everyone does. Because hearing loss usually occurs slowly over the years, you may not realize your hearing ability has gotten worse.  Have your hearing checked  Contact your healthcare provider if you:    Have to strain to hear normal conversation    Have to watch other people s faces very carefully to follow what they re saying    Need to ask people to repeat what they ve said    Often misunderstand what people are saying    Turn the volume of the television or radio up so high that others complain    Feel that people are mumbling when they re talking to you    Find that the effort to hear leaves you feeling tired and irritated    Notice, when using the phone, that you hear better with one ear than the other  Date Last Reviewed: 12/1/2016 2000-2018 [employer]. [address] [place] [address], [place] [address]. All rights reserved. This information is not intended as a substitute for professional medical care. Always follow your healthcare professional's instructions.              1. Look into Marilyn Simulation therapy/light.  2.  Start Sertraline/Zoloft 25 mg once daily.  Consider increase to 50 mg after 1 month if no side effects but is ineffective

## 2019-09-19 ENCOUNTER — TELEPHONE (OUTPATIENT)
Dept: INTERNAL MEDICINE | Facility: CLINIC | Age: 74
End: 2019-09-19

## 2019-09-19 NOTE — TELEPHONE ENCOUNTER
[facility] Patient Assessment Form signed and faxed back. Sent to scanning.  Ita Byrne on 9/19/2019 at 7:26 AM

## 2019-10-09 ENCOUNTER — HOSPITAL ENCOUNTER (OUTPATIENT)
Dept: MAMMOGRAPHY | Facility: CLINIC | Age: 74
Discharge: HOME | End: 2019-10-09
Attending: INTERNAL MEDICINE | Admitting: INTERNAL MEDICINE
Payer: COMMERCIAL

## 2019-10-09 DIAGNOSIS — Z12.31 VISIT FOR SCREENING MAMMOGRAM: ICD-10-CM

## 2019-10-09 PROCEDURE — 77063 BREAST TOMOSYNTHESIS BI: CPT

## 2019-10-23 ENCOUNTER — ALLIED HEALTH/NURSE VISIT (OUTPATIENT)
Dept: FAMILY MEDICINE | Facility: CLINIC | Age: 74
End: 2019-10-23
Payer: COMMERCIAL

## 2019-10-23 DIAGNOSIS — Z23 NEED FOR SHINGLES VACCINE: Primary | ICD-10-CM

## 2019-10-23 PROCEDURE — 90471 IMMUNIZATION ADMIN: CPT

## 2019-10-23 PROCEDURE — 99207 ZZC NO CHARGE NURSE ONLY: CPT

## 2019-10-31 ENCOUNTER — DOCUMENTATION ONLY (OUTPATIENT)
Dept: OTHER | Facility: CLINIC | Age: 74
End: 2019-10-31

## 2019-11-02 ENCOUNTER — HEALTH MAINTENANCE LETTER (OUTPATIENT)
Age: 74
End: 2019-11-02

## 2019-12-18 ENCOUNTER — OFFICE VISIT (OUTPATIENT)
Dept: FAMILY MEDICINE | Facility: CLINIC | Age: 74
End: 2019-12-18
Payer: COMMERCIAL

## 2019-12-18 VITALS
DIASTOLIC BLOOD PRESSURE: 78 MMHG | HEART RATE: 83 BPM | OXYGEN SATURATION: 99 % | RESPIRATION RATE: 14 BRPM | BODY MASS INDEX: 24.4 KG/M2 | WEIGHT: 155.8 LBS | TEMPERATURE: 97.6 F | SYSTOLIC BLOOD PRESSURE: 136 MMHG

## 2019-12-18 DIAGNOSIS — J06.9 VIRAL URI WITH COUGH: Primary | ICD-10-CM

## 2019-12-18 PROCEDURE — 99213 OFFICE O/P EST LOW 20 MIN: CPT | Performed by: INTERNAL MEDICINE

## 2019-12-18 RX ORDER — BENZONATATE 200 MG/1
200 CAPSULE ORAL 3 TIMES DAILY PRN
Qty: 21 CAPSULE | Refills: 0 | Status: SHIPPED | OUTPATIENT
Start: 2019-12-18 | End: 2020-06-08

## 2019-12-18 NOTE — PROGRESS NOTES
Subjective     Patty Zambrano is a 74 year old female who presents to clinic today for the following health issues:  Chief Complaint   Patient presents with     Cough     x 1 week       HPI   ENT Symptoms             Symptoms: cc Present Absent Comment   Fever/Chills   x Slight chills   Fatigue  x  Cough keeping patient up at night and not sleeping well.    Muscle Aches  x  Headache    Eye Irritation   x    Sneezing   x    Nasal Juan Jose/Drg  x     Sinus Pressure/Pain  x  Maxillary    Loss of smell   x    Dental pain   x    Sore Throat  x  Initially, now improving   Swollen Glands   x    Ear Pain/Fullness   x    Cough x x  Slightly productive, thick green   Wheeze  x  Slight, improves with coughing   Chest Pain   x    Shortness of breath   x    Rash   x    Other         Symptom duration:  x 8 days   Symptom severity:  moderate    Treatments tried:  Tylenol, Mucinex, Coricidin, Delsym   Contacts:  no known, volunteers at schools and [employer]         Patient Active Problem List   Diagnosis     Postmenopausal atrophic vaginitis     Sinusitis, chronic     Osteopenia     Hypovitaminosis D     HYPERLIPIDEMIA LDL GOAL <160     Cataract     Sigmoid diverticulitis     Dysthymia     Past Surgical History:   Procedure Laterality Date     COLONOSCOPY  4/3/2014    Procedure: COLONOSCOPY;  Colonoscopy;  Surgeon: Richie Kwon MD;  Location: [facility]     COLONOSCOPY N/A 12/15/2016    Procedure: COLONOSCOPY;  Surgeon: Richie Kwon MD;  Location: [facility]     EYE SURGERY  03/2017    laser     HC COLONOSCOPY THRU STOMA, DIAGNOSTIC  11/03    due 10 years     HC DILATION/CURETTAGE DIAG/THER NON OB       SURGICAL HISTORY OF -   05/05    right knee arthroscopy       Social History     Tobacco Use     Smoking status: Never Smoker     Smokeless tobacco: Never Used   Substance Use Topics     Alcohol use: Yes     Comment: rare     Family History   Problem Relation Age of Onset     Hypertension Mother      Osteoporosis Mother      Cancer Maternal  Grandmother         abdominal CA     Heart Disease Maternal Grandfather      Alcohol/Drug Paternal Grandfather      Cancer Brother         sarcoma     Arthritis Father      Alzheimer Disease Father          Current Outpatient Medications   Medication Sig Dispense Refill     ASPIRIN 81 MG OR TABS 1 TABLET DAILY       benzonatate (TESSALON) 200 MG capsule Take 1 capsule (200 mg) by mouth 3 times daily as needed for cough 21 capsule 0     CALCIUM + D 600-200 MG-UNIT OR TABS 2 TABLET DAILY       Cholecalciferol (VITAMIN D) 2000 UNITS tablet Take 2,000 Units by mouth daily 100 tablet 3     cycloSPORINE (RESTASIS) 0.05 % ophthalmic emulsion Place 1 drop into both eyes 2 times daily       FISH OIL 1,500 mg. Daily       GLUCOSAMINE CHONDRO COMPLEX 500-400 MG OR TABS 3 tablets by mouth daily       MELATONIN PO Take 3 mg by mouth At Bedtime        METAMUCIL OR Take 1 teaspoonful once daily       minoxidil (ROGAINE) 5 % external solution Apply topically 2 times daily 2%       MULTIVITAMIN OR Take 1 tablet by mouth daily       Probiotic Product (PROBIOTIC ACIDOPHILUS) CAPS Take 1 capsule by mouth daily        sertraline (ZOLOFT) 25 MG tablet Take 1 tablet (25 mg) by mouth daily 90 tablet 3     Allergies   Allergen Reactions     Augmentin Diarrhea       Reviewed and updated as needed this visit by Provider         Review of Systems   ROS COMP: Constitutional, HEENT, pulmonary systems are negative, except as otherwise noted.      Objective    /78 (BP Location: Right arm, Patient Position: Sitting, Cuff Size: Adult Regular)   Pulse 83   Temp 97.6  F (36.4  C) (Tympanic)   Resp 14   Wt 70.7 kg (155 lb 12.8 oz)   SpO2 99%   BMI 24.40 kg/m    Body mass index is 24.4 kg/m .  Physical Exam     GENERAL: alert and no distress, occasional coughing  EYES: Eyes grossly normal to inspection, PERRL and conjunctivae and sclerae normal  HENT: ear canals and TMs normal, sinuses non tender to percussion, nose and mouth without ulcers  or lesions, oropharynx red but no tonsillar exudates  NECK: no adenopathy, no asymmetry, masses, or scars  RESP: lungs clear to auscultation - no rales, rhonchi or wheezes  CV: regular rate and rhythm, normal S1 S2, no S3 or S4, no murmur, click or rub           Assessment & Plan     1. Viral URI with cough    Patty presents with 8 days of cough.  Her vitals are normal and her lungs are clear, so pneumonia seems unlikely.  Symptoms seem most likely to be viral in etiology.  She does report that they are somewhat better today compared to yesterday.  Continue symptomatic management, we will try Tessalon as she feels that this was helpful in the past.  Call with any worsening symptoms.    - benzonatate (TESSALON) 200 MG capsule; Take 1 capsule (200 mg) by mouth 3 times daily as needed for cough  Dispense: 21 capsule; Refill: 0      Return in about 1 week (around 12/25/2019), or if symptoms worsen or fail to improve.    Silas Cortez MD  [facility]

## 2019-12-18 NOTE — PATIENT INSTRUCTIONS
Call if the sinus symptoms are worsening or if you develop fevers, worsening cough, or shortness of breath.

## 2020-06-03 ENCOUNTER — TRANSFERRED RECORDS (OUTPATIENT)
Dept: HEALTH INFORMATION MANAGEMENT | Facility: CLINIC | Age: 75
End: 2020-06-03

## 2020-06-08 ENCOUNTER — OFFICE VISIT (OUTPATIENT)
Dept: FAMILY MEDICINE | Facility: CLINIC | Age: 75
End: 2020-06-08
Payer: COMMERCIAL

## 2020-06-08 VITALS
TEMPERATURE: 98.2 F | HEIGHT: 67 IN | OXYGEN SATURATION: 98 % | BODY MASS INDEX: 24.01 KG/M2 | RESPIRATION RATE: 18 BRPM | WEIGHT: 153 LBS | SYSTOLIC BLOOD PRESSURE: 128 MMHG | DIASTOLIC BLOOD PRESSURE: 60 MMHG | HEART RATE: 98 BPM

## 2020-06-08 DIAGNOSIS — Z01.818 PREOP GENERAL PHYSICAL EXAM: Primary | ICD-10-CM

## 2020-06-08 DIAGNOSIS — H33.321 RETINAL HOLE OF RIGHT EYE: ICD-10-CM

## 2020-06-08 PROCEDURE — 99214 OFFICE O/P EST MOD 30 MIN: CPT | Performed by: FAMILY MEDICINE

## 2020-06-08 PROCEDURE — 93000 ELECTROCARDIOGRAM COMPLETE: CPT | Performed by: FAMILY MEDICINE

## 2020-06-08 ASSESSMENT — PATIENT HEALTH QUESTIONNAIRE - PHQ9: SUM OF ALL RESPONSES TO PHQ QUESTIONS 1-9: 1

## 2020-06-08 ASSESSMENT — MIFFLIN-ST. JEOR: SCORE: 1231.63

## 2020-06-08 NOTE — PROGRESS NOTES
[facility]  [address] [address]  [phone]  Dept: [phone]    PRE-OP EVALUATION:  Today's date: 2020    Patty Zambrano (: 1945) presents for pre-operative evaluation assessment as requested by Dr. Eulogio Machado .  She requires evaluation and anesthesia risk assessment prior to undergoing surgery/procedure for treatment of right eye retinal hole .    Proposed Surgery/ Procedure: Vitrectomy   Date of Surgery/ Procedure: 20  Time of Surgery/ Procedure: 1:15 PM   Hospital/Surgical Facility: [facility]   Fax number for surgical facility: [phone]  Primary Physician: Jenny Guzman  Type of Anesthesia Anticipated: General    Patient has a Health Care Directive or Living Will:  YES     1. NO - Do you have a history of heart attack, stroke, stent, bypass or surgery on an artery in the head, neck, heart or legs?  2. NO - Do you ever have any pain or discomfort in your chest?  3. NO - Do you have a history of  Heart Failure?  4. NO - Are you troubled by shortness of breath when: walking on the level, up a slight hill or at night?  5. NO - Do you currently have a cold, bronchitis or other respiratory infection?  6. NO - Do you have a cough, shortness of breath or wheezing?  7. NO - Do you sometimes get pains in the calves of your legs when you walk?  8. NO - Do you or anyone in your family have previous history of blood clots?  9. NO - Do you or does anyone in your family have a serious bleeding problem such as prolonged bleeding following surgeries or cuts?  10. NO - Have you ever had problems with anemia or been told to take iron pills?  11. NO - Have you had any abnormal blood loss such as black, tarry or bloody stools, or abnormal vaginal bleeding?  12. NO - Have you ever had a blood transfusion?  13. NO - Have you or any of your relatives ever had problems with anesthesia?  14. NO - Do you have sleep apnea, excessive snoring or  daytime drowsiness?  15. NO - Do you have any prosthetic heart valves?  16. NO - Do you have prosthetic joints?  17. NO - Is there any chance that you may be pregnant?      HPI:     HPI related to upcoming procedure:  Developed a retinal hole and is scheduled for vitrectomy.      She had had bilateral cataract surgery in the past and bilateral YAG surgery a year after that.        See problem list for active medical problems.  Problems all longstanding and stable, except as noted/documented.  See ROS for pertinent symptoms related to these conditions.      MEDICAL HISTORY:     Patient Active Problem List    Diagnosis Date Noted     Dysthymia 07/12/2017     Priority: Medium     Sigmoid diverticulitis 08/26/2016     Priority: Medium     Episode in 2006 or so, then 2015 and 2016, 12/2017.       Cataract 12/29/2015     Priority: Medium     HYPERLIPIDEMIA LDL GOAL <160 10/31/2010     Priority: Medium     Hypovitaminosis D 04/07/2010     Priority: Medium     Osteopenia 03/19/2009     Priority: Medium     Sinusitis, chronic 02/18/2008     Priority: Medium     MRI 2003 positive for sinus disease.  Problem list name updated by automated process. Provider to review       Postmenopausal atrophic vaginitis 01/15/2007     Priority: Medium      Past Medical History:   Diagnosis Date     Abnormal Papanicolaou smear of cervix and cervical HPV 1978    cryotherapy     Brachial neuritis or radiculitis NOS     thoracic outlet syndrome, left sided     Diverticulitis of colon (without mention of hemorrhage)(562.11) 05/04    presumptive     Postmenopausal atrophic vaginitis      Raynaud's syndrome      Past Surgical History:   Procedure Laterality Date     COLONOSCOPY  4/3/2014    Procedure: COLONOSCOPY;  Colonoscopy;  Surgeon: Richie Kwon MD;  Location: [facility]     COLONOSCOPY N/A 12/15/2016    Procedure: COLONOSCOPY;  Surgeon: Richie Kwon MD;  Location: [facility]     EYE SURGERY  03/2017    laser     HC COLONOSCOPY THRU STOMA,  "DIAGNOSTIC  11/03    due 10 years     HC DILATION/CURETTAGE DIAG/THER NON OB       SURGICAL HISTORY OF -   05/05    right knee arthroscopy     Current Outpatient Medications   Medication Sig Dispense Refill     ASPIRIN 81 MG OR TABS 1 TABLET DAILY       CALCIUM + D 600-200 MG-UNIT OR TABS 2 TABLET DAILY       Cholecalciferol (VITAMIN D) 2000 UNITS tablet Take 2,000 Units by mouth daily 100 tablet 3     cycloSPORINE (RESTASIS) 0.05 % ophthalmic emulsion Place 1 drop into both eyes 2 times daily       FISH OIL 1,500 mg. Daily       GLUCOSAMINE CHONDRO COMPLEX 500-400 MG OR TABS 3 tablets by mouth daily       MELATONIN PO Take 3 mg by mouth At Bedtime        METAMUCIL OR Take 1 teaspoonful once daily       minoxidil (ROGAINE) 5 % external solution Apply topically 2 times daily 2%       MULTIVITAMIN OR Take 1 tablet by mouth daily       Probiotic Product (PROBIOTIC ACIDOPHILUS) CAPS Take 1 capsule by mouth daily        sertraline (ZOLOFT) 25 MG tablet Take 1 tablet (25 mg) by mouth daily 90 tablet 3     OTC products: None, except as noted above    Allergies   Allergen Reactions     Augmentin Diarrhea      Latex Allergy: NO    Social History     Tobacco Use     Smoking status: Never Smoker     Smokeless tobacco: Never Used   Substance Use Topics     Alcohol use: Yes     Comment: rare     History   Drug Use No       REVIEW OF SYSTEMS:   CONSTITUTIONAL: NEGATIVE for fever, chills, change in weight  ENT/MOUTH: NEGATIVE for ear, mouth and throat problems  RESP: NEGATIVE for significant cough or SOB  CV: NEGATIVE for chest pain, palpitations or peripheral edema  ABDOMEN:  Some abdominal pain over the weekend, low grade temp.      EXAM:   /60   Pulse 98   Temp 98.2  F (36.8  C)   Resp 18   Ht 1.702 m (5' 7\")   Wt 69.4 kg (153 lb)   SpO2 98%   BMI 23.96 kg/m    GENERAL APPEARANCE: healthy, alert and no distress  HENT: ear canals and TM's normal and nose and mouth without ulcers or lesions  RESP: lungs clear to " auscultation - no rales, rhonchi or wheezes  CV: regular rate and rhythm, normal S1 S2, no S3 or S4 and no murmur, click or rub   ABDOMEN: soft, nontender, no HSM or masses and bowel sounds normal  NEURO: Normal strength and tone, sensory exam grossly normal, mentation intact and speech normal    DIAGNOSTICS:   EKG: appears normal, NSR, normal axis, normal intervals, no acute ST/T changes c/w ischemia, no LVH by voltage criteria, there are no prior tracings available    Recent Labs   Lab Test 09/04/18  0713 06/06/18  1130 08/30/16  0200   HGB  --  14.1 13.6   PLT  --  282 226     --  138   POTASSIUM 4.9  --  4.0   CR 0.84  --  1.08*        IMPRESSION:   Reason for surgery/procedure: retinal hole, right eye  Diagnosis/reason for consult: preoperative evaluation and medical risk assessment    The proposed surgical procedure is considered LOW risk.    REVISED CARDIAC RISK INDEX  The patient has the following serious cardiovascular risks for perioperative complications such as (MI, PE, VFib and 3  AV Block):  No serious cardiac risks  INTERPRETATION: 0 risks: Class I (very low risk - 0.4% complication rate)    The patient has the following additional risks for perioperative complications:  No identified additional risks      ICD-10-CM    1. Preop general physical exam  Z01.818 EKG 12-lead complete w/read - Clinics   2. Retinal hole of right eye  H33.321 EKG 12-lead complete w/read - Clinics       RECOMMENDATIONS:         --Patient is to take all scheduled medications on the day of surgery    APPROVAL GIVEN to proceed with proposed procedure, without further diagnostic evaluation       Signed Electronically by: Shreya Rizzo MD    Copy of this evaluation report is provided to requesting physician.    [facility] Preop Guidelines    Revised Cardiac Risk Index

## 2020-07-17 ENCOUNTER — TRANSFERRED RECORDS (OUTPATIENT)
Dept: HEALTH INFORMATION MANAGEMENT | Facility: CLINIC | Age: 75
End: 2020-07-17

## 2020-07-24 ENCOUNTER — OFFICE VISIT (OUTPATIENT)
Dept: FAMILY MEDICINE | Facility: CLINIC | Age: 75
End: 2020-07-24
Payer: COMMERCIAL

## 2020-07-24 VITALS
WEIGHT: 153 LBS | TEMPERATURE: 97.5 F | DIASTOLIC BLOOD PRESSURE: 64 MMHG | BODY MASS INDEX: 24.01 KG/M2 | HEIGHT: 67 IN | HEART RATE: 86 BPM | RESPIRATION RATE: 14 BRPM | SYSTOLIC BLOOD PRESSURE: 118 MMHG | OXYGEN SATURATION: 97 %
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
## 2020-07-24 PROCEDURE — 36415 COLL VENOUS BLD VENIPUNCTURE: CPT | Performed by: NURSE PRACTITIONER

## 2020-07-24 PROCEDURE — 86618 LYME DISEASE ANTIBODY: CPT | Performed by: NURSE PRACTITIONER

## 2020-07-24 PROCEDURE — 99213 OFFICE O/P EST LOW 20 MIN: CPT | Performed by: NURSE PRACTITIONER

## 2020-07-24 ASSESSMENT — ENCOUNTER SYMPTOMS
MYALGIAS: 1
FEVER: 0
CHILLS: 0
CONSTITUTIONAL NEGATIVE: 1

## 2020-07-24 ASSESSMENT — MIFFLIN-ST. JEOR: SCORE: 1231.63

## 2020-07-24 NOTE — PROGRESS NOTES
Subjective     Patty Zambrano is a 75 year old female who presents to clinic today for the following health issues:    HPI     Chief Complaint   Patient presents with     Lyme Disease     Pt reports Lymes disease symptoms.     Tick Bite  Onset: 06/27/20    Description:   Behind left knee, pt reports the tick was already embedded when she found. Pt reports she is having body and joint aches. Pt reports pain is worse in her left knee. Pt reports pains get worse at night.     Intensity: mild- moderate     Progression of Symptoms:  worsening    Accompanying Signs & Symptoms:  No     Previous history of similar problem:   No     Precipitating factors:   Worsened by: no     Alleviating factors:  Improved by: no     Therapies Tried and outcome: no     Additional provider notes: Patient reports she found tick embedded behind left knee for as long as possibly 4 days. Tick was removed 06/27/20. Did not develop a rash behind knee. Has had off and on symptoms recently of body aches. Last night woke up with body aches. Generalized LLE pain/aches have been more consistent than other body aches. Hx of arthritis, so patient is unsure if it is related. Wants blood work to r/o Lyme disease. States her  had Lyme disease and she wants to be treated if she does have it.      Patient Active Problem List   Diagnosis     Postmenopausal atrophic vaginitis     Sinusitis, chronic     Osteopenia     Hypovitaminosis D     HYPERLIPIDEMIA LDL GOAL <160     Cataract     Sigmoid diverticulitis     Dysthymia     Past Surgical History:   Procedure Laterality Date     COLONOSCOPY  4/3/2014    Procedure: COLONOSCOPY;  Colonoscopy;  Surgeon: Richie Kwon MD;  Location: [facility]     COLONOSCOPY N/A 12/15/2016    Procedure: COLONOSCOPY;  Surgeon: Richie Kwon MD;  Location: [facility]     EYE SURGERY  03/2017    laser     HC COLONOSCOPY THRU STOMA, DIAGNOSTIC  11/03    due 10 years     HC DILATION/CURETTAGE DIAG/THER NON OB       SURGICAL HISTORY OF -    05/05    right knee arthroscopy       Social History     Tobacco Use     Smoking status: Never Smoker     Smokeless tobacco: Never Used   Substance Use Topics     Alcohol use: Yes     Comment: rare     Family History   Problem Relation Age of Onset     Hypertension Mother      Osteoporosis Mother      Cancer Maternal Grandmother         abdominal CA     Heart Disease Maternal Grandfather      Alcohol/Drug Paternal Grandfather      Cancer Brother         sarcoma     Arthritis Father      Alzheimer Disease Father          Current Outpatient Medications   Medication Sig Dispense Refill     ASPIRIN 81 MG OR TABS 1 TABLET DAILY       CALCIUM + D 600-200 MG-UNIT OR TABS 2 TABLET DAILY       Cholecalciferol (VITAMIN D) 2000 UNITS tablet Take 2,000 Units by mouth daily 100 tablet 3     cycloSPORINE (RESTASIS) 0.05 % ophthalmic emulsion Place 1 drop into both eyes 2 times daily       FISH OIL 1,500 mg. Daily       GLUCOSAMINE CHONDRO COMPLEX 500-400 MG OR TABS 3 tablets by mouth daily       MELATONIN PO Take 3 mg by mouth At Bedtime        METAMUCIL OR Take 1 teaspoonful once daily       minoxidil (ROGAINE) 5 % external solution Apply topically 2 times daily 2%       MULTIVITAMIN OR Take 1 tablet by mouth daily       Probiotic Product (PROBIOTIC ACIDOPHILUS) CAPS Take 1 capsule by mouth daily        sertraline (ZOLOFT) 25 MG tablet Take 1 tablet (25 mg) by mouth daily 90 tablet 3     Allergies   Allergen Reactions     Augmentin Diarrhea     BP Readings from Last 3 Encounters:   07/24/20 118/64   06/08/20 128/60   12/18/19 136/78    Wt Readings from Last 3 Encounters:   07/24/20 69.4 kg (153 lb)   06/08/20 69.4 kg (153 lb)   12/18/19 70.7 kg (155 lb 12.8 oz)                    Reviewed and updated as needed this visit by Provider  Tobacco  Allergies  Meds  Problems  Med Hx  Surg Hx  Fam Hx         Review of Systems   Constitutional: Negative.  Negative for chills and fever.   Musculoskeletal: Positive for myalgias  "(generalized ).   Skin: Negative for rash.            Objective    /64   Pulse 86   Temp 97.5  F (36.4  C) (Tympanic)   Resp 14   Ht 1.702 m (5' 7\")   Wt 69.4 kg (153 lb)   SpO2 97%   BMI 23.96 kg/m    Body mass index is 23.96 kg/m .  Physical Exam  Vitals signs and nursing note reviewed.   Constitutional:       General: She is not in acute distress.     Appearance: Normal appearance. She is not ill-appearing or toxic-appearing.   Skin:     General: Skin is warm and dry.      Findings: No erythema, lesion, rash or wound.   Neurological:      Mental Status: She is alert.            Diagnostic Test Results:  Labs reviewed in Epic        Assessment & Plan     1. Tick bite, initial encounter  No rash or wound where patient reports tick bite was. No sign of infection. Blood work ordered to r/o Lyme disease. Discussed treatment options and patient wants to wait until results come back before initiating treatment.     - Lyme Disease Portia with reflex to WB Serum       See Patient Instructions    Return if symptoms worsen or fail to improve.    [facility]  "

## 2020-07-27 LAB — B BURGDOR IGG+IGM SER QL: 0.11 (ref 0–0.89)

## 2020-08-18 ENCOUNTER — TRANSFERRED RECORDS (OUTPATIENT)
Dept: HEALTH INFORMATION MANAGEMENT | Facility: CLINIC | Age: 75
End: 2020-08-18

## 2020-08-18 LAB — HEMOCCULT STL QL IA: NEGATIVE

## 2020-08-26 ENCOUNTER — TRANSFERRED RECORDS (OUTPATIENT)
Dept: HEALTH INFORMATION MANAGEMENT | Facility: CLINIC | Age: 75
End: 2020-08-26

## 2020-09-18 ENCOUNTER — TRANSFERRED RECORDS (OUTPATIENT)
Dept: HEALTH INFORMATION MANAGEMENT | Facility: CLINIC | Age: 75
End: 2020-09-18

## 2020-09-18 ENCOUNTER — HOSPITAL ENCOUNTER (OUTPATIENT)
Dept: PHYSICAL THERAPY | Facility: CLINIC | Age: 75
Setting detail: THERAPIES SERIES
End: 2020-09-18
Attending: NURSE PRACTITIONER
Payer: COMMERCIAL

## 2020-09-18 PROCEDURE — 97162 PT EVAL MOD COMPLEX 30 MIN: CPT | Mod: GP | Performed by: PHYSICAL THERAPIST

## 2020-09-18 NOTE — PROGRESS NOTES
09/18/20 1000   General Information   Type of Visit Initial OP Ortho PT Evaluation   Start of Care Date 09/18/20   Referring Physician Wiliam Serra NP   Patient/Family Goals Statement To regain my normal level of activity w/o pain   Orders Evaluate and Treat   Orders Comment stretching/ strengthening/ stabilization   Date of Order 09/18/20   Certification Required? No   Medical Diagnosis back pain   Body Part(s)   Body Part(s) Lumbar Spine/SI   Presentation and Etiology   Pertinent history of current problem (include personal factors and/or comorbidities that impact the POC) Pt presents w/ L buttock pain which radiates down L LE to mid lower leg.  Pain intermittent 7/10.  Pt states symptoms started June --gradual onset.   Pt notes intermittent tingling in L foot which comes on w/ the hip pain.   Neg cough/ sneeze/ LE weakness or giving way.  Neg bowel / bladder.   Xray :  mild hip DJD,  marked DDD.   Meds:  meloxicam ( will be increasing to 2X/day,  /tylenol.   No injections.   Pt will be having MRI.   PMHX:  depression  (on meds), arthritis,   R knee scope.  Mod:  pt's activities   Impairments A. Pain;F. Decreased strength and endurance;H. Impaired gait;L. Tingling   Pain quality B. Dull;C. Aching   Pain exacerbation comment Walking tolerance varies--is walking 1 to 1.5 miles,  Standing > 5 min. some yoga moves.  Difficulty getting comfortable to get to sleep 4 nights/ week.   Gardening   Pain/symptoms eased by A. Sitting;C. Rest;E. Changing positions;G. Heat;I. OTC medication(s)   Progression of symptoms since onset: Worsened  (more frequent episodes of pain)   Prior Level of Function   Functional Level Prior Comment walks 3-4x/wk 1 to 1.5 miles.  Golfs 2 X/wk, gardening, hiking   Current Level of Function   Patient role/employment history F. Retired   Fall Risk Screen   Fall screen completed by PT   Have you fallen 2 or more times in the past year? No   Have you fallen and had an injury in the past year? No    Is patient a fall risk? No   Abuse Screen (yes response referral indicated)   Feels Unsafe at Home or Work/School no   Feels Threatened by Someone no   Does Anyone Try to Keep You From Having Contact with Others or Doing Things Outside Your Home? no   Lumbar Spine/SI Objective Findings   Posture Decreased lumbar lordosis.  R PSIS/ crest lower (standing)  Seated R side slightly lower.  L LE very slightly longer.    Gait/Locomotion guarded w/ gait.  Toe walk negative,  Pt noted pain w/ heel walk   Flexion ROM WNL   Extension ROM 25% notes L sided symptoms   Right Side Bending ROM WNL   Left Side Bending ROM WNL notes tingling in L foot   Pelvic Screen R FB test +   Hip Screen PROM ER R 32*, L 35*;  IR R 23*, L 24*.  Scour R neg, L + ,  FADIR neg B.  RIKY R neg, L tightness noted   Hip Flexion (L2) Strength 4/5   Hip Abduction Strength R 5/5, L 4/5   Knee Flexion Strength 5/5 B   Knee Extension (L3) Strength 5/5 B   Ankle Dorsiflexion (L4) Strength 5/5 B   Great Toe Extension (L5) Strength R 5/5, L 4/5   Hamstring Flexibility slight tightness B   Hip Flexor Flexibility WNL B   Obers Flexibility R mild to mod tightness, L mod to mod + tightness   SLR neg B   Howard Test Testing R side bothered L hip, L negative   Crossover SLR neg B    Slump Test neg B   Segmental Mobility ERSR L5   Palpation Mild tenderness L piriformis/ ITB.    Planned Therapy Interventions   Planned Therapy Interventions manual therapy;neuromuscular re-education;ROM;strengthening;stretching   Clinical Impression   Criteria for Skilled Therapeutic Interventions Met yes, treatment indicated   PT Diagnosis back pain w/ L radicular symptoms   Influenced by the following impairments pain, tingling, decreased flexibility   Functional limitations due to impairments walking, standing, sleeping, gardening   Clinical Presentation Evolving/Changing   Clinical Presentation Rationale symptoms are increasing   Clinical Decision Making (Complexity) Moderate  complexity   Therapy Frequency 1 time/week   Predicted Duration of Therapy Intervention (days/wks) 6 weeks   Risk & Benefits of therapy have been explained Yes   Patient, Family & other staff in agreement with plan of care Yes   Education Assessment   Barriers to Learning No barriers   Ortho Goal 1   Goal Identifier 1.   Goal Description Pt will be able to walk 1.5 miles regularly w/ pain no > 3/10   Target Date 11/07/20   Ortho Goal 2   Goal Identifier 2.   Goal Description Pt will be able to stand 15 min w/ ADL's w/pain no > 3/10   Target Date 11/07/20   Ortho Goal 3   Goal Identifier 3.    Goal Description Pt will report increased ease getting comfortable to sleep   Target Date 11/07/20   Ortho Goal 4   Goal Identifier 4.   Goal Description Pt will be independent and consistent w/HEP   Target Date 11/07/20   Total Evaluation Time   PT Eval, Moderate Complexity Minutes (72176) 30     Thank you for this referral,    Yolis Guzman, PT,  CEAS   #[id number]  [facility]  [phone]

## 2020-09-21 ASSESSMENT — ENCOUNTER SYMPTOMS
ARTHRALGIAS: 1
DYSURIA: 0
PALPITATIONS: 0
NERVOUS/ANXIOUS: 0
SHORTNESS OF BREATH: 0
DIARRHEA: 0
HEARTBURN: 0
CHILLS: 0
HEADACHES: 0
FREQUENCY: 0
ABDOMINAL PAIN: 0
DIZZINESS: 0
SORE THROAT: 0
PARESTHESIAS: 0
COUGH: 0
MYALGIAS: 1
NAUSEA: 0
WEAKNESS: 0
BREAST MASS: 0
FEVER: 0
HEMATURIA: 0
EYE PAIN: 0
JOINT SWELLING: 0
CONSTIPATION: 0
HEMATOCHEZIA: 0

## 2020-09-21 ASSESSMENT — ACTIVITIES OF DAILY LIVING (ADL): CURRENT_FUNCTION: NO ASSISTANCE NEEDED

## 2020-09-22 ENCOUNTER — OFFICE VISIT (OUTPATIENT)
Dept: FAMILY MEDICINE | Facility: CLINIC | Age: 75
End: 2020-09-22
Payer: COMMERCIAL

## 2020-09-22 VITALS
WEIGHT: 153 LBS | DIASTOLIC BLOOD PRESSURE: 72 MMHG | TEMPERATURE: 97.9 F | HEIGHT: 67 IN | HEART RATE: 102 BPM | OXYGEN SATURATION: 98 % | SYSTOLIC BLOOD PRESSURE: 120 MMHG | RESPIRATION RATE: 16 BRPM | BODY MASS INDEX: 24.01 KG/M2

## 2020-09-22 DIAGNOSIS — F33.8 SEASONAL AFFECTIVE DISORDER (H): ICD-10-CM

## 2020-09-22 DIAGNOSIS — N18.30 CKD (CHRONIC KIDNEY DISEASE) STAGE 3, GFR 30-59 ML/MIN (H): ICD-10-CM

## 2020-09-22 DIAGNOSIS — Z23 NEED FOR PROPHYLACTIC VACCINATION AND INOCULATION AGAINST INFLUENZA: ICD-10-CM

## 2020-09-22 DIAGNOSIS — Z00.00 ENCOUNTER FOR MEDICARE ANNUAL WELLNESS EXAM: Primary | ICD-10-CM

## 2020-09-22 DIAGNOSIS — S40.021S ARM CONTUSION, RIGHT, SEQUELA: ICD-10-CM

## 2020-09-22 LAB
ANION GAP SERPL CALCULATED.3IONS-SCNC: 2 MMOL/L (ref 3–14)
BUN SERPL-MCNC: 24 MG/DL (ref 7–30)
CALCIUM SERPL-MCNC: 9.4 MG/DL (ref 8.5–10.1)
CHLORIDE SERPL-SCNC: 107 MMOL/L (ref 94–109)
CO2 SERPL-SCNC: 30 MMOL/L (ref 20–32)
CREAT SERPL-MCNC: 0.77 MG/DL (ref 0.52–1.04)
GFR SERPL CREATININE-BSD FRML MDRD: 76 ML/MIN/{1.73_M2}
GLUCOSE SERPL-MCNC: 69 MG/DL (ref 70–99)
POTASSIUM SERPL-SCNC: 4.6 MMOL/L (ref 3.4–5.3)
SODIUM SERPL-SCNC: 139 MMOL/L (ref 133–144)

## 2020-09-22 PROCEDURE — G0008 ADMIN INFLUENZA VIRUS VAC: HCPCS | Performed by: INTERNAL MEDICINE

## 2020-09-22 PROCEDURE — 80048 BASIC METABOLIC PNL TOTAL CA: CPT | Performed by: INTERNAL MEDICINE

## 2020-09-22 PROCEDURE — 90662 IIV NO PRSV INCREASED AG IM: CPT | Performed by: INTERNAL MEDICINE

## 2020-09-22 PROCEDURE — 99397 PER PM REEVAL EST PAT 65+ YR: CPT | Mod: 25 | Performed by: INTERNAL MEDICINE

## 2020-09-22 PROCEDURE — 99213 OFFICE O/P EST LOW 20 MIN: CPT | Mod: 25 | Performed by: INTERNAL MEDICINE

## 2020-09-22 PROCEDURE — 36415 COLL VENOUS BLD VENIPUNCTURE: CPT | Performed by: INTERNAL MEDICINE

## 2020-09-22 RX ORDER — MELOXICAM 7.5 MG/1
7.5 TABLET ORAL 2 TIMES DAILY
COMMUNITY
Start: 2020-08-26 | End: 2021-10-08

## 2020-09-22 RX ORDER — SERTRALINE HYDROCHLORIDE 25 MG/1
25 TABLET, FILM COATED ORAL DAILY
Qty: 90 TABLET | Refills: 3 | Status: SHIPPED | OUTPATIENT
Start: 2020-09-22 | End: 2021-10-08

## 2020-09-22 ASSESSMENT — ENCOUNTER SYMPTOMS
MYALGIAS: 1
PALPITATIONS: 0
JOINT SWELLING: 0
HEADACHES: 0
NERVOUS/ANXIOUS: 0
HEMATURIA: 0
EYE PAIN: 0
NAUSEA: 0
FREQUENCY: 0
PARESTHESIAS: 0
HEARTBURN: 0
DIARRHEA: 0
HEMATOCHEZIA: 0
CHILLS: 0
DIZZINESS: 0
FEVER: 0
SORE THROAT: 0
DYSURIA: 0
BREAST MASS: 0
CONSTIPATION: 0
WEAKNESS: 0
ARTHRALGIAS: 1
ABDOMINAL PAIN: 0
SHORTNESS OF BREATH: 0
COUGH: 0

## 2020-09-22 ASSESSMENT — ACTIVITIES OF DAILY LIVING (ADL): CURRENT_FUNCTION: NO ASSISTANCE NEEDED

## 2020-09-22 ASSESSMENT — MIFFLIN-ST. JEOR: SCORE: 1236.75

## 2020-09-22 NOTE — LETTER
My Depression Action Plan  Name: Patty Zambrano   Date of Birth 1945  Date: 9/22/2020    My doctor: Jenny Guzman   My clinic: [facility]  [address] [id number] [phone]          GREEN    ZONE   Good Control    What it looks like:     Things are going generally well. You have normal ups and downs. You may even feel depressed from time to time, but bad moods usually last less than a day.   What you need to do:  1. Continue to care for yourself (see self care plan)  2. Check your depression survival kit and update it as needed  3. Follow your physician s recommendations including any medication.  4. Do not stop taking medication unless you consult with your physician first.           YELLOW         ZONE Getting Worse    What it looks like:     Depression is starting to interfere with your life.     It may be hard to get out of bed; you may be starting to isolate yourself from others.    Symptoms of depression are starting to last most all day and this has happened for several days.     You may have suicidal thoughts but they are not constant.   What you need to do:     1. Call your care team. Your response to treatment will improve if you keep your care team informed of your progress. Yellow periods are signs an adjustment may need to be made.     2. Continue your self-care.  Just get dressed and ready for the day.  Don't give yourself time to talk yourself out of it.    3. Talk to someone in your support network.    4. Open up your Depression Self-Care Plan/Wellness Kit.           RED    ZONE Medical Alert - Get Help    What it looks like:     Depression is seriously interfering with your life.     You may experience these or other symptoms: You can t get out of bed most days, can t work or engage in other necessary activities, you have trouble taking care of basic hygiene, or basic responsibilities, thoughts of suicide or death that will not go away,  self-injurious behavior.     What you need to do:  1. Call your care team and request a same-day appointment. If they are not available (weekends or after hours) call your local crisis line, emergency room or 911.            Depression Self-Care Plan / Wellness Kit    Self-Care for Depression  Here s the deal. Your body and mind are really not as separate as most people think.  What you do and think affects how you feel and how you feel influences what you do and think. This means if you do things that people who feel good do, it will help you feel better.  Sometimes this is all it takes.  There is also a place for medication and therapy depending on how severe your depression is, so be sure to consult with your medical provider and/ or Behavioral Health Consultant if your symptoms are worsening or not improving.     In order to better manage my stress, I will:    Exercise  Get some form of exercise, every day. This will help reduce pain and release endorphins, the  feel good  chemicals in your brain. This is almost as good as taking antidepressants!  This is not the same as joining a gym and then never going! (they count on that by the way ) It can be as simple as just going for a walk or doing some gardening, anything that will get you moving.      Hygiene   Maintain good hygiene (get out of bed in the morning, make your bed, brush your teeth, take a shower, and get dressed like you were going to work, even if you are unemployed).  If your clothes don't fit try to get ones that do.    Diet  Strive to eat foods that are good for me, drink plenty of water, and avoid excessive sugar, caffeine, alcohol, and other mood-altering substances.  Some foods that are helpful in depression are: complex carbohydrates, B vitamins, flaxseed, fish or fish oil, fresh fruits and vegetables.    Psychotherapy  Agree to participate in Individual Therapy (if recommended).    Medication  If prescribed medications, I agree to take them.   Missing doses can result in serious side effects.  I understand that drinking alcohol, or other illicit drug use, may cause potential side effects.  I will not stop my medication abruptly without first discussing it with my provider.    Staying Connected With Others  Stay in touch with my friends, family members, and my primary care provider/team.    Use your imagination  Be creative.  We all have a creative side; it doesn t matter if it s oil painting, sand castles, or mud pies! This will also kick up the endorphins.    Witness Beauty  (AKA stop and smell the roses) Take a look outside, even in mid-winter. Notice colors, textures. Watch the squirrels and birds.     Service to others  Be of service to others.  There is always someone else in need.  By helping others we can  get out of ourselves  and remember the really important things.  This also provides opportunities for practicing all the other parts of the program.    Humor  Laugh and be silly!  Adjust your TV habits for less news and crime-drama and more comedy.    Control your stress  Try breathing deep, massage therapy, biofeedback, and meditation. Find time to relax each day.     Crisis Text Line  http://www.crisistextline.org    The Crisis Text Line serves anyone, in any type of crisis, providing access to free, 24/7 support and information via the medium people already use and trust:    Here's how it works:  1.  Text 480-178 from anywhere in the [place], anytime, about any type of crisis.  2.  A live, trained Crisis Counselor receives the text and responds quickly.  3.  The volunteer Crisis Counselor will help you move from a 'hot moment to a cool moment'.    My support system    Clinic Contact:  Phone number:    Contact 1:  Phone number:    Contact 2:  Phone number:    Alevism/:  Phone number:    Therapist:  Phone number:    Local crisis center:    Phone number:    Other community support:  Phone number:

## 2020-09-22 NOTE — PROGRESS NOTES
"SUBJECTIVE:   Patty Zambrano is a 75 year old female who presents for Preventive Visit.      Patient has been advised of split billing requirements and indicates understanding: Yes   Are you in the first 12 months of your Medicare coverage?  No      Muscular: Right arm had an injury on last Saturday 9/20, want checkout.   --fell on outstretched right arm.  Developed aching over bicep/tricept area since then.  It is improving.  Wonders about a 'hairline fracture'  --pain is intermittent; able to use arm as normal, except has more pain with pushing arm out of chair.  Pain is increased with touching area and laying on the arm.  --pain is minor currently, at its worse, 5/10.  --using heat/ice, meloxicam, Tylenol for back pain (see below) - somewhat helpful.    Seeing [facility] ortho for the lower back: left radiculopathy.  Xray showed scoliosis in lumbar spine, stenosis.  Plan for MRI. Doing physical therapy     SAD: on sertraline.  Finds it helpful.  Prefers to continue.  No side effects     Flu shot: Today    Healthy Habits:     In general, how would you rate your overall health?  Good    Frequency of exercise:  4-5 days/week    Duration of exercise:  45-60 minutes    Do you usually eat at least 4 servings of fruit and vegetables a day, include whole grains    & fiber and avoid regularly eating high fat or \"junk\" foods?  Yes    Taking medications regularly:  Yes    Medication side effects:  None    Ability to successfully perform activities of daily living:  No assistance needed    Home Safety:  Lack of grab bars in the bathroom    Hearing Impairment:  Difficulty following a conversation in a noisy restaurant or crowded room    In the past 6 months, have you been bothered by leaking of urine?  No    In general, how would you rate your overall mental or emotional health?  Good      PHQ-2 Total Score: 0    Additional concerns today:  Yes  Hyperlipidemia   Associated symptoms include arthralgias and myalgias. Pertinent negatives " include no abdominal pain, chest pain, chills, congestion, coughing, fever, headaches, joint swelling, nausea, rash, sore throat or weakness.       Do you feel safe in your environment? Yes    Have you ever done Advance Care Planning? (For example, a Health Directive, POLST, or a discussion with a medical provider or your loved ones about your wishes): No, advance care planning information given to patient to review.  Patient plans to discuss their wishes with loved ones or provider.        Fall risk  Fallen 2 or more times in the past year?: No  Any fall with injury in the past year?: No    Cognitive Screening   1) Repeat 3 items (Leader, Season, Table)  All 3 words repeated back/  2) Clock draw: NORMAL  3) 3 item recall: Recalls 3 objects  Results: 3 items recalled: COGNITIVE IMPAIRMENT LESS LIKELY    Mini-CogTM Copyright S Matt. Licensed by the author for use in [facility]; reprinted with permission (sonia@[email or web address]). All rights reserved.      Do you have sleep apnea, excessive snoring or daytime drowsiness?: no    Reviewed and updated as needed this visit by clinical staff  Tobacco  Allergies  Meds         Reviewed and updated as needed this visit by Provider        Social History     Tobacco Use     Smoking status: Never Smoker     Smokeless tobacco: Never Used   Substance Use Topics     Alcohol use: Yes     Comment: rare     If you drink alcohol do you typically have >3 drinks per day or >7 drinks per week? No    Alcohol Use 9/22/2020   Prescreen: >3 drinks/day or >7 drinks/week? -   Prescreen: >3 drinks/day or >7 drinks/week? No             Current providers sharing in care for this patient include:   Patient Care Team:  Jenny Guzman DO as PCP - General (Internal Medicine)  Jenny Guzman DO as Assigned PCP    The following health maintenance items are reviewed in Epic and correct as of today:  Health Maintenance   Topic Date Due     DEPRESSION ACTION PLAN  1945      INFLUENZA VACCINE (1) 09/01/2020     FALL RISK ASSESSMENT  09/17/2020     MEDICARE ANNUAL WELLNESS VISIT  09/17/2020     PHQ-9  12/08/2020     DTAP/TDAP/TD IMMUNIZATION (3 - Td) 05/31/2021     MAMMO SCREENING  10/09/2021     LIPID  09/04/2023     ADVANCE CARE PLANNING  10/31/2024     COLORECTAL CANCER SCREENING  12/15/2026     DEXA  Completed     HEPATITIS C SCREENING  Completed     PNEUMOCOCCAL IMMUNIZATION 65+ LOW/MEDIUM RISK  Completed     ZOSTER IMMUNIZATION  Completed     IPV IMMUNIZATION  Aged Out     MENINGITIS IMMUNIZATION  Aged Out     HEPATITIS B IMMUNIZATION  Aged Out     Current Outpatient Medications   Medication Sig Dispense Refill     ASPIRIN 81 MG OR TABS 1 TABLET DAILY       CALCIUM + D 600-200 MG-UNIT OR TABS 2 TABLET DAILY       Cholecalciferol (VITAMIN D) 2000 UNITS tablet Take 2,000 Units by mouth daily 100 tablet 3     cycloSPORINE (RESTASIS) 0.05 % ophthalmic emulsion Place 1 drop into both eyes 2 times daily       FISH OIL 1,500 mg. Daily       GLUCOSAMINE CHONDRO COMPLEX 500-400 MG OR TABS 3 tablets by mouth daily       MELATONIN PO Take 3 mg by mouth At Bedtime        meloxicam (MOBIC) 7.5 MG tablet Take 7.5 mg by mouth 2 times daily For 30 days       METAMUCIL OR Take 1 teaspoonful once daily       minoxidil (ROGAINE) 5 % external solution Apply topically 2 times daily 2%       MULTIVITAMIN OR Take 1 tablet by mouth daily       Probiotic Product (PROBIOTIC ACIDOPHILUS) CAPS Take 1 capsule by mouth daily        sertraline (ZOLOFT) 25 MG tablet Take 1 tablet (25 mg) by mouth daily 90 tablet 3         Review of Systems   Constitutional: Negative for chills and fever.   HENT: Positive for hearing loss. Negative for congestion, ear pain and sore throat.    Eyes: Negative for pain and visual disturbance.   Respiratory: Negative for cough and shortness of breath.    Cardiovascular: Negative for chest pain, palpitations and peripheral edema.   Gastrointestinal: Negative for abdominal pain,  "constipation, diarrhea, heartburn, hematochezia and nausea.   Breasts:  Negative for tenderness, breast mass and discharge.   Genitourinary: Negative for dysuria, frequency, genital sores, hematuria, pelvic pain, urgency, vaginal bleeding and vaginal discharge.   Musculoskeletal: Positive for arthralgias and myalgias. Negative for joint swelling.   Skin: Negative for rash.   Neurological: Negative for dizziness, weakness, headaches and paresthesias.   Psychiatric/Behavioral: Negative for mood changes. The patient is not nervous/anxious.          OBJECTIVE:   /72   Pulse 102   Temp 97.9  F (36.6  C) (Tympanic)   Resp 16   Ht 1.71 m (5' 7.32\")   Wt 69.4 kg (153 lb)   SpO2 98%   Breastfeeding No   BMI 23.73 kg/m   Estimated body mass index is 23.73 kg/m  as calculated from the following:    Height as of this encounter: 1.71 m (5' 7.32\").    Weight as of this encounter: 69.4 kg (153 lb).  Physical Exam  GENERAL APPEARANCE: healthy, alert and no distress  EYES: Eyes grossly normal to inspection, PERRL and conjunctivae and sclerae normal  HENT: ear canals and TM's normal and nose and mouth without ulcers or lesions  NECK: no adenopathy, no asymmetry, masses, or scars and thyroid normal to palpation  RESP: lungs clear to auscultation - no rales, rhonchi or wheezes  CV: regular rates and rhythm, normal S1 S2, no S3 or S4 and no murmur, click or rub  LYMPHATICS: no cervical adenopathy  ABDOMEN: soft, nontender, without hepatosplenomegaly or masses and bowel sounds normal  MS: very mild pain with palpation over right tricep area.  Full ROM  SKIN: no suspicious lesions or rashes  PSYCH: mentation appears normal and affect normal/bright        ASSESSMENT / PLAN:       ICD-10-CM    1. Encounter for Medicare annual wellness exam  Z00.00    2. Seasonal affective disorder (H)  F33.8 sertraline (ZOLOFT) 25 MG tablet     DEPRESSION ACTION PLAN (DAP)     OFFICE/OUTPT VISIT,EST,LEVL III     CANCELED: OFFICE/OUTPT " "VISIT,EST,LEVL IV   3. CKD (chronic kidney disease) stage 3, GFR 30-59 ml/min  N18.3 Basic metabolic panel   4. Arm contusion, right, sequela  S40.021S    5. Need for prophylactic vaccination and inoculation against influenza  Z23 FLUZONE HIGH DOSE 65+  [66209]     ADMIN INFLUENZA (For MEDICARE Patients ONLY) []       Patient has been advised of split billing requirements and indicates understanding: Yes  COUNSELING:  Reviewed preventive health counseling, as reflected in patient instructions    Estimated body mass index is 23.73 kg/m  as calculated from the following:    Height as of this encounter: 1.71 m (5' 7.32\").    Weight as of this encounter: 69.4 kg (153 lb).        She reports that she has never smoked. She has never used smokeless tobacco.      Appropriate preventive services were discussed with this patient, including applicable screening as appropriate for cardiovascular disease, diabetes, osteopenia/osteoporosis, and glaucoma.  As appropriate for age/gender, discussed screening for colorectal cancer, prostate cancer, breast cancer, and cervical cancer. Checklist reviewing preventive services available has been given to the patient.    Reviewed patients plan of care and provided an AVS. The Complex Care Plan (for patients with higher acuity and needing more deliberate coordination of services) for Patty meets the Care Plan requirement. This Care Plan has been established and reviewed with the Patient.    Counseling Resources:  ATP IV Guidelines  Pooled Cohorts Equation Calculator  Breast Cancer Risk Calculator  Breast Cancer: Medication to Reduce Risk  FRAX Risk Assessment  ICSI Preventive Guidelines  Dietary Guidelines for Americans, 2010  USDA's MyPlate  ASA Prophylaxis  Lung CA Screening    Jenny Guzman, DO  [facility]    Identified Health Risks:    The patient was provided with written information regarding signs of hearing loss.  "

## 2020-09-22 NOTE — PATIENT INSTRUCTIONS
Patient Education   Personalized Prevention Plan  You are due for the preventive services outlined below.  Your care team is available to assist you in scheduling these services.  If you have already completed any of these items, please share that information with your care team to update in your medical record.  Health Maintenance Due   Topic Date Due     Depression Action Plan  1945     Flu Vaccine (1) 09/01/2020     FALL RISK ASSESSMENT  09/17/2020     Annual Wellness Visit  09/17/2020        Patient Education   Personalized Prevention Plan  You are due for the preventive services outlined below.  Your care team is available to assist you in scheduling these services.  If you have already completed any of these items, please share that information with your care team to update in your medical record.  Health Maintenance Due   Topic Date Due     Depression Action Plan  1945     Flu Vaccine (1) 09/01/2020     FALL RISK ASSESSMENT  09/17/2020     Annual Wellness Visit  09/17/2020       Signs of Hearing Loss     Hearing much better with one ear can be a sign of hearing loss.     Hearing loss is a problem shared by many people. In fact, it is one of the most common health conditions, particularly as people age. Most people over age 65 have some hearing loss, and by age 80, almost everyone does. Because hearing loss usually occurs slowly over the years, you may not realize your hearing ability has gotten worse.  Have your hearing checked  Contact your healthcare provider if you:    Have to strain to hear normal conversation    Have to watch other people s faces very carefully to follow what they re saying    Need to ask people to repeat what they ve said    Often misunderstand what people are saying    Turn the volume of the television or radio up so high that others complain    Feel that people are mumbling when they re talking to you    Find that the effort to hear leaves you feeling tired and  irritated    Notice, when using the phone, that you hear better with one ear than the other  Date Last Reviewed: 12/1/2016 2000-2019 [employer]. [address] [place] [address], [place] [address]. All rights reserved. This information is not intended as a substitute for professional medical care. Always follow your healthcare professional's instructions.              1. Refill of sertraline.  If you decide to go off in the spring, take every other day x 1-2 weeks, then stop  2. Labs today

## 2020-10-07 ENCOUNTER — HOSPITAL ENCOUNTER (OUTPATIENT)
Dept: MRI IMAGING | Facility: CLINIC | Age: 75
Discharge: HOME | End: 2020-10-07
Attending: NURSE PRACTITIONER | Admitting: NURSE PRACTITIONER
Payer: COMMERCIAL

## 2020-10-07 DIAGNOSIS — M54.50 ACUTE LOW BACK PAIN: ICD-10-CM

## 2020-10-07 PROCEDURE — 72148 MRI LUMBAR SPINE W/O DYE: CPT

## 2020-10-14 ENCOUNTER — HOSPITAL ENCOUNTER (OUTPATIENT)
Dept: PHYSICAL THERAPY | Facility: CLINIC | Age: 75
Setting detail: THERAPIES SERIES
End: 2020-10-14
Attending: NURSE PRACTITIONER
Payer: COMMERCIAL

## 2020-10-14 PROCEDURE — 97110 THERAPEUTIC EXERCISES: CPT | Mod: GP | Performed by: PHYSICAL THERAPIST

## 2020-10-21 ENCOUNTER — HOSPITAL ENCOUNTER (OUTPATIENT)
Dept: PHYSICAL THERAPY | Facility: CLINIC | Age: 75
Setting detail: THERAPIES SERIES
End: 2020-10-21
Attending: NURSE PRACTITIONER
Payer: COMMERCIAL

## 2020-10-21 PROCEDURE — 97110 THERAPEUTIC EXERCISES: CPT | Mod: GP | Performed by: PHYSICAL THERAPIST

## 2020-10-23 ENCOUNTER — TRANSFERRED RECORDS (OUTPATIENT)
Dept: HEALTH INFORMATION MANAGEMENT | Facility: CLINIC | Age: 75
End: 2020-10-23

## 2020-10-28 ENCOUNTER — HOSPITAL ENCOUNTER (OUTPATIENT)
Dept: PHYSICAL THERAPY | Facility: CLINIC | Age: 75
Setting detail: THERAPIES SERIES
End: 2020-10-28
Attending: NURSE PRACTITIONER
Payer: COMMERCIAL

## 2020-10-28 PROCEDURE — 97110 THERAPEUTIC EXERCISES: CPT | Mod: GP | Performed by: PHYSICAL THERAPIST

## 2020-11-03 ENCOUNTER — HOSPITAL ENCOUNTER (OUTPATIENT)
Dept: MAMMOGRAPHY | Facility: CLINIC | Age: 75
Discharge: HOME | End: 2020-11-03
Attending: INTERNAL MEDICINE | Admitting: INTERNAL MEDICINE
Payer: COMMERCIAL

## 2020-11-03 DIAGNOSIS — Z12.31 ENCOUNTER FOR SCREENING MAMMOGRAM FOR BREAST CANCER: ICD-10-CM

## 2020-11-03 PROCEDURE — 77067 SCR MAMMO BI INCL CAD: CPT

## 2020-11-06 ENCOUNTER — HOSPITAL ENCOUNTER (OUTPATIENT)
Dept: PHYSICAL THERAPY | Facility: CLINIC | Age: 75
Setting detail: THERAPIES SERIES
End: 2020-11-06
Attending: NURSE PRACTITIONER
Payer: COMMERCIAL

## 2020-11-06 PROCEDURE — 97110 THERAPEUTIC EXERCISES: CPT | Mod: GP | Performed by: PHYSICAL THERAPIST

## 2020-11-06 PROCEDURE — 97161 PT EVAL LOW COMPLEX 20 MIN: CPT | Mod: GP | Performed by: PHYSICAL THERAPIST

## 2020-11-06 NOTE — PROGRESS NOTES
11/06/20 1400   General Information   Type of Visit Initial OP Ortho PT Evaluation   Start of Care Date 11/06/20   Referring Physician Wiliam Serra   Patient/Family Goals Statement To have full ROM without pain   Orders Evaluate and Treat   Date of Order 10/23/20   Medical Diagnosis R shoulder pain   Body Part(s)   Body Part(s) Shoulder   Presentation and Etiology   Pertinent history of current problem (include personal factors and/or comorbidities that impact the POC) Pt states on 9/19/20 she tripped and fell onto R arm.  Pt initially noted pain in the humerus.  Pt has not had any tests or injections.  Currently R shoulder intermittent pain 6/10.   No radiating pain.  Pt also notes R neck pain 2/10.   Meds:  tylenol, meloxicam (for her back).  Pt is R dominant.   PMHX:  depression  (on meds), arthritis,   R knee scope.  Back pain w/ L radicular symptoms.  Low complexity   Impairments A. Pain;D. Decreased ROM;F. Decreased strength and endurance;M. Locking or catching   Onset date of current episode/exacerbation 09/19/20   Pain quality A. Sharp;C. Aching   Pain exacerbation comment Reaching across body, reaching down, reaching behind back.  Showering, dressing   Pain/symptoms eased by C. Rest;E. Changing positions;F. Certain positions;G. Heat;H. Cold;I. OTC medication(s)   Progression of symptoms since onset: Improved   Prior Level of Function   Functional Level Prior Comment walks 3-4x/wk 1 to 1.5 miles.  Golfs 2 X/wk, gardening, hiking   Current Level of Function   Patient role/employment history C. Homemaker;F. Retired   Fall Risk Screen   Fall screen completed by PT   Have you fallen 2 or more times in the past year? No   Have you fallen and had an injury in the past year? Yes   Timed Up and Go score (seconds) 11.5   Is patient a fall risk? No   Abuse Screen (yes response referral indicated)   Feels Unsafe at Home or Work/School no   Feels Threatened by Someone no   Does Anyone Try to Keep You From Having  Contact with Others or Doing Things Outside Your Home? no   Shoulder Objective Findings   Side (if bilateral, select both right and left) Right   Posture mild FH, mild rounded shoulders   Cervical Screen (ROM, quadrant) flex/ext/ B rot WFL , no complaints   Scapulothoracic Rhythm mild decreased control B   Pec Minor (supine) Flexibility Mild + tightness B    Neer's Test mild complaint   Raymond-Marin Test neg   Coracoid Test positive   Crossover Test +   Shoulder Special Tests Comments Empty can neg,  Lift off test neg   Palpation Mild tenderness R AC joint and LH biceps   Accessory Motion/Joint Mobility WNL, no complaints   Right Shoulder Flexion AROM 135* B    Right Shoulder Flexion PROM 145*   Right Shoulder Abduction AROM 130* B    Right Shoulder Abduction PROM 148*   Right Shoulder ER AROM WNL B   Right Shoulder IR AROM R to T11, L to T6   Right Shoulder Flexion Strength R 4/5, L 5/5   Right Shoulder Abduction Strength R 4-/5, L 4+/5   Right Shoulder ER Strength B 5/5   Right Shoulder IR Strength B 5/5   Planned Therapy Interventions   Planned Therapy Interventions manual therapy;ROM;strengthening;stretching  (posture)   Clinical Impression   Criteria for Skilled Therapeutic Interventions Met yes, treatment indicated   PT Diagnosis R shoulder pain   Influenced by the following impairments pain, decreased mobility, decreased strength   Functional limitations due to impairments reaching across body, reaching behind back, lifting   Clinical Presentation Stable/Uncomplicated   Clinical Presentation Rationale pt notes it is improving   Clinical Decision Making (Complexity) Low complexity   Therapy Frequency 1 time/week   Predicted Duration of Therapy Intervention (days/wks) 4 weeks then 1X in 2 weeks = 5 visits   Risk & Benefits of therapy have been explained Yes   Patient, Family & other staff in agreement with plan of care Yes   Education Assessment   Barriers to Learning No barriers   ORTHO GOALS   PT Ortho  Eval Goals 1   Ortho Goal 1   Goal Identifier 1   Goal Description Pt will be able to reach across body w/ ADL's w/ shoulder pain no > 2/10   Target Date 12/21/20   Ortho Goal 2   Goal Identifier 2   Goal Description Pt will be able to reach behind her back w/ ADL's w/ shoulder pain no > 2/10   Target Date 12/21/20   Ortho Goal 3   Goal Identifier 3.   Goal Description Pt will be independent and consisitent w/ HEP   Target Date 12/21/20   Total Evaluation Time   PT Jas, Low Complexity Minutes (71465) 25     Thank you for this referral,    Yolis Guzman, PT,   #[id number]  [facility].  [phone]

## 2020-11-18 ENCOUNTER — HOSPITAL ENCOUNTER (OUTPATIENT)
Dept: PHYSICAL THERAPY | Facility: CLINIC | Age: 75
Setting detail: THERAPIES SERIES
End: 2020-11-18
Attending: NURSE PRACTITIONER
Payer: COMMERCIAL

## 2020-11-18 PROCEDURE — 97110 THERAPEUTIC EXERCISES: CPT | Mod: GP | Performed by: PHYSICAL THERAPIST

## 2020-11-23 ENCOUNTER — TRANSFERRED RECORDS (OUTPATIENT)
Dept: HEALTH INFORMATION MANAGEMENT | Facility: CLINIC | Age: 75
End: 2020-11-23

## 2020-12-01 ENCOUNTER — HOSPITAL ENCOUNTER (OUTPATIENT)
Dept: PHYSICAL THERAPY | Facility: CLINIC | Age: 75
Setting detail: THERAPIES SERIES
End: 2020-12-01
Attending: NURSE PRACTITIONER
Payer: COMMERCIAL

## 2020-12-01 PROCEDURE — 97110 THERAPEUTIC EXERCISES: CPT | Mod: GP | Performed by: PHYSICAL THERAPIST

## 2020-12-23 NOTE — PROGRESS NOTES
"   OUTPATIENT PHYSICAL THERAPY DISCHARGE SUMMARY   Wiliam Serra NP 10/28/20 0900   Signing Clinician's Name / Credentials   Signing clinician's name / credentials Yolis Guzman, PT [id number]   Session Number   Session Number 4 [employer]   Ortho Goal 1   Goal Identifier 1.   Goal Description Pt will be able to walk 1.5 miles regularly w/ pain no > 3/10.  10/28/20 has not been walking w/ weather.     Target Date 11/07/20   Ortho Goal 2   Goal Identifier 2.   Goal Description Pt will be able to stand 15 min w/ ADL's w/pain no > 3/10.  10/28/20  intermittent pain 6/10   Target Date 11/07/20   Ortho Goal 3   Goal Identifier 3.    Goal Description Pt will report increased ease getting comfortable to sleep.  10/28/20 improving but also notes she is taking flexeril at bedtime.     Target Date 11/07/20   Ortho Goal 4   Goal Identifier 4.   Goal Description Pt will be independent and consistent w/HEP   Target Date 11/07/20   Subjective Report   Subjective Report Pt states she is doing a little better.  Pain level today 1/10.  Pt notes symptoms come and go.  Pt went to pool 10/27/20 which went ok.  Pt saw MD--held meloxicam.  Pt is to schedule JUS.  Pt states she has a new order for her R shoulder (will schedule new eval).     Objective Measures    LROM flex and B SB WNL, ext 50%    MMT: L hip abd 4/5, ankle DF 5/5, > toe ext 4/5   Therapeutic Procedure/exercise   Treatment Detail    Seated TA set.  DKTC . Reviewed LTRS as pt had questions.    Piriformis > 90* 30\" B.      Supine TA set w/ slight tilt and march 5 X 2 (Pt noted tingling w/ L LE--will assess tolerance). Clam  L only w/ YTB X 15.  Bridges X 10.  Child pose. Cat/cow   Plan   Home program as above   Plan  Pt to have injection and cont w/ ex.  Pt reported she was doing better after injection and did not schedule further visits.     comments Progresses towards goals as noted above.     "

## 2020-12-28 ENCOUNTER — TRANSFERRED RECORDS (OUTPATIENT)
Dept: HEALTH INFORMATION MANAGEMENT | Facility: CLINIC | Age: 75
End: 2020-12-28

## 2021-01-03 ENCOUNTER — HOSPITAL ENCOUNTER (EMERGENCY)
Facility: CLINIC | Age: 76
Discharge: HOME | End: 2021-01-03
Attending: NURSE PRACTITIONER | Admitting: NURSE PRACTITIONER
Payer: COMMERCIAL

## 2021-01-03 VITALS
TEMPERATURE: 96.9 F | WEIGHT: 153 LBS | HEART RATE: 85 BPM | RESPIRATION RATE: 16 BRPM | DIASTOLIC BLOOD PRESSURE: 83 MMHG | BODY MASS INDEX: 23.73 KG/M2 | SYSTOLIC BLOOD PRESSURE: 133 MMHG | OXYGEN SATURATION: 99 %

## 2021-01-03 DIAGNOSIS — J02.9 PHARYNGITIS: ICD-10-CM

## 2021-01-03 DIAGNOSIS — Z20.822 ENCOUNTER FOR LABORATORY TESTING FOR COVID-19 VIRUS: ICD-10-CM

## 2021-01-03 LAB
DEPRECATED S PYO AG THROAT QL EIA: NEGATIVE
FLUABV+SARS-COV-2+RSV PNL RESP NAA+PROBE: NEGATIVE
FLUABV+SARS-COV-2+RSV PNL RESP NAA+PROBE: NEGATIVE
LABORATORY COMMENT REPORT: NORMAL
RSV RNA SPEC QL NAA+PROBE: NORMAL
SARS-COV-2 RNA SPEC QL NAA+PROBE: NEGATIVE
SPECIMEN SOURCE: NORMAL
STREP GROUP A PCR: NOT DETECTED

## 2021-01-03 PROCEDURE — 999N001174 HC STATISTIC STREP A RAPID: Performed by: NURSE PRACTITIONER

## 2021-01-03 PROCEDURE — G0463 HOSPITAL OUTPT CLINIC VISIT: HCPCS | Performed by: NURSE PRACTITIONER

## 2021-01-03 PROCEDURE — C9803 HOPD COVID-19 SPEC COLLECT: HCPCS | Performed by: NURSE PRACTITIONER

## 2021-01-03 PROCEDURE — 87636 SARSCOV2 & INF A&B AMP PRB: CPT | Performed by: NURSE PRACTITIONER

## 2021-01-03 PROCEDURE — 99214 OFFICE O/P EST MOD 30 MIN: CPT | Performed by: NURSE PRACTITIONER

## 2021-01-03 PROCEDURE — 87651 STREP A DNA AMP PROBE: CPT | Performed by: NURSE PRACTITIONER

## 2021-01-03 ASSESSMENT — ENCOUNTER SYMPTOMS
ABDOMINAL PAIN: 0
DIZZINESS: 0
NUMBNESS: 0
EYE REDNESS: 0
ARTHRALGIAS: 0
NAUSEA: 0
FATIGUE: 0
EYE DISCHARGE: 0
SORE THROAT: 1
JOINT SWELLING: 0
CHILLS: 0
HEADACHES: 0
MYALGIAS: 0
TROUBLE SWALLOWING: 0
FEVER: 0
COUGH: 0
LIGHT-HEADEDNESS: 0
SINUS PAIN: 0
VOMITING: 0
SHORTNESS OF BREATH: 0
SINUS PRESSURE: 0
RHINORRHEA: 0
WEAKNESS: 0

## 2021-01-03 NOTE — DISCHARGE INSTRUCTIONS
"Discharge Instructions for COVID-19 Patients  You have--or may have--COVID-19. Please follow the instructions listed below.   If you have a weakened immune system, discuss with your doctor any other actions you need to take.  How can I protect others?  If you have symptoms (fever, cough, body aches or trouble breathing):  Stay home and away from others (self-isolate) until:  At least 10 days have passed since your symptoms started, And   You've had no fever--and no medicine that reduces fever--for 1 full day (24 hours), And    Your other symptoms have resolved (gotten better).  If you don't show symptoms, but testing showed that you have COVID-19:  Stay home and away from others (self-isolate). Follow the tips under \"How do I self-isolate?\" below for 10 days (20 days if you have a weak immune system).  You don't need to be retested for COVID-19 before going back to school or work. As long as you're fever-free and feeling better, you can go back to school, work and other activities after waiting the 10 or 20 days.   How do I self-isolate?  Stay in your own room, even for meals. Use your own bathroom if you can.  Stay away from others in your home. No hugging, kissing or shaking hands. No visitors.  Don't go to work, school or anywhere else.  Clean \"high touch\" surfaces often (doorknobs, counters, handles). Use household cleaning spray or wipes. You'll find a full list of  on the EPA website: www.epa.gov/pesticide-registration/list-n-disinfectants-use-against-sars-cov-2.  Cover your mouth and nose with a mask or other face covering to avoid spreading germs.  Wash your hands and face often. Use soap and water.  Caregivers in these groups are at risk for severe illness due to COVID-19:  People 65 years and older  People who live in a nursing home or long-term care facility  People with chronic disease (lung, heart, cancer, diabetes, kidney, liver, immunologic)  People who have a weakened immune system, including " those who:  Are in cancer treatment  Take medicine that weakens the immune system, such as corticosteroids  Had a bone marrow or organ transplant  Have an immune deficiency  Have poorly controlled HIV or AIDS  Are obese (body mass index of 40 or higher)  Smoke regularly  Caregivers should wear gloves while washing dishes, handling laundry and cleaning bedrooms and bathrooms.  Use caution when washing and drying laundry: Don't shake dirty laundry and use the warmest water setting that you can.  For more tips on managing your health at home, go to www.cdc.gov/coronavirus/2019-ncov/downloads/10Things.pdf.  How can I take care of myself at home?  Get lots of rest. Drink extra fluids (unless a doctor has told you not to).    Take Tylenol (acetaminophen) for fever or pain. If you have liver or kidney problems, ask your family doctor if it's okay to take Tylenol.     Adults can take either:  650 mg (two 325 mg pills) every 4 to 6 hours, or   1,000 mg (two 500 mg pills) every 8 hours as needed.  Note: Don't take more than 3,000 mg in one day. Acetaminophen is found in many medicines (both prescribed and over-the-counter medicines). Read all labels to be sure you don't take too much.   For children, check the Tylenol bottle for the right dose. The dose is based on the child's age or weight.  If you have other health problems (like cancer, heart failure, an organ transplant or severe kidney disease): Call your specialty clinic if you don't feel better in the next 2 days.    Know when to call 911. Emergency warning signs include:  Trouble breathing or shortness of breath  Pain or pressure in the chest that doesn't go away  Feeling confused like you haven't felt before, or not being able to wake up  Bluish-colored lips or face    Your doctor may have prescribed a blood thinner medicine. Follow their instructions.  Where can I get more information?   [employer] [place] - About COVID-19: [email or web address]  [facility] - What to  Do If You're Sick: www.cdc.gov/coronavirus/2019-ncov/about/steps-when-sick.html  CDC - Ending Home Isolation: www.cdc.gov/coronavirus/2019-ncov/hcp/disposition-in-home-patients.html  CDC - Caring for Someone: www.cdc.gov/coronavirus/2019-ncov/if-you-are-sick/care-for-someone.html  [facility] - Interim Guidance for Hospital Discharge to Home: www.[facility].mn./diseases/coronavirus/hcp/hospdischarge.pdf  [facility] clinical trials (COVID-19 research studies): clinicalaffairs.[facility]-clinical-trials  Below are the COVID-19 hotlines at the [place] Department of [facility] ([facility]). Interpreters are available.  For health questions: Call 576-349-4470 or 1-583.509.5337 (7 a.m. to 7 p.m.)  For questions about schools and childcare: Call 360-713-4626 or 1-197.575.7207 (7 a.m. to 7 p.m.)    For informational purposes only. Not to replace the advice of your health care provider. Clinically reviewed by the Infection Prevention Team. Copyright   2020 [facility]. All rights reserved. Oscar [id number] - REV 08/04/20.

## 2021-01-03 NOTE — ED TRIAGE NOTES
"had evisit and scheduled covid and strep test for today via clinic. Sore throat x3 days. No fevers, no SOB. \" if it wasn't for covid I wouldn't have came in. Just want to be sure\"   "

## 2021-01-03 NOTE — ED AVS SNAPSHOT
[facility] Emergency Dept  [address] [place] [address]  Phone: [phone]  Fax: [phone]                                    Patty Zambrano   MRN: [id number]    Department: [facility] Emergency Dept   Date of Visit: 1/3/2021           After Visit Summary Signature Page    I have received my discharge instructions, and my questions have been answered. I have discussed any challenges I see with this plan with the nurse or doctor.    ..........................................................................................................................................  Patient/Patient Representative Signature      ..........................................................................................................................................  Patient Representative Print Name and Relationship to Patient    ..................................................               ................................................  Date                                   Time    ..........................................................................................................................................  Reviewed by Signature/Title    ...................................................              ..............................................  Date                                               Time          22EPIC Rev 08/18

## 2021-01-03 NOTE — ED PROVIDER NOTES
History     Chief Complaint   Patient presents with     Pharyngitis     had evisit and scheduled covid and strep test for today via clinic.      HPI  Patty Zambrano is a 75 year old female who presents to the urgent care for evaluation of pharyngitis for 3 days.  Denies other accompanying symptoms including headache, fever, congestion, cough, chest pain, shortness of breath and abdominal pain.  Completed E-visit and advised for strep and COVID-19 testing however this was not currently available.  No known sick contacts.  Has been utilizing ibuprofen with some relief.    Allergies:  Allergies   Allergen Reactions     Augmentin Diarrhea       Problem List:    Patient Active Problem List    Diagnosis Date Noted     CKD (chronic kidney disease) stage 3, GFR 30-59 ml/min 09/22/2020     Priority: Medium     Dysthymia 07/12/2017     Priority: Medium     Sigmoid diverticulitis 08/26/2016     Priority: Medium     Episode in 2006 or so, then 2015 and 2016, 12/2017.       Cataract 12/29/2015     Priority: Medium     HYPERLIPIDEMIA LDL GOAL <160 10/31/2010     Priority: Medium     Hypovitaminosis D 04/07/2010     Priority: Medium     Osteopenia 03/19/2009     Priority: Medium     Sinusitis, chronic 02/18/2008     Priority: Medium     MRI 2003 positive for sinus disease.  Problem list name updated by automated process. Provider to review       Postmenopausal atrophic vaginitis 01/15/2007     Priority: Medium        Past Medical History:    Past Medical History:   Diagnosis Date     Abnormal Papanicolaou smear of cervix and cervical HPV 1978     Brachial neuritis or radiculitis NOS      Diverticulitis of colon (without mention of hemorrhage)(562.11) 05/04     Postmenopausal atrophic vaginitis      Raynaud's syndrome        Past Surgical History:    Past Surgical History:   Procedure Laterality Date     COLONOSCOPY  4/3/2014    Procedure: COLONOSCOPY;  Colonoscopy;  Surgeon: Richie Kwon MD;  Location: [facility]     COLONOSCOPY N/A  12/15/2016    Procedure: COLONOSCOPY;  Surgeon: Richie Kwon MD;  Location: [facility]     EYE SURGERY  03/2017    laser     HC COLONOSCOPY THRU STOMA, DIAGNOSTIC  11/03    due 10 years     HC DILATION/CURETTAGE DIAG/THER NON OB       SURGICAL HISTORY OF -   05/05    right knee arthroscopy       Family History:    Family History   Problem Relation Age of Onset     Hypertension Mother      Osteoporosis Mother      Cancer Maternal Grandmother         abdominal CA     Heart Disease Maternal Grandfather      Alcohol/Drug Paternal Grandfather      Cancer Brother         sarcoma     Arthritis Father      Alzheimer Disease Father        Social History:  Marital Status:   [2]  Social History     Tobacco Use     Smoking status: Never Smoker     Smokeless tobacco: Never Used   Substance Use Topics     Alcohol use: Yes     Comment: rare     Drug use: No        Medications:         ASPIRIN 81 MG OR TABS       CALCIUM + D 600-200 MG-UNIT OR TABS       Cholecalciferol (VITAMIN D) 2000 UNITS tablet       cycloSPORINE (RESTASIS) 0.05 % ophthalmic emulsion       FISH OIL       GLUCOSAMINE CHONDRO COMPLEX 500-400 MG OR TABS       MELATONIN PO       meloxicam (MOBIC) 7.5 MG tablet       METAMUCIL OR       minoxidil (ROGAINE) 5 % external solution       MULTIVITAMIN OR       Probiotic Product (PROBIOTIC ACIDOPHILUS) CAPS       sertraline (ZOLOFT) 25 MG tablet          Review of Systems   Constitutional: Negative for chills, fatigue and fever.   HENT: Positive for sore throat. Negative for congestion, ear discharge, ear pain, rhinorrhea, sinus pressure, sinus pain and trouble swallowing.    Eyes: Negative for discharge and redness.   Respiratory: Negative for cough and shortness of breath.    Cardiovascular: Negative for chest pain.   Gastrointestinal: Negative for abdominal pain, nausea and vomiting.   Musculoskeletal: Negative for arthralgias, joint swelling and myalgias.   Skin: Negative for rash.   Neurological: Negative for  dizziness, weakness, light-headedness, numbness and headaches.   All other systems reviewed and are negative.      Physical Exam   BP: 133/83  Pulse: 85  Temp: 96.9  F (36.1  C)  Resp: 16  Weight: 69.4 kg (153 lb)  SpO2: 99 %    Physical Exam  Visit was completed using real time virtual video/chat while patient was physically in the department. Patient is showing no signs of respiratory distress or acute compromise. Able to speak clearly and in full sentences without difficulty. Alert and oriented. Ambulatory to and from the room without difficulty.     ED Course        Procedures    Results for orders placed or performed during the hospital encounter of 01/03/21 (from the past 24 hour(s))   Streptococcus A Rapid Scr w Reflx to PCR    Specimen: Throat   Result Value Ref Range    Strep Specimen Description Throat     Streptococcus Group A Rapid Screen Negative NEG^Negative       Medications - No data to display    Assessments & Plan (with Medical Decision Making)   Patty Zambrano is a 75 year old female who presents to the urgent care for evaluation of pharyngitis for 3 days.  Rapid strep negative, culture pending.  COVID-19 swab pending, home isolation in the interim. May use over the counter medications as needed and appropriate. Increase rest and hydration. Return precautions reviewed, all questions answered. Patient agreeable to plan of care and discharged in stable condition.    I have reviewed the nursing notes.    I have reviewed the findings, diagnosis, plan and need for follow up with the patient.    New Prescriptions    No medications on file       Final diagnoses:   Encounter for laboratory testing for COVID-19 virus   Pharyngitis       1/3/2021   [facility] EMERGENCY DEPT     Melia Dillard, APRN CNP  01/03/21 [id number]

## 2021-01-05 ENCOUNTER — HOSPITAL ENCOUNTER (OUTPATIENT)
Dept: PHYSICAL THERAPY | Facility: CLINIC | Age: 76
Setting detail: THERAPIES SERIES
End: 2021-01-05
Attending: NURSE PRACTITIONER
Payer: COMMERCIAL

## 2021-01-05 PROCEDURE — 97110 THERAPEUTIC EXERCISES: CPT | Mod: GP | Performed by: PHYSICAL THERAPIST

## 2021-01-05 PROCEDURE — 97164 PT RE-EVAL EST PLAN CARE: CPT | Mod: GP | Performed by: PHYSICAL THERAPIST

## 2021-01-05 NOTE — PROGRESS NOTES
OUTPATIENT PHYSICAL THERAPY PROGRESS NOTE   Wiliam Serra NP 9/18/20 to 01/05/21 1400   Signing Clinician's Name / Credentials   Signing clinician's name / credentials Yolis Guzman, PT [id number]   Session Number   Session Number 5 [employer]   Ortho Goal 1   Goal Identifier 1.   Goal Description Pt will be able to walk 1.5 miles regularly w/ pain no > 3/10.  10/28/20 has not been walking w/ weather.  1/5/21 1.5 miles w/o increased pain MET   Target Date 11/07/20   Ortho Goal 2   Goal Identifier 2.   Goal Description Pt will be able to stand 15 min w/ ADL's w/pain no > 3/10.  10/28/20  intermittent pain 6/10.  1/5/21 able to stand up to 30 min working in kitchen MET   Target Date 11/07/20   Ortho Goal 3   Goal Identifier 3.    Goal Description Pt will report increased ease getting comfortable to sleep.  10/28/20 improving but also notes she is taking flexeril at bedtime.   1/5/21 able to sleep at night, not taking flexeril  MET   Target Date 11/07/20   Ortho Goal 4   Goal Identifier 4.   Goal Description Pt will be independent and consistent w/HEP.  1/5/21 MET   Target Date 11/07/20   Ortho Goal 5   Goal Identifier 5   Goal Description Pt will be able to lift 20-25# for household tasks w/ pain no > 2/10   Target Date 02/19/21   Ortho Goal 6   Goal Identifier 6.   Goal Description Pt will have improved L 1st toe ext strength to 5-/5   Target Date 02/19/21   Subjective Report   Subjective Report Pt had 2nd JUS on 12/14/20.  Pt notes she has improved.   Pt notes achying in LB w/ activity 4/10.   Pt cont to report L calf pain intermittent 4-5/10.  Pt cont to report some numbness/ tingling in L LE when she has the pain.   Pt reports her goals are to tolerate lifting 20# and improve core strength.   Objective Measures    LROM flex and B SB WNL, ext 60%    MMT: B hip flex 4+/5.  B quad/ HS/ ankle DF and R toe ext 5/5,  L 1st toe ext 4-/5,  L hip abd 4+/5   Therapeutic Procedure/exercise   Treatment Detail Bridges w/ YTB X 10.    YTB clam X 10 B ( pt noted some L LE irritation w/ L SL will cont to monitor).  Supine TA set w/ marching X 10 B (pt reports this ex helps).  4pt TA set w/ Leg extension.  Slow toe taps.  Tandem stand (difficult)   Plan   Home program as above   Plan  cont 1X/7-10 days up to 3 additional visits.  Progress ex/ body mechanics/  MT prn   Comments   Comments  Pt met goals 1-4, goals 5-6 added today

## 2021-01-13 ENCOUNTER — HOSPITAL ENCOUNTER (OUTPATIENT)
Dept: PHYSICAL THERAPY | Facility: CLINIC | Age: 76
Setting detail: THERAPIES SERIES
End: 2021-01-13
Attending: NURSE PRACTITIONER
Payer: COMMERCIAL

## 2021-01-13 PROCEDURE — 97110 THERAPEUTIC EXERCISES: CPT | Mod: GP | Performed by: PHYSICAL THERAPIST

## 2021-01-26 ENCOUNTER — HOSPITAL ENCOUNTER (OUTPATIENT)
Dept: PHYSICAL THERAPY | Facility: CLINIC | Age: 76
Setting detail: THERAPIES SERIES
End: 2021-01-26
Attending: NURSE PRACTITIONER
Payer: COMMERCIAL

## 2021-01-26 PROCEDURE — 97110 THERAPEUTIC EXERCISES: CPT | Mod: GP | Performed by: PHYSICAL THERAPIST

## 2021-03-10 NOTE — PROGRESS NOTES
OUTPATIENT PHYSICAL THERAPY DISCHARGE SUMMARY   Wiliam Serra 11/6/20 to 01/26/21 1034   Signing Clinician's Name / Credentials   Signing clinician's name / credentials Yolis Guzman, PT [id number]   Session Number   Session Number 4 [employer]    Ortho Goal 1   Goal Identifier 1   Goal Description Pt will be able to reach across body w/ ADL's w/ shoulder pain no > 2/10.  11/18/20 improving.   12/1/20 1/10 MET.  1/26/21 no pain MET   Target Date 12/21/20   Ortho Goal 2   Goal Identifier 2   Goal Description Pt will be able to reach behind her back w/ ADL's w/ shoulder pain no > 2/10.  12/1/20  1/10 MET.  1/26/21 2/10 MET   Target Date 12/21/20   Ortho Goal 3   Goal Identifier 3.   Goal Description Pt will be independent and consisitent w/ HEP.  12/1/20 MET.   Target Date 12/21/20   Subjective Report   Subjective Report Pt states 1X/1-2 days will note pain w/ reaching out across (2/10--short duration) but is unable to intentionally cause the pain.    Objective Measures     shoulder flex R 140*, L 143*,  scaption R 120*, L 145*,  ER  WNL, IR WFL B    R shoulder flex 5/5, abd 5-/5, ER/IR 5/5   Therapeutic Procedure/exercise   Treatment Detail Wall flex B.  Wand scaption X 10 w/ mirror for feedback.  shoulder flex 1# X 15 B.  scaption 1# X 15 B.   B ER seated RTB X 20.      LT set w/ cuing to turn thumbs out.    GTB rows standing X 10.     Plan   Home program ex as above   Plan Pt is to cont on own w/ HEP.  Discharge from physical therapy   Comments   Comments Pt has met goals

## 2021-03-10 NOTE — PROGRESS NOTES
OUTPATIENT PHYSICAL THERAPY DISCHARGE SUMMARY   Wiliam Serra NP 9/18/20 to 01/26/21 1000   Signing Clinician's Name / Credentials   Signing clinician's name / credentials Yolis Guzman, PT [id number]   Session Number   Session Number 7 [employer]   Ortho Goal 1   Goal Identifier 1.   Goal Description Pt will be able to walk 1.5 miles regularly w/ pain no > 3/10.  10/28/20 has not been walking w/ weather.  1/5/21 1.5 miles w/o increased pain MET   Target Date 11/07/20   Ortho Goal 2   Goal Identifier 2.   Goal Description Pt will be able to stand 15 min w/ ADL's w/pain no > 3/10.  10/28/20  intermittent pain 6/10.  1/5/21 able to stand up to 30 min working in kitchen MET   Target Date 11/07/20   Ortho Goal 3   Goal Identifier 3.    Goal Description Pt will report increased ease getting comfortable to sleep.  10/28/20 improving but also notes she is taking flexeril at bedtime.   1/5/21 able to sleep at night, not taking flexeril  MET   Target Date 11/07/20   Ortho Goal 4   Goal Identifier 4.   Goal Description Pt will be independent and consistent w/HEP.  1/5/21 MET   Target Date 11/07/20   Ortho Goal 5   Goal Identifier 5   Goal Description Pt will be able to lift 20-25# for household tasks w/ pain no > 2/10   Target Date 02/19/21   Ortho Goal 6   Goal Identifier 6.   Goal Description Pt will have improved L 1st toe ext strength to 5-/5.  1/26/21 4 to 4+/5   Target Date 02/19/21   Subjective Report   Subjective Report Pt states she is doing quite well.  Pt reports significant decrease in sciatic symptoms.  Pt notes LB will get tired if she overdoes.   Pt does not feel big toe has changed.   Pt is doing ex daily and has started going to the pool.     Objective Measures    LROM flex and B SB WNL, ext 60%    L 1st toe 4 to 4+/5;  L hip abd 5/5.     Therapeutic Procedure/exercise   Treatment Detail Bridges w/ YTB with arms X chest X 15.   YTB clam X 15 B (no tingling reported today).  Supine TA set w/ SLR X 10 B.  4pt bird dog  (U/LE's) x 8 B.  Slow toe taps (emphasized slowly lowering toe).    Tandem walk 20 ' X 2 ( difficult but better when focused on 1 object)   Plan   Home program as above   Plan  Pt to cont on own w/HEP.  Discharge from physical therapy.    Comments   Comments Pt met goals 1-4 , cont to work towards goals 5 -6.

## 2021-04-26 ENCOUNTER — TRANSFERRED RECORDS (OUTPATIENT)
Dept: HEALTH INFORMATION MANAGEMENT | Facility: CLINIC | Age: 76
End: 2021-04-26

## 2021-04-26 ENCOUNTER — MEDICAL CORRESPONDENCE (OUTPATIENT)
Dept: HEALTH INFORMATION MANAGEMENT | Facility: CLINIC | Age: 76
End: 2021-04-26

## 2021-05-17 ENCOUNTER — HOSPITAL ENCOUNTER (OUTPATIENT)
Dept: PHYSICAL THERAPY | Facility: CLINIC | Age: 76
Setting detail: THERAPIES SERIES
End: 2021-05-17
Attending: NURSE PRACTITIONER
Payer: COMMERCIAL

## 2021-05-17 PROCEDURE — 97110 THERAPEUTIC EXERCISES: CPT | Mod: GP | Performed by: PHYSICAL THERAPIST

## 2021-05-17 PROCEDURE — 97162 PT EVAL MOD COMPLEX 30 MIN: CPT | Mod: GP | Performed by: PHYSICAL THERAPIST

## 2021-05-17 PROCEDURE — 97140 MANUAL THERAPY 1/> REGIONS: CPT | Mod: GP | Performed by: PHYSICAL THERAPIST

## 2021-05-17 NOTE — PROGRESS NOTES
05/17/21 1300   General Information   Type of Visit Initial OP Ortho PT Evaluation   Start of Care Date 05/17/21   Referring Physician Wiliam Serra NP    Patient/Family Goals Statement To make sure I am on track w/ what I am doing.  Be able to do my normal activities.     Orders Evaluate and Treat   Date of Order 04/26/21   Medical Diagnosis back pain   Body Part(s)   Body Part(s) Lumbar Spine/SI   Presentation and Etiology   Pertinent history of current problem (include personal factors and/or comorbidities that impact the POC) Pt states LB symptoms flared up approx 6 weeks ago.   Pt states symptoms flared w/ increased activity such as golfing, traveling, gardening.   Pt states she saw NP and she recommended pt cont w/ massage therapy (seeing Jen Pruett) and restart therapy to improve hip strength/ review her ex program.  Pt notes L > R  LBP which is radiating down L  LE to ankle intermittent 6/10.  Pt notes intermittent tingling/ numbness in R LE and both feet.  No weakness.  Negative bowel/ bladder/cough/ sneeze.  No updated tests.   Pt can call NP if she needs another injection--pt feels she may need it.   Meds:  meloxicam.  Flexeril.  Pt notes increased leg cramps.  PMHX: arthritis. Mod: activities.     Impairments A. Pain;J. Burning;K. Numbness;L. Tingling   Pain quality C. Aching;D. Burning;G. Cramping   Pain exacerbation comment worse at end of day after increased physical activity.  Difficulty geting to sleep,  waking 2X/night.  Pt notes she is stiff in am.  Standing > 5min.      Pain/symptoms eased by B. Walking;E. Changing positions;G. Heat;I. OTC medication(s)   Progression of symptoms since onset: Worsened  (now having LE symptoms)   Prior Level of Function   Functional Level Prior Comment walks 1X/wk x 30 min, golfing 1-2X/wk, gardening   Current Level of Function   Patient role/employment history F. Retired   Fall Risk Screen   Fall screen completed by PT   Have you fallen 2 or more times in the  past year? No   Have you fallen and had an injury in the past year? Yes   Timed Up and Go score (seconds) 9   Is patient a fall risk? No   Abuse Screen (yes response referral indicated)   Feels Unsafe at Home or Work/School no   Feels Threatened by Someone no   Does Anyone Try to Keep You From Having Contact with Others or Doing Things Outside Your Home? no   Lumbar Spine/SI Objective Findings   Posture R PSIS/ crest lower.  Decreased lumbar lordosis.  L LE longer in supine.     Gait/Locomotion WNL   Flexion ROM 90%   Extension ROM 50%   Right Side Bending ROM WNL   Left Side Bending ROM WNL   Pelvic Screen R + FB test   Hip Screen PROM ER 35*, L 30*;  IR R 20*, L 22*.  B scour/ FADIR and R RIKY neg;  L RIKY notes tightness   Hip Flexion (L2) Strength 5/5   Hip Abduction Strength B 4-/5   Knee Flexion Strength 5/5 B    Knee Extension (L3) Strength 5/5 B   Ankle Dorsiflexion (L4) Strength 5/5 B   Great Toe Extension (L5) Strength R 5/5, L 4/5   Hamstring Flexibility slight tightness B    Hip Flexor Flexibility R slight tightness,  L WNL   SLR R noted foot tingling   Slump Test neg B    Palpation Mild tenderness L QL and piriformis   Planned Therapy Interventions   Planned Therapy Interventions manual therapy;neuromuscular re-education;ROM;strengthening;stretching  (body mechanics)   Clinical Impression   Criteria for Skilled Therapeutic Interventions Met yes, treatment indicated   PT Diagnosis LBP with radicular symptoms   Influenced by the following impairments pain, numbness, tingling   Functional limitations due to impairments getting to sleep, waking at night, standing,  by end of the day   Clinical Presentation Evolving/Changing   Clinical Presentation Rationale flareup intially was only in LB now having LE symptoms   Clinical Decision Making (Complexity) Moderate complexity   Therapy Frequency 1 time/week   Predicted Duration of Therapy Intervention (days/wks) 4 weeks then 1 X in 2 weeks = 5 visits   Risk &  Benefits of therapy have been explained Yes   Patient, Family & other staff in agreement with plan of care Yes   Education Assessment   Barriers to Learning No barriers   Ortho Goal 1   Goal Identifier 1.   Goal Description Pt will be able to stand X 15 min for household tasks w/ pain no > 3/10   Target Date 07/06/21   Ortho Goal 2   Goal Identifier 2.   Goal Description Pt will report increased ease getting to sleep and wake no > 4X/wk due to symptoms   Target Date 07/06/21   Ortho Goal 3   Goal Identifier 3.   Goal Description Pt will be independent in HEP, have increased awareness of body mechanics and return to walking 3X/wk for exercise   Target Date 07/06/21   Total Evaluation Time   PT Eval, Moderate Complexity Minutes (24599) 35     Thank you for this referral,    Yolis Guzman, PT,   #[id number]  [facility]  [phone]

## 2021-05-26 ENCOUNTER — HOSPITAL ENCOUNTER (OUTPATIENT)
Dept: PHYSICAL THERAPY | Facility: CLINIC | Age: 76
Setting detail: THERAPIES SERIES
End: 2021-05-26
Attending: NURSE PRACTITIONER
Payer: COMMERCIAL

## 2021-05-26 PROCEDURE — 97140 MANUAL THERAPY 1/> REGIONS: CPT | Mod: GP | Performed by: PHYSICAL THERAPIST

## 2021-05-26 PROCEDURE — 97110 THERAPEUTIC EXERCISES: CPT | Mod: GP | Performed by: PHYSICAL THERAPIST

## 2021-06-02 ENCOUNTER — HOSPITAL ENCOUNTER (OUTPATIENT)
Dept: PHYSICAL THERAPY | Facility: CLINIC | Age: 76
Setting detail: THERAPIES SERIES
End: 2021-06-02
Attending: NURSE PRACTITIONER
Payer: COMMERCIAL

## 2021-06-02 PROCEDURE — 97110 THERAPEUTIC EXERCISES: CPT | Mod: GP | Performed by: PHYSICAL THERAPIST

## 2021-06-02 PROCEDURE — 97140 MANUAL THERAPY 1/> REGIONS: CPT | Mod: GP | Performed by: PHYSICAL THERAPIST

## 2021-06-09 ENCOUNTER — HOSPITAL ENCOUNTER (OUTPATIENT)
Dept: PHYSICAL THERAPY | Facility: CLINIC | Age: 76
Setting detail: THERAPIES SERIES
End: 2021-06-09
Attending: NURSE PRACTITIONER
Payer: COMMERCIAL

## 2021-06-09 PROCEDURE — 97110 THERAPEUTIC EXERCISES: CPT | Mod: GP | Performed by: PHYSICAL THERAPIST

## 2021-07-12 ENCOUNTER — TRANSFERRED RECORDS (OUTPATIENT)
Dept: HEALTH INFORMATION MANAGEMENT | Facility: CLINIC | Age: 76
End: 2021-07-12

## 2021-07-14 ENCOUNTER — TRANSFERRED RECORDS (OUTPATIENT)
Dept: HEALTH INFORMATION MANAGEMENT | Facility: CLINIC | Age: 76
End: 2021-07-14

## 2021-07-14 NOTE — PROGRESS NOTES
OUTPATIENT PHYSICAL THERAPY DISCHARGE SUMMARY   Wiliam Serra NP  06/09/21 1100   Signing Clinician's Name / Credentials   Signing clinician's name / credentials Yolis Guzman, PT [id number]   Session Number   Session Number 4   Ortho Goal 1   Goal Identifier 1.   Goal Description Pt will be able to stand X 15 min for household tasks w/ pain no > 3/10.  6/9/21 standing tolerance 1-2 minutes    Target Date 07/06/21   Ortho Goal 2   Goal Identifier 2.   Goal Description Pt will report increased ease getting to sleep and wake no > 4X/wk due to symptoms   Target Date 07/06/21   Ortho Goal 3   Goal Identifier 3.   Goal Description Pt will be independent in HEP, have increased awareness of body mechanics and return to walking 3X/wk for exercise 6/9/21 MET re: HEP and body mechanics   Target Date 07/06/21   Subjective Report   Subjective Report Pt notes good / bad periods of time.  Cont to note burning /tingling in both legs R > L w/ standing 1-2 min.  JUS 6/22/21.     Objective Measures    LROM WFL    R PSIS slightly lower , neg FB test    MMT:  B hip abd 5/5   Therapeutic Procedure/exercise   Treatment Detail Provided handout on body mechanics and reviewed.  Supine TA set w/ heelslide X 10 B ( attempted marching but that aggrevated symptoms).  Bridges X 10.  Hip abd X 10 B.  Clam X 15 B.   (DKTC, piriformis, supine nerve glides on own.)   Plan   Plan for next session Discharge from physical therapy.   Pt to cont w/ her HEP.   Comments   Comments Pt plans to also recheck w/ MD.   Progress towards goals as noted above.

## 2021-07-26 NOTE — PROGRESS NOTES
[facility]          OUTPATIENT PHYSICAL THERAPY ORTHOPEDIC EVALUATION  PLAN OF TREATMENT FOR OUTPATIENT REHABILITATION  (COMPLETE FOR INITIAL CLAIMS ONLY)  Patient's Last Name, First Name, M.I.  YOB: 1945  Patty Zambrano    Provider s Name:  [facility]   Medical Record No.  [id number]   Start of Care Date:  05/17/21   Onset Date:  (P) 04/26/21   Type:     _X__PT   ___OT   ___SLP Medical Diagnosis:  (P) back pain     PT Diagnosis:  LBP with radicular symptoms   Visits from SOC:  1      _________________________________________________________________________________  Plan of Treatment/Functional Goals:  manual therapy, neuromuscular re-education, ROM, strengthening, stretching (body mechanics)      Goals  Goal Identifier: 1.  Goal Description: Pt will be able to stand X 15 min for household tasks w/ pain no > 3/10  Target Date: 07/06/21    Goal Identifier: 2.  Goal Description: Pt will report increased ease getting to sleep and wake no > 4X/wk due to symptoms  Target Date: 07/06/21    Goal Identifier: 3.  Goal Description: Pt will be independent in HEP, have increased awareness of body mechanics and return to walking 3X/wk for exercise  Target Date: 07/06/21    Therapy Frequency:  1 time/week  Predicted Duration of Therapy Intervention:  4 weeks then 1 X in 2 weeks = 5 visits    Yolis Guzman, PT                 I CERTIFY THE NEED FOR THESE SERVICES FURNISHED UNDER        THIS PLAN OF TREATMENT AND WHILE UNDER MY CARE .             Physician Signature               Date    X_____________________________________________________                             Certification Date From:  (P) 05/17/21   Certification Date To:  (P) 07/06/21    Referring Provider:  Wiliam Serra NP     Initial Assessment        See Epic Evaluation Start of Care Date: 05/17/21

## 2021-09-12 ENCOUNTER — HEALTH MAINTENANCE LETTER (OUTPATIENT)
Age: 76
End: 2021-09-12

## 2021-10-08 ENCOUNTER — OFFICE VISIT (OUTPATIENT)
Dept: FAMILY MEDICINE | Facility: CLINIC | Age: 76
End: 2021-10-08
Payer: COMMERCIAL

## 2021-10-08 VITALS
WEIGHT: 153 LBS | BODY MASS INDEX: 24.59 KG/M2 | DIASTOLIC BLOOD PRESSURE: 80 MMHG | HEIGHT: 66 IN | OXYGEN SATURATION: 98 % | HEART RATE: 89 BPM | SYSTOLIC BLOOD PRESSURE: 124 MMHG | TEMPERATURE: 97.5 F

## 2021-10-08 DIAGNOSIS — B02.21 RAMSAY HUNT AURICULAR SYNDROME: ICD-10-CM

## 2021-10-08 DIAGNOSIS — M48.061 SPINAL STENOSIS OF LUMBAR REGION, UNSPECIFIED WHETHER NEUROGENIC CLAUDICATION PRESENT: ICD-10-CM

## 2021-10-08 DIAGNOSIS — Z23 ENCOUNTER FOR IMMUNIZATION: ICD-10-CM

## 2021-10-08 DIAGNOSIS — M85.851 OSTEOPENIA OF RIGHT HIP: ICD-10-CM

## 2021-10-08 DIAGNOSIS — F33.8 SEASONAL AFFECTIVE DISORDER (H): ICD-10-CM

## 2021-10-08 DIAGNOSIS — Z00.00 ENCOUNTER FOR MEDICARE ANNUAL WELLNESS EXAM: Primary | ICD-10-CM

## 2021-10-08 DIAGNOSIS — N18.31 STAGE 3A CHRONIC KIDNEY DISEASE (H): ICD-10-CM

## 2021-10-08 LAB
ANION GAP SERPL CALCULATED.3IONS-SCNC: 3 MMOL/L (ref 3–14)
BUN SERPL-MCNC: 18 MG/DL (ref 7–30)
CALCIUM SERPL-MCNC: 9 MG/DL (ref 8.5–10.1)
CHLORIDE BLD-SCNC: 108 MMOL/L (ref 94–109)
CO2 SERPL-SCNC: 28 MMOL/L (ref 20–32)
CREAT SERPL-MCNC: 0.79 MG/DL (ref 0.52–1.04)
GFR SERPL CREATININE-BSD FRML MDRD: 74 ML/MIN/1.73M2
GLUCOSE BLD-MCNC: 91 MG/DL (ref 70–99)
POTASSIUM BLD-SCNC: 4.2 MMOL/L (ref 3.4–5.3)
SODIUM SERPL-SCNC: 139 MMOL/L (ref 133–144)

## 2021-10-08 PROCEDURE — 36415 COLL VENOUS BLD VENIPUNCTURE: CPT | Performed by: INTERNAL MEDICINE

## 2021-10-08 PROCEDURE — 99397 PER PM REEVAL EST PAT 65+ YR: CPT | Mod: 25 | Performed by: INTERNAL MEDICINE

## 2021-10-08 PROCEDURE — 99214 OFFICE O/P EST MOD 30 MIN: CPT | Mod: 25 | Performed by: INTERNAL MEDICINE

## 2021-10-08 PROCEDURE — 90714 TD VACC NO PRESV 7 YRS+ IM: CPT | Performed by: INTERNAL MEDICINE

## 2021-10-08 PROCEDURE — 80048 BASIC METABOLIC PNL TOTAL CA: CPT | Performed by: INTERNAL MEDICINE

## 2021-10-08 PROCEDURE — 90471 IMMUNIZATION ADMIN: CPT | Performed by: INTERNAL MEDICINE

## 2021-10-08 RX ORDER — SERTRALINE HYDROCHLORIDE 25 MG/1
25 TABLET, FILM COATED ORAL DAILY
Qty: 90 TABLET | Refills: 3 | Status: SHIPPED | OUTPATIENT
Start: 2021-10-08 | End: 2022-10-11

## 2021-10-08 RX ORDER — COVID-19 ANTIGEN TEST
220 KIT MISCELLANEOUS PRN
COMMUNITY
Start: 2021-07-14

## 2021-10-08 RX ORDER — GABAPENTIN 300 MG/1
1 CAPSULE ORAL
COMMUNITY
Start: 2021-07-14

## 2021-10-08 ASSESSMENT — ENCOUNTER SYMPTOMS
SHORTNESS OF BREATH: 0
CHILLS: 0
SORE THROAT: 0
DYSURIA: 0
NAUSEA: 0
DIARRHEA: 0
FREQUENCY: 0
NERVOUS/ANXIOUS: 0
WEAKNESS: 0
FEVER: 0
BREAST MASS: 0
ARTHRALGIAS: 1
HEMATURIA: 0
CONSTIPATION: 0
HEMATOCHEZIA: 0
MYALGIAS: 0
PARESTHESIAS: 0
PALPITATIONS: 0
HEADACHES: 0
DIZZINESS: 0
HEARTBURN: 0
COUGH: 0
ABDOMINAL PAIN: 0
EYE PAIN: 0

## 2021-10-08 ASSESSMENT — PATIENT HEALTH QUESTIONNAIRE - PHQ9
5. POOR APPETITE OR OVEREATING: SEVERAL DAYS
SUM OF ALL RESPONSES TO PHQ QUESTIONS 1-9: 0

## 2021-10-08 ASSESSMENT — ACTIVITIES OF DAILY LIVING (ADL): CURRENT_FUNCTION: NO ASSISTANCE NEEDED

## 2021-10-08 ASSESSMENT — ANXIETY QUESTIONNAIRES
7. FEELING AFRAID AS IF SOMETHING AWFUL MIGHT HAPPEN: NOT AT ALL
3. WORRYING TOO MUCH ABOUT DIFFERENT THINGS: SEVERAL DAYS
2. NOT BEING ABLE TO STOP OR CONTROL WORRYING: SEVERAL DAYS
GAD7 TOTAL SCORE: 6
1. FEELING NERVOUS, ANXIOUS, OR ON EDGE: SEVERAL DAYS
5. BEING SO RESTLESS THAT IT IS HARD TO SIT STILL: SEVERAL DAYS
IF YOU CHECKED OFF ANY PROBLEMS ON THIS QUESTIONNAIRE, HOW DIFFICULT HAVE THESE PROBLEMS MADE IT FOR YOU TO DO YOUR WORK, TAKE CARE OF THINGS AT HOME, OR GET ALONG WITH OTHER PEOPLE: SOMEWHAT DIFFICULT
6. BECOMING EASILY ANNOYED OR IRRITABLE: SEVERAL DAYS

## 2021-10-08 ASSESSMENT — MIFFLIN-ST. JEOR: SCORE: 1210.75

## 2021-10-08 NOTE — PATIENT INSTRUCTIONS
Patient Education   Personalized Prevention Plan  You are due for the preventive services outlined below.  Your care team is available to assist you in scheduling these services.  If you have already completed any of these items, please share that information with your care team to update in your medical record.  Health Maintenance Due   Topic Date Due     ANNUAL REVIEW OF HM ORDERS  Never done     Depression Assessment  12/08/2020     Diptheria Tetanus Pertussis (DTAP/TDAP/TD) Vaccine (3 - Td or Tdap) 05/31/2021     FALL RISK ASSESSMENT  09/22/2021     Annual Wellness Visit  09/22/2021        Patient Education   Personalized Prevention Plan  You are due for the preventive services outlined below.  Your care team is available to assist you in scheduling these services.  If you have already completed any of these items, please share that information with your care team to update in your medical record.  Health Maintenance Due   Topic Date Due     ANNUAL REVIEW OF HM ORDERS  Never done     Depression Assessment  12/08/2020     Diptheria Tetanus Pertussis (DTAP/TDAP/TD) Vaccine (3 - Td or Tdap) 05/31/2021     FALL RISK ASSESSMENT  09/22/2021       Signs of Hearing Loss      Hearing much better with one ear can be a sign of hearing loss.   Hearing loss is a problem shared by many people. In fact, it is one of the most common health problems, particularly as people age. Most people age 65 and older have some hearing loss. By age 80, almost everyone does. Hearing loss often occurs slowly over the years. So you may not realize your hearing has gotten worse.  Have your hearing checked  Call your healthcare provider if you:    Have to strain to hear normal conversation    Have to watch other people s faces very carefully to follow what they re saying    Need to ask people to repeat what they ve said    Often misunderstand what people are saying    Turn the volume of the television or radio up so high that others  complain    Feel that people are mumbling when they re talking to you    Find that the effort to hear leaves you feeling tired and irritated    Notice, when using the phone, that you hear better with one ear than the other  [employer] last reviewed this educational content on 1/1/2020 2000-2021 [employer]. All rights reserved. This information is not intended as a substitute for professional medical care. Always follow your healthcare professional's instructions.              Kidneys:  1. Will monitor yearly  2. Avoid other NSAIDs besides the aleve you currently take.  Tylenol is OK  3.stay hydrated    Seasonal Affective Disorder:  1. Refill of sertraline  2. Discussed light box therapy, declined for now    Osteopenia:  1. Recommend updated DEXA.  Radiology test was ordered.  Please call [phone] to schedule.

## 2021-10-08 NOTE — PROGRESS NOTES
"SUBJECTIVE:   Patty Zambrano is a 76 year old female who presents for Preventive Visit.      Patient has been advised of split billing requirements and indicates understanding: Yes   Are you in the first 12 months of your Medicare coverage?  No    Healthy Habits:     In general, how would you rate your overall health?  Good    Frequency of exercise:  4-5 days/week    Duration of exercise:  30-45 minutes    Do you usually eat at least 4 servings of fruit and vegetables a day, include whole grains    & fiber and avoid regularly eating high fat or \"junk\" foods?  Yes    Taking medications regularly:  Yes    Medication side effects:  None    Ability to successfully perform activities of daily living:  No assistance needed    Home Safety:  Lack of grab bars in the bathroom    Hearing Impairment:  Difficulty understanding soft or whispered speech    In the past 6 months, have you been bothered by leaking of urine?  No    In general, how would you rate your overall mental or emotional health?  Good      PHQ-2 Total Score: 0    Additional concerns today:  No    Do you feel safe in your environment? Yes    Have you ever done Advance Care Planning? (For example, a Health Directive, POLST, or a discussion with a medical provider or your loved ones about your wishes): Yes, patient states has an Advance Care Planning document and will bring a copy to the clinic.  She is currently working on this.       Fall risk  Fallen 2 or more times in the past year?: No  Any fall with injury in the past year?: No    Cognitive Screening   1) Repeat 3 items (Leader, Season, Table)    2) Clock draw: NORMAL  3) 3 item recall: Recalls 3 objects  Results: 3 items recalled: COGNITIVE IMPAIRMENT LESS LIKELY    Mini-CogTM Copyright ARLYN Gutierrez. Licensed by the author for use in [facility]; reprinted with permission (sonia@.[facility]). All rights reserved.      Do you have sleep apnea, excessive snoring or daytime drowsiness?: no    Reviewed and " updated as needed this visit by clinical staff  Tobacco  Allergies  Meds  Problems  Med Hx  Surg Hx  Fam Hx  Soc Hx          Reviewed and updated as needed this visit by Provider   Allergies  Meds  Problems            Social History     Tobacco Use     Smoking status: Never Smoker     Smokeless tobacco: Never Used   Substance Use Topics     Alcohol use: Yes     Comment: rare     If you drink alcohol do you typically have >3 drinks per day or >7 drinks per week? No    Alcohol Use 10/8/2021   Prescreen: >3 drinks/day or >7 drinks/week? No   Prescreen: >3 drinks/day or >7 drinks/week? -       Chief Complaint   Patient presents with     Physical     Wellness physical exam.     Kidney Problem     She has a question regarding her chronic kidney disease diagnosis.     Imm/Inj     She is due to update her tetanus injection.     Depression     Recheck on medication.  Has not been taking the medication over the summer.  Feels she needs to start again for seasonal affective.     Blood Draw     Patient is fasting today.       Spinal Stenosis: going to [facility]; started gabapentin 300 TID, Aleve regularly.  Finds it is helpful.  Did physical therapy, ongoing Hepatitis  --had 3 epidural injection; somewhat helpful  --plans to put off surgery  --has been more active than years past - golfing, hiking.  Feels pain is better controlled with gabapentin    Osteopenia:  --mother had osteoporosis  And was on med.  No history of hip fx for mother.    Depression Followup  Seasonal affective disorder    How are you doing with your depression since your last visit? Has been off of the medication for the summer.  Feels she is ready to start again.    Are you having other symptoms that might be associated with depression? No    Have you had a significant life event?  No     Are you feeling anxious or having panic attacks?   No    Do you have any concerns with your use of alcohol or other drugs? No     Has not tried light therapy    Social  History     Tobacco Use     Smoking status: Never Smoker     Smokeless tobacco: Never Used   Vaping Use     Vaping Use: Never used   Substance Use Topics     Alcohol use: Yes     Comment: rare     Drug use: No     PHQ 7/12/2017 9/17/2019 6/8/2020   PHQ-9 Total Score 3 3 1   Q9: Thoughts of better off dead/self-harm past 2 weeks Not at all Not at all Not at all     SONYA-7 SCORE 7/12/2017   Total Score 3     Last PHQ-9 6/8/2020   1.  Little interest or pleasure in doing things 0   2.  Feeling down, depressed, or hopeless 0   3.  Trouble falling or staying asleep, or sleeping too much 1   4.  Feeling tired or having little energy 0   5.  Poor appetite or overeating 0   6.  Feeling bad about yourself 0   7.  Trouble concentrating 0   8.  Moving slowly or restless 0   Q9: Thoughts of better off dead/self-harm past 2 weeks 0   PHQ-9 Total Score 1   Difficulty at work, home, or with people Not difficult at all     SONYA-7  7/12/2017   1. Feeling nervous, anxious, or on edge 0   2. Not being able to stop or control worrying 0   3. Worrying too much about different things 1   4. Trouble relaxing 1   5. Being so restless that it is hard to sit still 0   6. Becoming easily annoyed or irritable 1   7. Feeling afraid, as if something awful might happen 0   SONYA-7 Total Score 3   If you checked any problems, how difficult have they made it for you to do your work, take care of things at home, or get along with other people? Somewhat difficult       Suicide Assessment Five-step Evaluation and Treatment (SAFE-T)      Current providers sharing in care for this patient include:   Patient Care Team:  Jenny Guzman DO as PCP - General (Internal Medicine)  Jenny Guzman DO as Assigned PCP    The following health maintenance items are reviewed in Epic and correct as of today:  Health Maintenance Due   Topic Date Due     ANNUAL REVIEW OF HM ORDERS  Never done     PHQ-9  12/08/2020     DTAP/TDAP/TD IMMUNIZATION (3 - Td or Tdap)  05/31/2021     FALL RISK ASSESSMENT  09/22/2021     Current Outpatient Medications   Medication Sig Dispense Refill     ASPIRIN 81 MG OR TABS 1 TABLET DAILY       CALCIUM + D 600-200 MG-UNIT OR TABS 2 TABLET DAILY       Cholecalciferol (VITAMIN D) 2000 UNITS tablet Take 2,000 Units by mouth daily 100 tablet 3     cycloSPORINE (RESTASIS) 0.05 % ophthalmic emulsion Place 1 drop into both eyes 2 times daily       FISH OIL 1,500 mg. Daily       gabapentin (NEURONTIN) 300 MG capsule Take 1 capsule by mouth 3 times daily       GLUCOSAMINE CHONDRO COMPLEX 500-400 MG OR TABS 3 tablets by mouth daily       MELATONIN PO Take 3 mg by mouth At Bedtime        METAMUCIL OR Take 1 teaspoonful once daily       minoxidil (ROGAINE) 5 % external solution Apply topically 2 times daily 2%       MULTIVITAMIN OR Take 1 tablet by mouth daily       naproxen sodium 220 MG capsule Take 440 mg by mouth 2 times daily       Probiotic Product (PROBIOTIC ACIDOPHILUS) CAPS Take 1 capsule by mouth daily        sertraline (ZOLOFT) 25 MG tablet Take 1 tablet (25 mg) by mouth daily 90 tablet 3             Review of Systems   Constitutional: Negative for chills and fever.   HENT: Positive for hearing loss. Negative for congestion, ear pain and sore throat.    Eyes: Negative for pain and visual disturbance.   Respiratory: Negative for cough and shortness of breath.    Cardiovascular: Negative for chest pain and palpitations.   Gastrointestinal: Negative for abdominal pain, constipation, diarrhea, heartburn, hematochezia and nausea.   Breasts:  Negative for tenderness, breast mass and discharge.   Genitourinary: Negative for dysuria, frequency, hematuria, pelvic pain, urgency, vaginal bleeding and vaginal discharge.   Musculoskeletal: Positive for arthralgias. Negative for myalgias.   Skin: Negative for rash.   Neurological: Negative for dizziness, weakness, headaches and paresthesias.   Psychiatric/Behavioral: Positive for mood changes. The patient is  "not nervous/anxious.      Constitutional, HEENT, cardiovascular, pulmonary, GI, , musculoskeletal, neuro, skin, endocrine and psych systems are negative, except as otherwise noted.    OBJECTIVE:   /80   Pulse 89   Temp 97.5  F (36.4  C) (Tympanic)   Ht 1.676 m (5' 6\")   Wt 69.4 kg (153 lb)   SpO2 98%   BMI 24.69 kg/m   Estimated body mass index is 24.69 kg/m  as calculated from the following:    Height as of this encounter: 1.676 m (5' 6\").    Weight as of this encounter: 69.4 kg (153 lb).  Physical Exam  GENERAL: healthy, alert and no distress  EYES: Eyes grossly normal to inspection, PERRL and conjunctivae and sclerae normal  HENT: ear canals and TM's normal, nose and mouth without ulcers or lesions  NECK: no adenopathy, no asymmetry, masses, or scars and thyroid normal to palpation  RESP: lungs clear to auscultation - no rales, rhonchi or wheezes  CV: regular rate and rhythm, normal S1 S2, no S3 or S4, no murmur, click or rub, no peripheral edema and peripheral pulses strong  ABDOMEN: soft, nontender, no hepatosplenomegaly, no masses and bowel sounds normal  MS: no gross musculoskeletal defects noted, no edema  SKIN: no suspicious lesions or rashes  NEURO: Normal strength and tone, mentation intact and speech normal  PSYCH: mentation appears normal, affect normal/bright      ASSESSMENT / PLAN:   (Z00.00) Encounter for Medicare annual wellness exam  (primary encounter diagnosis)  Comment:  Plan:    (F33.8) Seasonal affective disorder (H)  Comment:   Plan: sertraline (ZOLOFT) 25 MG tablet, OFFICE/OUTPT         VISIT,EST,LEVL IV         - stable, refill provided.  Declines a light box for now    (N18.31) Stage 3a chronic kidney disease (H)  Comment:  Plan: Basic metabolic panel  (Ca, Cl, CO2, Creat,         Gluc, K, Na, BUN), OFFICE/OUTPT VISIT,EST,LEVL         IV        --Monitor yearly.  Benefit of NSAIDs outweigh the risk at this point.  Discussed safe use of NSAIDs and staying hydrated    (M48.061) " "Spinal stenosis of lumbar region, unspecified whether neurogenic claudication present  Comment:   Plan: OFFICE/OUTPT VISIT,EST,LEVL IV        --Following [facility].  On gabapentin NSAIDs    (M85.851) Osteopenia of right hip  Comment:   Plan: DX Hip/Pelvis/Spine, OFFICE/OUTPT         VISIT,EST,LEVL IV        --Mild osteopenia of the right hip seen in 2013.  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  Plan:     (Z23) Encounter for immunization  Comment:   Plan: TD PRESERV FREE, IM (7+ YRS) (DECAVAC/TENIVAC)              Patient has been advised of split billing requirements and indicates understanding: Yes  COUNSELING:  Reviewed preventive health counseling, as reflected in patient instructions    Estimated body mass index is 24.69 kg/m  as calculated from the following:    Height as of this encounter: 1.676 m (5' 6\").    Weight as of this encounter: 69.4 kg (153 lb).        She reports that she has never smoked. She has never used smokeless tobacco.      Appropriate preventive services were discussed with this patient, including applicable screening as appropriate for cardiovascular disease, diabetes, osteopenia/osteoporosis, and glaucoma.  As appropriate for age/gender, discussed screening for colorectal cancer, prostate cancer, breast cancer, and cervical cancer. Checklist reviewing preventive services available has been given to the patient.    Reviewed patients plan of care and provided an AVS. The Basic Care Plan (routine screening as documented in Health Maintenance) for Patty meets the Care Plan requirement. This Care Plan has been established and reviewed with the Patient.    Counseling Resources:  ATP IV Guidelines  Pooled Cohorts Equation Calculator  Breast Cancer Risk Calculator  Breast Cancer: Medication to Reduce Risk  FRAX Risk " Assessment  ICSI Preventive Guidelines  Dietary Guidelines for Americans, 2010  USDA's MyPlate  ASA Prophylaxis  Lung CA Screening    Jenny Guzman DO  [facility]    Identified Health Risks:    The patient was provided with written information regarding signs of hearing loss.

## 2021-10-08 NOTE — PROGRESS NOTES
Prior to immunization administration, verified patients identity using patient s name and date of birth. Please see Immunization Activity for additional information.     Screening Questionnaire for Adult Immunization    Are you sick today?   No   Do you have allergies to medications, food, a vaccine component or latex?   No   Have you ever had a serious reaction after receiving a vaccination?   No   Do you have a long-term health problem with heart, lung, kidney, or metabolic disease (e.g., diabetes), asthma, a blood disorder, no spleen, complement component deficiency, a cochlear implant, or a spinal fluid leak?  Are you on long-term aspirin therapy?   No   Do you have cancer, leukemia, HIV/AIDS, or any other immune system problem?   No   Do you have a parent, brother, or sister with an immune system problem?   No   In the past 3 months, have you taken medications that affect  your immune system, such as prednisone, other steroids, or anticancer drugs; drugs for the treatment of rheumatoid arthritis, Crohn s disease, or psoriasis; or have you had radiation treatments?   No   Have you had a seizure, or a brain or other nervous system problem?   No   During the past year, have you received a transfusion of blood or blood    products, or been given immune (gamma) globulin or antiviral drug?   No   For women: Are you pregnant or is there a chance you could become       pregnant during the next month?   No   Have you received any vaccinations in the past 4 weeks?   No     Immunization questionnaire answers were all negative.        Per orders of Dr. Guzman, injection of TD given by Julissa Suazo CMA. Patient instructed to remain in clinic for 15 minutes afterwards, and to report any adverse reaction to me immediately.       Screening performed by Julissa Suazo CMA on 10/8/2021 at 9:15 AM.

## 2021-10-09 ASSESSMENT — ANXIETY QUESTIONNAIRES: GAD7 TOTAL SCORE: 6

## 2021-10-28 ENCOUNTER — HOSPITAL ENCOUNTER (OUTPATIENT)
Dept: BONE DENSITY | Facility: CLINIC | Age: 76
Discharge: HOME | End: 2021-10-28
Attending: INTERNAL MEDICINE | Admitting: INTERNAL MEDICINE
Payer: COMMERCIAL

## 2021-10-28 DIAGNOSIS — M85.851 OSTEOPENIA OF RIGHT HIP: ICD-10-CM

## 2021-10-28 PROCEDURE — 77080 DXA BONE DENSITY AXIAL: CPT

## 2021-11-05 ENCOUNTER — HOSPITAL ENCOUNTER (OUTPATIENT)
Dept: MAMMOGRAPHY | Facility: CLINIC | Age: 76
Discharge: HOME | End: 2021-11-05
Attending: INTERNAL MEDICINE | Admitting: INTERNAL MEDICINE
Payer: COMMERCIAL

## 2021-11-05 DIAGNOSIS — Z12.31 VISIT FOR SCREENING MAMMOGRAM: ICD-10-CM

## 2021-11-05 PROCEDURE — 77063 BREAST TOMOSYNTHESIS BI: CPT

## 2021-11-17 ENCOUNTER — TELEPHONE (OUTPATIENT)
Dept: FAMILY MEDICINE | Facility: CLINIC | Age: 76
End: 2021-11-17
Payer: COMMERCIAL

## 2021-11-17 NOTE — TELEPHONE ENCOUNTER
Reason for call:    Symptom or request:     Patient called with concerns about hand pain.           Best Time:  any    Can we leave a detailed message on this number?  YES     Jaimie VELAZQUEZ  Station

## 2021-11-17 NOTE — TELEPHONE ENCOUNTER
Pt states has had left hand numbness, pain and tingling, worse at night for 2-3 weeks. States is exactly the same as had in 2003 with her thoracic outlet syndrome. She did some p.t.and it improved until now. Now same symptoms in same hand. No weakness. No other symptoms. Denies cp. Pt had scheduled appt in end of dec. Changed appt to virtual for Monday. Advised to go to ed if any color changes or worsening.     Jose Waddell, RN

## 2021-11-22 ENCOUNTER — VIRTUAL VISIT (OUTPATIENT)
Dept: FAMILY MEDICINE | Facility: CLINIC | Age: 76
End: 2021-11-22
Payer: COMMERCIAL

## 2021-11-22 DIAGNOSIS — G56.02 CARPAL TUNNEL SYNDROME OF LEFT WRIST: Primary | ICD-10-CM

## 2021-11-22 PROCEDURE — 99213 OFFICE O/P EST LOW 20 MIN: CPT | Mod: 95 | Performed by: INTERNAL MEDICINE

## 2021-11-22 NOTE — PROGRESS NOTES
Patty is a 76 year old who is being evaluated via a billable video visit.      How would you like to obtain your AVS? MyChart  If the video visit is dropped, the invitation should be resent by: Text to cell phone: [phone]  Will anyone else be joining your video visit? No    Video Start Time: 1:33 PM    Assessment & Plan     Carpal tunnel syndrome of left wrist    Patty presents with a few weeks of pain and numbness and tingling in the left 1st-4th fingers with positive Tinel (though negative Phalen) maneuver on self-exam, suggesting likely carpal tunnel syndrome.  She also has a history of thoracic outlet syndrome with similar symptoms, so that would be another consideration, but movements of the neck and shoulder did not worsen symptoms.  We'll start by trying a carpal tunnel wrist brace for a few weeks at night and with repetitive activities.  If not improving, we can consider EMG and/or try PT next.  Can call for order as needed if not improving.      - Wrist/Arm/Hand Supplies Order for DME - ONLY FOR DME       Sials Cortez MD  [facility]   Patty is a 76 year old who presents for the following health issues     History of Present Illness       She eats 4 or more servings of fruits and vegetables daily.She consumes 0 sweetened beverage(s) daily.She exercises with enough effort to increase her heart rate 30 to 60 minutes per day.  She exercises with enough effort to increase her heart rate 5 days per week.   She is taking medications regularly.       Concern - Left Hand  Onset: 3-4 weeks  Description: numbness, tingling, only left hand (not arm) primarily in thumb and first three fingers (not pinky); night is worse - hand becomes painful, numb.  Does not happen every night, however, when it does, it wakes patient up.  Intensity: moderate  Progression of Symptoms:  worsening  Accompanying Signs & Symptoms:  Weaker  golfing noted over the summer, but no recent  changes  Previous history of similar problem: Thoracic outlet syndrome history on the left; very similar.  Symptoms improved with PT, she did not require surgery.   Precipitating factors:        Worsened by: laying down, or sitting in a particular position.  Alleviating factors:        Improved by: nothing  Therapies tried and outcome: Started doing at home exercises to see if anything help- back and shoulder  Has been on gabapentin for back pain for awhile, isn't sure if this is helping  Also takes Aleve BID and this may be helping more        Review of Systems   Constitutional, HEENT, cardiovascular, pulmonary, gi and gu systems are negative, except as otherwise noted.      Objective           Vitals:  No vitals were obtained today due to virtual visit.    Physical Exam   GENERAL: Healthy, alert and no distress  EYES: Eyes grossly normal to inspection.  No discharge or erythema, or obvious scleral/conjunctival abnormalities.  RESP: No audible wheeze, cough, or visible cyanosis.  No visible retractions or increased work of breathing.    SKIN: Visible skin clear. No significant rash, abnormal pigmentation or lesions.  NEURO: positive Tinel but negative Phalen.  Tilting and rotating head and abducting the arm did not worsen symptoms  PSYCH: Mentation appears normal, affect normal/bright, judgement and insight intact, normal speech and appearance well-groomed.                Video-Visit Details    Type of service:  Video Visit    Video End Time:1:45 PM    Originating Location (pt. Location): Home    Distant Location (provider location):  Home    Platform used for Video Visit: Lendio      Answers for HPI/ROS submitted by the patient on 11/19/2021  How many servings of fruits and vegetables do you eat daily?: 4 or more  On average, how many sweetened beverages do you drink each day (Examples: soda, juice, sweet tea, etc.  Do NOT count diet or artificially sweetened beverages)?: 0  How many minutes a day do you exercise  enough to make your heart beat faster?: 30 to 60  How many days a week do you exercise enough to make your heart beat faster?: 5  How many days per week do you miss taking your medication?: 0

## 2021-11-22 NOTE — PATIENT INSTRUCTIONS
Try the carpal tunnel wrist brace at night and with repetitive activities for a few weeks to see if that helps.  If not improving and you either want to try the physical therapy or do the EMG nerve conduction study, just let me know and I can place a referral.       Patient Education     Carpal Tunnel Syndrome    Carpal tunnel syndrome is a painful condition of the wrist and arm. It is caused by pressure on the median nerve. The median nerve is one of the nerves that give feeling and movement to the hand. It passes through a tunnel in the wrist called the carpal tunnel. This tunnel is made up of bones and ligaments. Narrowing of this tunnel or swelling of the tissues inside the tunnel puts pressure on the median nerve. This causes numbness, pins and needles, or electric shooting pains in your hand and forearm. Often the pain is worse at night and may wake you when you are asleep.  Carpal tunnel syndrome may occur during pregnancy and with use of birth control pills. It is more common in workers who must often bend their wrists. It is also common in people who work with power tools that cause strong vibrations.  Home care    Rest the painful wrist. Avoid repeated bending of the wrist back and forth. This puts pressure on the median nerve. Avoid using power tools with strong vibrations.    If you were given a splint, wear it at night while you sleep. You may also wear it during the day for comfort.    Move your fingers and wrists often to prevent stiffness.    Elevate your arms on pillows when you lie down.    Try using the unaffected hand more.    Try not to hold your wrists in a bent, downward position.    Sometimes changes in the work place may ease symptoms. If you type most of the day, it may help to change the position of your keyboard or add a wrist support. Your wrist should be in a neutral position and not bent back when typing.    You may use over-the-counter pain medicine to treat pain and inflammation,  unless another medicine was prescribed. Anti-inflammatory pain medicines, such as ibuprofen or naproxen may be more effective than acetaminophen, which treats pain, but not inflammation. If you have chronic liver or kidney disease or ever had a stomach ulcer or gastrointestinal bleeding, talk with your healthcare provider before using these medicines.    Opioid pain medicine will only give temporary relief and does not treat the problem. If pain continues, you may need a shot of a steroid drug into your wrist.    If the above methods fail, you may need surgery. This will open the carpal tunnel and release the pressure on the trapped nerve.  Follow-up care  Follow up with your healthcare provider, or as advised. If X-rays were taken, you will be notified of any new findings that may affect your care.  When to seek medical advice  Call your healthcare provider right away if any of these occur:    Pain not improving with the above treatment    Fingers or hand become cold, blue, numb, or tingly    Your whole arm becomes swollen or weak  Cindi last reviewed this educational content on 5/1/2018 2000-2021 [employer]. All rights reserved. This information is not intended as a substitute for professional medical care. Always follow your healthcare professional's instructions.

## 2022-01-10 ENCOUNTER — TRANSFERRED RECORDS (OUTPATIENT)
Dept: HEALTH INFORMATION MANAGEMENT | Facility: CLINIC | Age: 77
End: 2022-01-10
Payer: COMMERCIAL

## 2022-03-16 ENCOUNTER — TRANSFERRED RECORDS (OUTPATIENT)
Dept: HEALTH INFORMATION MANAGEMENT | Facility: CLINIC | Age: 77
End: 2022-03-16
Payer: COMMERCIAL

## 2022-03-18 ENCOUNTER — OFFICE VISIT (OUTPATIENT)
Dept: FAMILY MEDICINE | Facility: CLINIC | Age: 77
End: 2022-03-18
Payer: COMMERCIAL

## 2022-03-18 VITALS
HEIGHT: 67 IN | WEIGHT: 152.9 LBS | OXYGEN SATURATION: 99 % | RESPIRATION RATE: 20 BRPM | DIASTOLIC BLOOD PRESSURE: 70 MMHG | TEMPERATURE: 97 F | HEART RATE: 92 BPM | BODY MASS INDEX: 24 KG/M2 | SYSTOLIC BLOOD PRESSURE: 118 MMHG

## 2022-03-18 DIAGNOSIS — F33.8 SEASONAL AFFECTIVE DISORDER (H): ICD-10-CM

## 2022-03-18 DIAGNOSIS — Z01.818 PREOP GENERAL PHYSICAL EXAM: Primary | ICD-10-CM

## 2022-03-18 DIAGNOSIS — M85.851 OSTEOPENIA OF BOTH HIPS: ICD-10-CM

## 2022-03-18 DIAGNOSIS — Z13.220 SCREENING FOR HYPERLIPIDEMIA: ICD-10-CM

## 2022-03-18 DIAGNOSIS — N18.31 STAGE 3A CHRONIC KIDNEY DISEASE (H): ICD-10-CM

## 2022-03-18 DIAGNOSIS — M85.852 OSTEOPENIA OF BOTH HIPS: ICD-10-CM

## 2022-03-18 DIAGNOSIS — B02.21 RAMSAY HUNT AURICULAR SYNDROME: ICD-10-CM

## 2022-03-18 DIAGNOSIS — G56.02 CARPAL TUNNEL SYNDROME OF LEFT WRIST: ICD-10-CM

## 2022-03-18 PROBLEM — F34.1 DYSTHYMIA: Status: RESOLVED | Noted: 2017-07-12 | Resolved: 2022-03-18

## 2022-03-18 LAB
ANION GAP SERPL CALCULATED.3IONS-SCNC: 4 MMOL/L (ref 3–14)
BUN SERPL-MCNC: 16 MG/DL (ref 7–30)
CALCIUM SERPL-MCNC: 9.4 MG/DL (ref 8.5–10.1)
CHLORIDE BLD-SCNC: 105 MMOL/L (ref 94–109)
CHOLEST SERPL-MCNC: 221 MG/DL
CO2 SERPL-SCNC: 28 MMOL/L (ref 20–32)
CREAT SERPL-MCNC: 0.85 MG/DL (ref 0.52–1.04)
CREAT UR-MCNC: 93 MG/DL
FASTING STATUS PATIENT QL REPORTED: YES
GFR SERPL CREATININE-BSD FRML MDRD: 71 ML/MIN/1.73M2
GLUCOSE BLD-MCNC: 92 MG/DL (ref 70–99)
HDLC SERPL-MCNC: 74 MG/DL
HGB BLD-MCNC: 13.3 G/DL (ref 11.7–15.7)
LDLC SERPL CALC-MCNC: 128 MG/DL
MICROALBUMIN UR-MCNC: 6 MG/L
MICROALBUMIN/CREAT UR: 6.45 MG/G CR (ref 0–25)
NONHDLC SERPL-MCNC: 147 MG/DL
POTASSIUM BLD-SCNC: 4.5 MMOL/L (ref 3.4–5.3)
SODIUM SERPL-SCNC: 137 MMOL/L (ref 133–144)
TRIGL SERPL-MCNC: 96 MG/DL

## 2022-03-18 PROCEDURE — 80061 LIPID PANEL: CPT | Performed by: INTERNAL MEDICINE

## 2022-03-18 PROCEDURE — 99214 OFFICE O/P EST MOD 30 MIN: CPT | Performed by: INTERNAL MEDICINE

## 2022-03-18 PROCEDURE — 82043 UR ALBUMIN QUANTITATIVE: CPT | Performed by: INTERNAL MEDICINE

## 2022-03-18 PROCEDURE — 80048 BASIC METABOLIC PNL TOTAL CA: CPT | Performed by: INTERNAL MEDICINE

## 2022-03-18 PROCEDURE — 36415 COLL VENOUS BLD VENIPUNCTURE: CPT | Performed by: INTERNAL MEDICINE

## 2022-03-18 PROCEDURE — 85018 HEMOGLOBIN: CPT | Performed by: INTERNAL MEDICINE

## 2022-03-18 ASSESSMENT — PAIN SCALES - GENERAL: PAINLEVEL: MILD PAIN (2)

## 2022-03-18 NOTE — PATIENT INSTRUCTIONS
Preparing for Your Surgery  Getting started  A nurse will call you to review your health history and instructions. They will give you an arrival time based on your scheduled surgery time. Please be ready to share:    Your doctor's clinic name and phone number    Your medical, surgical and anesthesia history    A list of allergies and sensitivities    A list of medicines, including herbal treatments and over-the-counter drugs    Whether the patient has a legal guardian (ask how to send us the papers in advance)  Please tell us if you're pregnant--or if there's any chance you might be pregnant. Some surgeries may injure a fetus (unborn baby), so they require a pregnancy test. Surgeries that are safe for a fetus don't always need a test, and you can choose whether to have one.   If you have a child who's having surgery, please ask for a copy of Preparing for Your Child's Surgery.    Preparing for surgery    Within 30 days of surgery: Have a pre-op exam (sometimes called an H&P, or History and Physical). This can be done at a clinic or pre-operative center.  ? If you're having a , you may not need this exam. Talk to your care team.    At your pre-op exam, talk to your care team about all medicines you take. If you need to stop any medicines before surgery, ask when to start taking them again.  ? We do this for your safety. Many medicines can make you bleed too much during surgery. Some change how well surgery (anesthesia) drugs work.    Call your insurance company to let them know you're having surgery. (If you don't have insurance, call [phone].)    Call your clinic if there's any change in your health. This includes signs of a cold or flu (sore throat, runny nose, cough, rash, fever). It also includes a scrape or scratch near the surgery site.    If you have questions on the day of surgery, call your hospital or surgery center.  COVID testing  You may need to be tested for COVID-19 before having  surgery. If so, your surgical team will give you instructions for scheduling this test, separate from your preoperative history and physical.  Eating and drinking guidelines  For your safety: Unless your surgeon tells you otherwise, follow the guidelines below.    Eat and drink as usual until 8 hours before surgery. After that, no food or milk.    Drink clear liquids until 2 hours before surgery. These are liquids you can see through, like water, Gatorade and Propel Water. You may also have black coffee and tea (no cream or milk).    Nothing by mouth within 2 hours of surgery. This includes gum, candy and breath mints.    If you drink alcohol: Stop drinking it the night before surgery.    If your care team tells you to take medicine on the morning of surgery, it's okay to take it with a sip of water.  Preventing infection    Shower or bathe the night before and morning of your surgery. Follow the instructions your clinic gave you. (If no instructions, use regular soap.)    Don't shave or clip hair near your surgery site. We'll remove the hair if needed.    Don't smoke or vape the morning of surgery. You may chew nicotine gum up to 2 hours before surgery. A nicotine patch is okay.  ? Note: Some surgeries require you to completely quit smoking and nicotine. Check with your surgeon.    Your care team will make every effort to keep you safe from infection. We will:  ? Clean our hands often with soap and water (or an alcohol-based hand rub).  ? Clean the skin at your surgery site with a special soap that kills germs.  ? Give you a special gown to keep you warm. (Cold raises the risk of infection.)  ? Wear special hair covers, masks, gowns and gloves during surgery.  ? Give antibiotic medicine, if prescribed. Not all surgeries need antibiotics.  What to bring on the day of surgery    Photo ID and insurance card    Copy of your health care directive, if you have one    Glasses and hearing aides (bring cases)  ? You can't  wear contacts during surgery    Inhaler and eye drops, if you use them (tell us about these when you arrive)    CPAP machine or breathing device, if you use them    A few personal items, if spending the night    If you have . . .  ? A pacemaker, ICD (cardiac defibrillator) or other implant: Bring the ID card.  ? An implanted stimulator: Bring the remote control.  ? A legal guardian: Bring a copy of the certified (court-stamped) guardianship papers.  Please remove any jewelry, including body piercings. Leave jewelry and other valuables at home.  If you're going home the day of surgery    You must have a responsible adult drive you home. They should stay with you overnight as well.    If you don't have someone to stay with you, and you aren't safe to go home alone, we may keep you overnight. Insurance often won't pay for this.  After surgery  If it's hard to control your pain or you need more pain medicine, please call your surgeon's office.  Questions?   If you have any questions for your care team, list them here: _________________________________________________________________________________________________________________________________________________________________________ ____________________________________ ____________________________________ ____________________________________  For informational purposes only. Not to replace the advice of your health care provider. Copyright   2003, 2019 [facility]. All rights reserved. Clinically reviewed by Lolis Cameron MD. Leaky [id number] - REV 07/21.      1.  Stop aspirin now  2.  Stop your vitamins and supplements now  3.  Stop naproxen 2 days prior to surgery  4.  Okay to continue sertraline and gabapentin up through the day of surgery  5. Blood work today

## 2022-03-18 NOTE — PROGRESS NOTES
[facility]  [address] [facility] [address]  Phone: [phone]  Primary Provider: Negro Guzman  Pre-op Performing Provider: NEGRO GUZMAN      PREOPERATIVE EVALUATION:  Today's date: 3/18/2022    Patty Zmabrano is a 77 year old female who presents for a preoperative evaluation.    Surgical Information:  Surgery/Procedure: carpal Tunnel  Surgery Location: [facility]   Surgeon: Santos Roa  Surgery Date: 3/23  Time of Surgery: 1300  Where patient plans to recover: At home with family  Fax number for surgical facility: [phone]    Type of Anesthesia Anticipated: Local with MAC    Assessment & Plan     The proposed surgical procedure is considered INTERMEDIATE risk.    Problem List Items Addressed This Visit     Osteopenia of both hips    Criselda Paula auricular syndrome    Seasonal affective disorder (H)    Stage 3a chronic kidney disease (H)    Relevant Orders    Albumin Random Urine Quantitative with Creat Ratio    Hemoglobin    Basic metabolic panel  (Ca, Cl, CO2, Creat, Gluc, K, Na, BUN)      Other Visit Diagnoses     Preop general physical exam    -  Primary    Carpal tunnel syndrome of left wrist        Screening for hyperlipidemia        Relevant Orders    Lipid panel reflex to direct LDL Fasting               Risks and Recommendations:  The patient has the following additional risks and recommendations for perioperative complications:   - No identified additional risk factors other than previously addressed    Medication Instructions:   - aspirin: Discontinue aspirin 7-10 days prior to procedure to reduce bleeding risk. It should be resumed postoperatively.    - naproxen (Aleve, Naprosyn): HOLD 4 days before surgery.     RECOMMENDATION:  APPROVAL GIVEN to proceed with proposed procedure, without further diagnostic evaluation.                      Subjective     HPI related to upcoming procedure: carpal tunnel failing conservative management    Preop  Questions 3/17/2022   1. Have you ever had a heart attack or stroke? No   2. Have you ever had surgery on your heart or blood vessels, such as a stent placement, a coronary artery bypass, or surgery on an artery in your head, neck, heart, or legs? No   3. Do you have chest pain with activity? No   4. Do you have a history of  heart failure? No   5. Do you currently have a cold, bronchitis or symptoms of other infection? No   6. Do you have a cough, shortness of breath, or wheezing? No   7. Do you or anyone in your family have previous history of blood clots? No   8. Do you or does anyone in your family have a serious bleeding problem such as prolonged bleeding following surgeries or cuts? No   9. Have you ever had problems with anemia or been told to take iron pills? No   10. Have you had any abnormal blood loss such as black, tarry or bloody stools, or abnormal vaginal bleeding? No   11. Have you ever had a blood transfusion? No   12. Are you willing to have a blood transfusion if it is medically needed before, during, or after your surgery? Yes   13. Have you or any of your relatives ever had problems with anesthesia? No   14. Do you have sleep apnea, excessive snoring or daytime drowsiness? No   15. Do you have any artifical heart valves or other implanted medical devices like a pacemaker, defibrillator, or continuous glucose monitor? No   16. Do you have artificial joints? No   17. Are you allergic to latex? No   18. Is there any chance that you may be pregnant? -       Health Care Directive:  Patient does not have a Health Care Directive or Living Will: Discussed advance care planning with patient; information given to patient to review.    Preoperative Review of :   reviewed - no record of controlled substances prescribed.      Status of Chronic Conditions:  See problem list for active medical problems.  Problems all longstanding and stable, except as noted/documented.  See ROS for pertinent symptoms  related to these conditions.      Review of Systems  CONSTITUTIONAL: NEGATIVE for fever, chills, change in weight  ENT/MOUTH: NEGATIVE for ear, mouth and throat problems  RESP: NEGATIVE for significant cough or SOB  CV: NEGATIVE for chest pain, palpitations or peripheral edema    Patient Active Problem List    Diagnosis Date Noted     Seasonal affective disorder (H) 10/08/2021     Priority: Medium     Spinal stenosis of lumbar region 10/08/2021     Priority: Medium     Follows with [facility].  Did physical therapy 7/2021; gabapentin helpful.  3 JUS somewhat helpful.  Surgery offered, patient deferring    MRI 10/2020  1. Left foraminal disc herniation at L5-S1 contributing to severe left  foraminal stenosis at L5-S1.  2. Severe bilateral facet arthropathy at L4-L5 with grade 1  anterolisthesis of L4 on L5.       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  Declined hearing aide.  Chronic tinnitus        Stage 3a chronic kidney disease (H) 09/22/2020     Priority: Medium     Dysthymia 07/12/2017     Priority: Medium     Sigmoid diverticulitis 08/26/2016     Priority: Medium     Episode in 2006 or so, then 2015 and 2016, 12/2017.       Cataract 12/29/2015     Priority: Medium     HYPERLIPIDEMIA LDL GOAL <160 10/31/2010     Priority: Medium     Hypovitaminosis D 04/07/2010     Priority: Medium     Osteopenia of both hips 03/19/2009     Priority: Medium     Recommend follow-up 10/2023       Sinusitis, chronic 02/18/2008     Priority: Medium     MRI 2003 positive for sinus disease.  Problem list name updated by automated process. Provider to review       Postmenopausal atrophic vaginitis 01/15/2007     Priority: Medium      Past Medical History:   Diagnosis Date     Abnormal Papanicolaou smear of cervix and cervical HPV 1978    cryotherapy     Brachial neuritis or radiculitis NOS     thoracic outlet syndrome, left sided     Diverticulitis of colon (without  mention of hemorrhage)(562.11) 05/04    presumptive     Postmenopausal atrophic vaginitis      Raynaud's syndrome      Past Surgical History:   Procedure Laterality Date     COLONOSCOPY  4/3/2014    Procedure: COLONOSCOPY;  Colonoscopy;  Surgeon: Richie Kwon MD;  Location: [facility]     COLONOSCOPY N/A 12/15/2016    Procedure: COLONOSCOPY;  Surgeon: Richie Kwon MD;  Location: [facility]     EYE SURGERY  03/2017    laser     HC DILATION/CURETTAGE DIAG/THER NON OB       SURGICAL HISTORY OF -   05/05    right knee arthroscopy     [facility] COLONOSCOPY THRU STOMA, DIAGNOSTIC  11/03    due 10 years     Current Outpatient Medications   Medication Sig Dispense Refill     ASPIRIN 81 MG OR TABS 1 TABLET DAILY       CALCIUM + D 600-200 MG-UNIT OR TABS 2 TABLET DAILY       Cholecalciferol (VITAMIN D) 2000 UNITS tablet Take 2,000 Units by mouth daily 100 tablet 3     cycloSPORINE (RESTASIS) 0.05 % ophthalmic emulsion Place 1 drop into both eyes 2 times daily       FISH OIL 1,500 mg. Daily       gabapentin (NEURONTIN) 300 MG capsule Take 1 capsule by mouth 3 times daily       GLUCOSAMINE CHONDRO COMPLEX 500-400 MG OR TABS 3 tablets by mouth daily       MELATONIN PO Take 5 mg by mouth At Bedtime        METAMUCIL OR Take 1 teaspoonful once daily       minoxidil (ROGAINE) 5 % external solution Apply topically 2 times daily 2%       MULTIVITAMIN OR Take 1 tablet by mouth daily       naproxen sodium 220 MG capsule Take 220 mg by mouth 2 times daily        Probiotic Product (PROBIOTIC ACIDOPHILUS) CAPS Take 1 capsule by mouth daily        sertraline (ZOLOFT) 25 MG tablet Take 1 tablet (25 mg) by mouth daily 90 tablet 3       Allergies   Allergen Reactions     Augmentin Diarrhea        Social History     Tobacco Use     Smoking status: Never Smoker     Smokeless tobacco: Never Used   Substance Use Topics     Alcohol use: Yes     Comment: rare     Family History   Problem Relation Age of Onset     Hypertension Mother      Osteoporosis Mother   "    Cancer Maternal Grandmother         abdominal CA     Heart Disease Maternal Grandfather      Alcohol/Drug Paternal Grandfather      Cancer Brother         sarcoma     Arthritis Father      Alzheimer Disease Father      History   Drug Use No         Objective     /70 (BP Location: Right arm, Patient Position: Sitting, Cuff Size: Adult Regular)   Pulse 92   Temp 97  F (36.1  C) (Tympanic)   Resp 20   Ht 1.7 m (5' 6.93\")   Wt 69.4 kg (152 lb 14.4 oz)   SpO2 99%   BMI 24.00 kg/m      Physical Exam  GENERAL APPEARANCE: healthy, alert and no distress  HENT: ear canals and TM's normal and nose and mouth without ulcers or lesions  RESP: lungs clear to auscultation - no rales, rhonchi or wheezes  CV: regular rate and rhythm, normal S1 S2, no S3 or S4 and no murmur, click or rub   ABDOMEN: soft, nontender, no HSM or masses and bowel sounds normal  NEURO: Normal strength and tone, sensory exam grossly normal, mentation intact and speech normal    Recent Labs   Lab Test 10/08/21  0916 09/22/20  0807    139   POTASSIUM 4.2 4.6   CR 0.79 0.77        Diagnostics:  Labs pending at this time.  Results will be reviewed when available.   No EKG required, no history of coronary heart disease, significant arrhythmia, peripheral arterial disease or other structural heart disease.    Revised Cardiac Risk Index (RCRI):  The patient has the following serious cardiovascular risks for perioperative complications:   - No serious cardiac risks = 0 points     RCRI Interpretation: 0 points: Class I (very low risk - 0.4% complication rate)           Signed Electronically by: Jenny Guzman DO  Copy of this evaluation report is provided to requesting physician.        "

## 2022-03-18 NOTE — RESULT ENCOUNTER NOTE
The urine is negative for protein which is good.    Kidney function, hemoglobin, blood sugar and electrolytes are normal    The cholesterol is mildly elevated but improved from 3 years ago.  We briefly discussed the coronary artery calcium score.  This test is a CT scan which is not generally covered by insurance, but is typically about $100 out-of-pocket.  If you would like to pursue this test and then schedule a follow-up with me to discuss cholesterol we certainly can.  This can certainly wait till after surgery.

## 2022-03-30 ENCOUNTER — TRANSFERRED RECORDS (OUTPATIENT)
Dept: HEALTH INFORMATION MANAGEMENT | Facility: CLINIC | Age: 77
End: 2022-03-30
Payer: COMMERCIAL

## 2022-08-21 ENCOUNTER — VIRTUAL VISIT (OUTPATIENT)
Dept: URGENT CARE | Facility: CLINIC | Age: 77
End: 2022-08-21
Payer: COMMERCIAL

## 2022-08-21 DIAGNOSIS — U07.1 INFECTION DUE TO 2019 NOVEL CORONAVIRUS: Primary | ICD-10-CM

## 2022-08-21 PROCEDURE — 99213 OFFICE O/P EST LOW 20 MIN: CPT | Mod: CS

## 2022-08-21 NOTE — PATIENT INSTRUCTIONS
May return to public after 5 days if symptoms improved and no fever, or use of fever reducing medication   Wear a mask for 5 more days when in the presence of others

## 2022-08-21 NOTE — PROGRESS NOTES
Patty is a 77 year old who is being evaluated via a billable video visit.      How would you like to obtain your AVS? MyChart  If the video visit is dropped, the invitation should be resent by: Text to cell phone: [phone]  Will anyone else be joining your video visit? No        Assessment & Plan     Infection due to 2019 novel coronavirus      - nirmatrelvir and ritonavir (PAXLOVID) therapy pack; Take 2 tablets by mouth 2 times daily for 5 days (Take 1 tablet of Nirmatelvir and 1 tablet of Ritonavir twice daily for 5 days)      15 minutes spent on the date of the encounter doing chart review, history and exam, documentation and further activities per the note       Patient Instructions   May return to public after 5 days if symptoms improved and no fever, or use of fever reducing medication   Wear a mask for 5 more days when in the presence of others         No follow-ups on file.    Virtual Urgent Care  [facility]    Subjective   Patty is a 77 year old presenting for the following health issues:  Covid Concern      HPI       COVID-19 Symptom Review  How many days ago did these symptoms start? scratchy throat 8/19/2022 8/21/2022 tested     Are any of the following symptoms significant for you?    New or worsening difficulty breathing? No    Worsening cough? Yes, it's a dry cough.     Fever or chills? No    Headache: No    Sore throat: YES    Chest pain: No    Diarrhea: No    Body aches? No    What treatments has patient tried? Acetaminophen and coricidin  Does patient live in a nursing home, group home, or shelter? No  Does patient have a way to get food/medications during quarantined? Yes, I have a friend or family member who can help me.              Review of Systems   Constitutional, HEENT, cardiovascular, pulmonary, GI, , musculoskeletal, neuro, skin, endocrine and psych systems are negative, except as otherwise noted.      Objective           Vitals:  No vitals were obtained  today due to virtual visit.    Physical Exam   GENERAL:  alert and no distress  EYES: Eyes grossly normal to inspection.  No discharge or erythema, or obvious scleral/conjunctival abnormalities.  RESP: No audible wheeze, cough, or visible cyanosis.  No visible retractions or increased work of breathing.    SKIN: Visible skin clear. No significant rash, abnormal pigmentation or lesions.  NEURO: Cranial nerves grossly intact.  Mentation and speech appropriate for age.  PSYCH: Mentation appears normal, affect normal/bright, judgement and insight intact, normal speech and appearance well-groomed.            Video-Visit Details    Video Start Time: 1800    Type of service:  Video Visit    Video End Time:6:20 PM    Originating Location (pt. Location): Home    Distant Location (provider location):  [facility]     Platform used for Video Visit: [email or web address]    Elijah Barba

## 2022-10-06 ASSESSMENT — ENCOUNTER SYMPTOMS
PARESTHESIAS: 0
WEAKNESS: 0
CONSTIPATION: 0
HEMATURIA: 0
BREAST MASS: 0
JOINT SWELLING: 0
SORE THROAT: 0
CHILLS: 0
SHORTNESS OF BREATH: 0
FEVER: 0
COUGH: 0
DYSURIA: 0
NAUSEA: 0
DIARRHEA: 0
HEMATOCHEZIA: 0
MYALGIAS: 0
DIZZINESS: 0
ABDOMINAL PAIN: 0
ARTHRALGIAS: 1
NERVOUS/ANXIOUS: 1
PALPITATIONS: 0
HEARTBURN: 0
EYE PAIN: 0
HEADACHES: 0
FREQUENCY: 0

## 2022-10-06 ASSESSMENT — ACTIVITIES OF DAILY LIVING (ADL): CURRENT_FUNCTION: NO ASSISTANCE NEEDED

## 2022-10-11 ENCOUNTER — OFFICE VISIT (OUTPATIENT)
Dept: FAMILY MEDICINE | Facility: CLINIC | Age: 77
End: 2022-10-11
Payer: COMMERCIAL

## 2022-10-11 VITALS
TEMPERATURE: 97.3 F | HEIGHT: 67 IN | WEIGHT: 148.8 LBS | RESPIRATION RATE: 20 BRPM | BODY MASS INDEX: 23.35 KG/M2 | SYSTOLIC BLOOD PRESSURE: 128 MMHG | HEART RATE: 87 BPM | DIASTOLIC BLOOD PRESSURE: 80 MMHG | OXYGEN SATURATION: 98 %

## 2022-10-11 DIAGNOSIS — E78.5 HYPERLIPIDEMIA LDL GOAL <100: ICD-10-CM

## 2022-10-11 DIAGNOSIS — F33.8 SEASONAL AFFECTIVE DISORDER (H): ICD-10-CM

## 2022-10-11 DIAGNOSIS — Z00.00 ENCOUNTER FOR MEDICARE ANNUAL WELLNESS EXAM: Primary | ICD-10-CM

## 2022-10-11 PROCEDURE — G0439 PPPS, SUBSEQ VISIT: HCPCS | Performed by: INTERNAL MEDICINE

## 2022-10-11 PROCEDURE — 99214 OFFICE O/P EST MOD 30 MIN: CPT | Mod: 25 | Performed by: INTERNAL MEDICINE

## 2022-10-11 RX ORDER — SERTRALINE HYDROCHLORIDE 25 MG/1
25 TABLET, FILM COATED ORAL DAILY
Qty: 90 TABLET | Refills: 3 | Status: SHIPPED | OUTPATIENT
Start: 2022-10-11 | End: 2023-11-03

## 2022-10-11 ASSESSMENT — PATIENT HEALTH QUESTIONNAIRE - PHQ9
SUM OF ALL RESPONSES TO PHQ QUESTIONS 1-9: 1
SUM OF ALL RESPONSES TO PHQ QUESTIONS 1-9: 1
10. IF YOU CHECKED OFF ANY PROBLEMS, HOW DIFFICULT HAVE THESE PROBLEMS MADE IT FOR YOU TO DO YOUR WORK, TAKE CARE OF THINGS AT HOME, OR GET ALONG WITH OTHER PEOPLE: NOT DIFFICULT AT ALL
5. POOR APPETITE OR OVEREATING: SEVERAL DAYS

## 2022-10-11 ASSESSMENT — ENCOUNTER SYMPTOMS
HEARTBURN: 0
FEVER: 0
MYALGIAS: 0
PALPITATIONS: 0
CONSTIPATION: 0
BREAST MASS: 0
NAUSEA: 0
HEMATOCHEZIA: 0
DIZZINESS: 0
EYE PAIN: 0
ABDOMINAL PAIN: 0
JOINT SWELLING: 0
PARESTHESIAS: 0
ARTHRALGIAS: 1
NERVOUS/ANXIOUS: 1
HEADACHES: 0
DYSURIA: 0
SORE THROAT: 0
SHORTNESS OF BREATH: 0
CHILLS: 0
COUGH: 0
DIARRHEA: 0
WEAKNESS: 0
FREQUENCY: 0
HEMATURIA: 0

## 2022-10-11 ASSESSMENT — ANXIETY QUESTIONNAIRES
2. NOT BEING ABLE TO STOP OR CONTROL WORRYING: SEVERAL DAYS
7. FEELING AFRAID AS IF SOMETHING AWFUL MIGHT HAPPEN: NOT AT ALL
1. FEELING NERVOUS, ANXIOUS, OR ON EDGE: SEVERAL DAYS
5. BEING SO RESTLESS THAT IT IS HARD TO SIT STILL: SEVERAL DAYS
6. BECOMING EASILY ANNOYED OR IRRITABLE: SEVERAL DAYS
IF YOU CHECKED OFF ANY PROBLEMS ON THIS QUESTIONNAIRE, HOW DIFFICULT HAVE THESE PROBLEMS MADE IT FOR YOU TO DO YOUR WORK, TAKE CARE OF THINGS AT HOME, OR GET ALONG WITH OTHER PEOPLE: NOT DIFFICULT AT ALL
3. WORRYING TOO MUCH ABOUT DIFFERENT THINGS: SEVERAL DAYS
GAD7 TOTAL SCORE: 6
GAD7 TOTAL SCORE: 6

## 2022-10-11 ASSESSMENT — PAIN SCALES - GENERAL: PAINLEVEL: NO PAIN (0)

## 2022-10-11 ASSESSMENT — ACTIVITIES OF DAILY LIVING (ADL): CURRENT_FUNCTION: NO ASSISTANCE NEEDED

## 2022-10-11 NOTE — PROGRESS NOTES
"SUBJECTIVE:   Patty is a 77 year old who presents for Preventive Visit.      Patient has been advised of split billing requirements and indicates understanding: Yes  Are you in the first 12 months of your Medicare coverage?  No    Healthy Habits:     In general, how would you rate your overall health?  Good    Frequency of exercise:  4-5 days/week    Duration of exercise:  45-60 minutes    Do you usually eat at least 4 servings of fruit and vegetables a day, include whole grains    & fiber and avoid regularly eating high fat or \"junk\" foods?  Yes    Taking medications regularly:  Yes    Barriers to taking medications:  None    Medication side effects:  None    Ability to successfully perform activities of daily living:  No assistance needed    Home Safety:  Lack of grab bars in the bathroom    Hearing Impairment:  Difficulty following a conversation in a noisy restaurant or crowded room    In the past 6 months, have you been bothered by leaking of urine?  No    In general, how would you rate your overall mental or emotional health?  Good      PHQ-2 Total Score: 0    Additional concerns today:  No      Chief Complaint   Patient presents with     medicare wellness     Depression     Anxiety     Lipids     Health Maintenance     Due for hep B /covid  dont want shots will get later       Do you feel safe in your environment? Yes    Have you ever done Advance Care Planning? (For example, a Health Directive, POLST, or a discussion with a medical provider or your loved ones about your wishes): Yes, patient states has an Advance Care Planning document and will bring a copy to the clinic.       Fall risk  Fallen 2 or more times in the past year?: No  Any fall with injury in the past year?: No    Cognitive Screening   1) Repeat 3 items (Leader, Season, Table)    2) Clock draw: NORMAL  3) 3 item recall: Recalls 3 objects  Results: 3 items recalled: COGNITIVE IMPAIRMENT LESS LIKELY    Mini-CogTM Copyright S Matt. Licensed by " the author for use in [facility]; reprinted with permission (sonia@[facility]). All rights reserved.      Do you have sleep apnea, excessive snoring or daytime drowsiness?: no    Reviewed and updated as needed this visit by clinical staff   Tobacco  Allergies  Meds     Fam Hx  Soc Hx        Reviewed and updated as needed this visit by Provider                 Social History     Tobacco Use     Smoking status: Never     Smokeless tobacco: Never   Substance Use Topics     Alcohol use: Yes     Comment: rare     If you drink alcohol do you typically have >3 drinks per day or >7 drinks per week? No    No flowsheet data found.        Hyperlipidemia Follow-Up      Are you regularly taking any medication or supplement to lower your cholesterol?   Yes- fish oil daily diet and exercise     Are you having muscle aches or other side effects that you think could be caused by your cholesterol lowering medication?  No         Depression and Anxiety Follow-Up    How are you doing with your depression since your last visit? Improved meds are helping taking daily    How are you doing with your anxiety since your last visit?  Worsened life/ stress     Are you having other symptoms that might be associated with depression or anxiety? No    Have you had a significant life event? No     Do you have any concerns with your use of alcohol or other drugs? No     Feels she is having a bit more anxiety    On sertraline 25 mg;  On sertraline since 9/2019; no other med trials    Has found it helpful; no side effects     Trying to exercise more for mental health    Brother noted increased anxiety - feels more fidgety and restless; harder time making decision    Previous diagnosis was SAD; she continued the sertraline through summer months    Has active social life; doesn't feel depressed    Doesn't feel that anxiety is negatively impacting her life    Social History     Tobacco Use     Smoking status: Never     Smokeless tobacco:  Never   Vaping Use     Vaping Use: Never used   Substance Use Topics     Alcohol use: Yes     Comment: rare     Drug use: No     PHQ 6/8/2020 10/8/2021 10/11/2022   PHQ-9 Total Score 1 0 1   Q9: Thoughts of better off dead/self-harm past 2 weeks Not at all Not at all Not at all     SONYA-7 SCORE 7/12/2017 10/8/2021 10/11/2022   Total Score 3 6 6     Last PHQ-9 10/11/2022   1.  Little interest or pleasure in doing things 0   2.  Feeling down, depressed, or hopeless 0   3.  Trouble falling or staying asleep, or sleeping too much 0   4.  Feeling tired or having little energy 0   5.  Poor appetite or overeating 0   6.  Feeling bad about yourself 1   7.  Trouble concentrating 0   8.  Moving slowly or restless 0   Q9: Thoughts of better off dead/self-harm past 2 weeks 0   PHQ-9 Total Score 1   Difficulty at work, home, or with people -     SONYA-7  10/11/2022   1. Feeling nervous, anxious, or on edge 1   2. Not being able to stop or control worrying 1   3. Worrying too much about different things 1   4. Trouble relaxing 1   5. Being so restless that it is hard to sit still 1   6. Becoming easily annoyed or irritable 1   7. Feeling afraid, as if something awful might happen 0   SONYA-7 Total Score 6   If you checked any problems, how difficult have they made it for you to do your work, take care of things at home, or get along with other people? Not difficult at all       Suicide Assessment Five-step Evaluation and Treatment (SAFE-T)      Current providers sharing in care for this patient include:   Patient Care Team:  Jenny Guzman DO as PCP - General (Internal Medicine)  Jenny Guzman DO as Assigned PCP    The following health maintenance items are reviewed in Epic and correct as of today:  Health Maintenance   Topic Date Due     HEPATITIS B IMMUNIZATION (1 of 3 - 3-dose series) Never done     COVID-19 Vaccine (5 - Booster for Moderna series) 07/02/2022     BMP  03/18/2023     LIPID  03/18/2023     MICROALBUMIN   03/18/2023     ANNUAL REVIEW OF HM ORDERS  03/18/2023     HEMOGLOBIN  03/18/2023     PHQ-9  04/11/2023     MEDICARE ANNUAL WELLNESS VISIT  10/11/2023     FALL RISK ASSESSMENT  10/11/2023     MAMMO SCREENING  11/05/2023     COLORECTAL CANCER SCREENING  12/15/2026     ADVANCE CARE PLANNING  10/11/2027     DTAP/TDAP/TD IMMUNIZATION (4 - Td or Tdap) 10/08/2031     DEXA  10/28/2036     HEPATITIS C SCREENING  Completed     INFLUENZA VACCINE  Completed     Pneumococcal Vaccine: 65+ Years  Completed     URINALYSIS  Completed     ZOSTER IMMUNIZATION  Completed     IPV IMMUNIZATION  Aged Out     MENINGITIS IMMUNIZATION  Aged Out     Current Outpatient Medications   Medication Sig Dispense Refill     CALCIUM + D 600-200 MG-UNIT OR TABS 2 TABLET DAILY       Cholecalciferol (VITAMIN D) 2000 UNITS tablet Take 2,000 Units by mouth daily 100 tablet 3     cycloSPORINE (RESTASIS) 0.05 % ophthalmic emulsion Place 1 drop into both eyes 2 times daily       FISH OIL 1,500 mg. Daily       gabapentin (NEURONTIN) 300 MG capsule Take 1 capsule by mouth 3 times daily       GLUCOSAMINE CHONDRO COMPLEX 500-400 MG OR TABS 3 tablets by mouth daily       MELATONIN PO Take 5 mg by mouth At Bedtime        METAMUCIL OR Take 1 teaspoonful once daily       minoxidil (ROGAINE) 5 % external solution Apply topically 2 times daily 2%       MULTIVITAMIN OR Take 1 tablet by mouth daily       naproxen sodium 220 MG capsule Take 220 mg by mouth as needed       Probiotic Product (PROBIOTIC ACIDOPHILUS) CAPS Take 1 capsule by mouth daily        sertraline (ZOLOFT) 25 MG tablet Take 1 tablet (25 mg) by mouth daily 90 tablet 3       Mammogram Screening - Patient over age 75, has elected to continue with screening.  Pertinent mammograms are reviewed under the imaging tab.    Review of Systems   Constitutional: Negative for chills and fever.   HENT: Positive for hearing loss. Negative for congestion, ear pain and sore throat.    Eyes: Negative for pain and visual  "disturbance.   Respiratory: Negative for cough and shortness of breath.    Cardiovascular: Negative for chest pain, palpitations and peripheral edema.   Gastrointestinal: Negative for abdominal pain, constipation, diarrhea, heartburn, hematochezia and nausea.   Breasts:  Negative for tenderness, breast mass and discharge.   Genitourinary: Negative for dysuria, frequency, genital sores, hematuria, pelvic pain, urgency, vaginal bleeding and vaginal discharge.   Musculoskeletal: Positive for arthralgias. Negative for joint swelling and myalgias.   Skin: Negative for rash.   Neurological: Negative for dizziness, weakness, headaches and paresthesias.   Psychiatric/Behavioral: Negative for mood changes. The patient is nervous/anxious.          OBJECTIVE:   /80 (BP Location: Right arm, Patient Position: Sitting, Cuff Size: Adult Regular)   Pulse 87   Temp 97.3  F (36.3  C) (Tympanic)   Resp 20   Ht 1.7 m (5' 6.93\")   Wt 67.5 kg (148 lb 12.8 oz)   SpO2 98%   BMI 23.35 kg/m   Estimated body mass index is 23.35 kg/m  as calculated from the following:    Height as of this encounter: 1.7 m (5' 6.93\").    Weight as of this encounter: 67.5 kg (148 lb 12.8 oz).  Physical Exam  GENERAL: healthy, alert and no distress  EYES: Eyes grossly normal to inspection, PERRL and conjunctivae and sclerae normal  HENT: ear canals and TM's normal, nose and mouth without ulcers or lesions  NECK: no adenopathy, no asymmetry, masses, or scars and thyroid normal to palpation  RESP: lungs clear to auscultation - no rales, rhonchi or wheezes  CV: regular rate and rhythm, normal S1 S2, no S3 or S4, no murmur, click or rub, no peripheral edema and peripheral pulses strong  ABDOMEN: soft, nontender, no hepatosplenomegaly, no masses and bowel sounds normal  MS: no gross musculoskeletal defects noted, no edema  SKIN: no suspicious lesions or rashes  NEURO: Normal strength and tone, mentation intact and speech normal  PSYCH: mentation appears " "normal, affect normal/bright        ASSESSMENT / PLAN:   (Z00.00) Encounter for Medicare annual wellness exam  (primary encounter diagnosis)  Comment:   Plan:     (F33.8) Seasonal affective disorder (H)  Comment: discussed increasing dose vs non - Rx treatment.  Given symptoms are relatively mild and not significantly negatively impacting her life, decided against increasing the dose.  She will work on on medication ways to manage anxiety.  If symptoms are worsening, would increase the sertraline to 50 mg  Plan: sertraline (ZOLOFT) 25 MG tablet, OFFICE/OUTPT         VISIT,EST,LEVL IV            (E78.5) Hyperlipidemia LDL goal <100  Comment: Intermediate risk, high LDL but also high HDL, strong family history.  Discussed CT calcium score to further risk stratify and she is agreeable  Plan: CT Coronary Calcium Scan, OFFICE/OUTPT         VISIT,EST,LEVL IV              Patient has been advised of split billing requirements and indicates understanding: Yes    COUNSELING:  Reviewed preventive health counseling, as reflected in patient instructions    Estimated body mass index is 23.35 kg/m  as calculated from the following:    Height as of this encounter: 1.7 m (5' 6.93\").    Weight as of this encounter: 67.5 kg (148 lb 12.8 oz).        She reports that she has never smoked. She has never used smokeless tobacco.      Appropriate preventive services were discussed with this patient, including applicable screening as appropriate for cardiovascular disease, diabetes, osteopenia/osteoporosis, and glaucoma.  As appropriate for age/gender, discussed screening for colorectal cancer, prostate cancer, breast cancer, and cervical cancer. Checklist reviewing preventive services available has been given to the patient.    Reviewed patients plan of care and provided an AVS. The Complex Care Plan (for patients with higher acuity and needing more deliberate coordination of services) for Patty meets the Care Plan requirement. This Care " Plan has been established and reviewed with the Patient.    Counseling Resources:  ATP IV Guidelines  Pooled Cohorts Equation Calculator  Breast Cancer Risk Calculator  Breast Cancer: Medication to Reduce Risk  FRAX Risk Assessment  ICSI Preventive Guidelines  Dietary Guidelines for Americans, 2010  USDA's MyPlate  ASA Prophylaxis  Lung CA Screening    Jenny Guzman DO  [facility]    Identified Health Risks:  Answers for HPI/ROS submitted by the patient on 10/11/2022  If you checked off any problems, how difficult have these problems made it for you to do your work, take care of things at home, or get along with other people?: Not difficult at all  PHQ9 TOTAL SCORE: 1        The patient was provided with written information regarding signs of hearing loss.

## 2022-10-11 NOTE — PATIENT INSTRUCTIONS
"Anxiety:  No change to sertraline;  discussed non-medication ways to manage anxiety - exercise, break from social media/news  If symptoms were to worse, we may consider increasing medication    Lipid:  Recommend CT calcium score.  Radiology test was ordered.  Please call [phone] to schedule.        Relaxation Techniques    Breathing Exercises:    Inhale through your nose counting to \"4\" as you are breathing in  Exhale through your mouth - counting to \"6:\" or more - pursing your lips to slow down the exhalation.  Try to do this exercise 3 times if you are able to -remember even once is going to make a difference.     Beach Ball Exercise:    Imagine you are holding a deflated beach ball with both hands in front of you.  As you inhale -imagine to be trouble filling with air  -until you have experienced a complete inhalation.  As you exhale through your mouth, slightly pursed lips, managing herself squeezing the air out of the beach well.    The following two exercises are from: DBT Skills Training Handouts and Worksheets, 2nd edition.  2015.  Iveth Gr      Half Smile Exercise:    1st: Relax your face from top of her head down to her chin and jaw.  Let go of each facial muscle: Forehead, eyes, brows, cheeks, mouth, tongue - teeth slightly apart  2nd: Left both corners of her lips go slightly up, just so you can feel them.  It is not necessary for others to see it.  A half smile is slightly turned up lips with a relaxed face.  3rd: Adopt a serene facial expression.  Think of the Paulette Ladonna and here serene face    Willing Hands Exercise:    Notice the positions of your hands - especially if you are feeling some stress  While sitting place your hands on your lap - with hands unclenched - turn your palms up with fingers relaxed                  Patient Education   Personalized Prevention Plan  You are due for the preventive services outlined below.  Your care team is available to assist you in scheduling these " services.  If you have already completed any of these items, please share that information with your care team to update in your medical record.  Health Maintenance Due   Topic Date Due    Hepatitis B Vaccine (1 of 3 - 3-dose series) Never done    COVID-19 Vaccine (5 - Booster for Moderna series) 07/02/2022       Signs of Hearing Loss      Hearing much better with one ear can be a sign of hearing loss.   Hearing loss is a problem shared by many people. In fact, it is one of the most common health problems, particularly as people age. Most people age 65 and older have some hearing loss. By age 80, almost everyone does. Hearing loss often occurs slowly over the years. So you may not realize your hearing has gotten worse.  Have your hearing checked  Call your healthcare provider if you:  Have to strain to hear normal conversation  Have to watch other people s faces very carefully to follow what they re saying  Need to ask people to repeat what they ve said  Often misunderstand what people are saying  Turn the volume of the television or radio up so high that others complain  Feel that people are mumbling when they re talking to you  Find that the effort to hear leaves you feeling tired and irritated  Notice, when using the phone, that you hear better with one ear than the other  [employer] last reviewed this educational content on 1/1/2020 2000-2021 [employer]. All rights reserved. This information is not intended as a substitute for professional medical care. Always follow your healthcare professional's instructions.

## 2022-10-18 ENCOUNTER — DOCUMENTATION ONLY (OUTPATIENT)
Dept: OTHER | Facility: CLINIC | Age: 77
End: 2022-10-18

## 2022-10-19 ENCOUNTER — HOSPITAL ENCOUNTER (OUTPATIENT)
Dept: CT IMAGING | Facility: CLINIC | Age: 77
Discharge: HOME | End: 2022-10-19
Attending: INTERNAL MEDICINE | Admitting: INTERNAL MEDICINE

## 2022-10-19 DIAGNOSIS — E78.5 HYPERLIPIDEMIA LDL GOAL <100: ICD-10-CM

## 2022-10-19 PROCEDURE — 75571 CT HRT W/O DYE W/CA TEST: CPT

## 2022-10-19 PROCEDURE — 75571 CT HRT W/O DYE W/CA TEST: CPT | Mod: 26 | Performed by: INTERNAL MEDICINE

## 2022-10-24 ENCOUNTER — TELEPHONE (OUTPATIENT)
Dept: FAMILY MEDICINE | Facility: CLINIC | Age: 77
End: 2022-10-24

## 2022-10-24 NOTE — TELEPHONE ENCOUNTER
Patient had CT calcium score that is in the intermediate risk category;  Recommend virtual visit to discuss results, discuss starting statin;  If patient would like to start statin, I can order, but I usually like to review the med, side effects, goals of med, etc

## 2022-10-25 NOTE — TELEPHONE ENCOUNTER
Patient returned call to clinic. Notified of providers response. Virtual appointment scheduled for after vacation.    Yoli Sterling RN

## 2022-11-08 ENCOUNTER — HOSPITAL ENCOUNTER (OUTPATIENT)
Dept: MAMMOGRAPHY | Facility: CLINIC | Age: 77
Discharge: HOME | End: 2022-11-08
Attending: INTERNAL MEDICINE | Admitting: INTERNAL MEDICINE
Payer: COMMERCIAL

## 2022-11-08 DIAGNOSIS — Z12.31 VISIT FOR SCREENING MAMMOGRAM: ICD-10-CM

## 2022-11-08 PROCEDURE — 77067 SCR MAMMO BI INCL CAD: CPT

## 2022-11-11 ENCOUNTER — VIRTUAL VISIT (OUTPATIENT)
Dept: FAMILY MEDICINE | Facility: CLINIC | Age: 77
End: 2022-11-11
Payer: COMMERCIAL

## 2022-11-11 DIAGNOSIS — E78.5 HYPERLIPIDEMIA LDL GOAL <160: Primary | ICD-10-CM

## 2022-11-11 PROCEDURE — 99213 OFFICE O/P EST LOW 20 MIN: CPT | Mod: 95 | Performed by: INTERNAL MEDICINE

## 2022-11-11 RX ORDER — ATORVASTATIN CALCIUM 20 MG/1
20 TABLET, FILM COATED ORAL DAILY
Qty: 90 TABLET | Refills: 3 | Status: SHIPPED | OUTPATIENT
Start: 2022-11-11 | End: 2023-11-03

## 2022-11-11 ASSESSMENT — PAIN SCALES - GENERAL: PAINLEVEL: NO PAIN (0)

## 2022-11-11 NOTE — PROGRESS NOTES
Patty is a 77 year old who is being evaluated via a billable video visit.      How would you like to obtain your AVS? MyChart  If the video visit is dropped, the invitation should be resent by: Text to cell phone: [phone]  Will anyone else be joining your video visit? No        Assessment & Plan     Hyperlipidemia LDL goal <160 -discussed risk and benefit of statin.  Discussed the coronary artery calcium score and its implications.  She is otherwise healthy and longevity in both her parents, so longevity is likely in her future.  Therefore, she is likely to benefit from the statin.  She is in agreement with starting the statin.  Discussed side effects.  Recheck lipids in 2 months  - atorvastatin (LIPITOR) 20 MG tablet; Take 1 tablet (20 mg) by mouth daily  - Lipid panel reflex to direct LDL Fasting; Future                 No follow-ups on file.    Jenny Guzman, [facility]    Subjective   Patty is a 77 year old, presenting for the following health issues:  Results (Discuss CT calcium scan and next steps)    Chief Complaint   Patient presents with     Results     Discuss CT calcium scan and next steps       History of Present Illness       She eats 4 or more servings of fruits and vegetables daily.She consumes 0 sweetened beverage(s) daily.She exercises with enough effort to increase her heart rate 30 to 60 minutes per day.  She exercises with enough effort to increase her heart rate 5 days per week.   She is taking medications regularly.    Hyperlipidemia:  --has not been on statin  --high LDL but also high HDL, strong family history of CHF  --father with TIA (lived to 98)  --brother with hyperlipidemia on a statin;  No vascular disease       Current Outpatient Medications   Medication Sig Dispense Refill     CALCIUM + D 600-200 MG-UNIT OR TABS 2 TABLET DAILY       Cholecalciferol (VITAMIN D) 2000 UNITS tablet Take 2,000 Units by mouth daily 100 tablet 3     cycloSPORINE  (RESTASIS) 0.05 % ophthalmic emulsion Place 1 drop into both eyes 2 times daily       FISH OIL 1,500 mg. Daily       gabapentin (NEURONTIN) 300 MG capsule Take 1 capsule by mouth 3 times daily       GLUCOSAMINE CHONDRO COMPLEX 500-400 MG OR TABS 3 tablets by mouth daily       MELATONIN PO Take 5 mg by mouth At Bedtime        METAMUCIL OR Take 1 teaspoonful once daily       minoxidil (ROGAINE) 5 % external solution Apply topically 2 times daily 2%       MULTIVITAMIN OR Take 1 tablet by mouth daily       naproxen sodium 220 MG capsule Take 220 mg by mouth as needed       Probiotic Product (PROBIOTIC ACIDOPHILUS) CAPS Take 1 capsule by mouth daily        sertraline (ZOLOFT) 25 MG tablet Take 1 tablet (25 mg) by mouth daily 90 tablet 3         Review of Systems   CONSTITUTIONAL: NEGATIVE for fever, chills, change in weight  ENT/MOUTH: NEGATIVE for ear, mouth and throat problems  RESP: NEGATIVE for significant cough or SOB  CV: NEGATIVE for chest pain, palpitations or peripheral edema      Objective    Vitals - Patient Reported  Pain Score: No Pain (0)      Vitals:  No vitals were obtained today due to virtual visit.    Physical Exam   GENERAL: Healthy, alert and no distress  EYES: Eyes grossly normal to inspection.  No discharge or erythema, or obvious scleral/conjunctival abnormalities.  RESP: No audible wheeze, cough, or visible cyanosis.  No visible retractions or increased work of breathing.    SKIN: Visible skin clear. No significant rash, abnormal pigmentation or lesions.  NEURO: Cranial nerves grossly intact.  Mentation and speech appropriate for age.  PSYCH: Mentation appears normal, affect normal/bright, judgement and insight intact, normal speech and appearance well-groomed.    Video-Visit Details    Video Start Time: 3:34 PM    Type of service:  Video Visit    Video End Time:3:50 PM    Originating Location (pt. Location): Home        Distant Location (provider location):  On-site    Platform used for Video  Visit: Jl

## 2022-11-30 ENCOUNTER — IMMUNIZATION (OUTPATIENT)
Dept: FAMILY MEDICINE | Facility: CLINIC | Age: 77
End: 2022-11-30
Payer: COMMERCIAL

## 2022-11-30 PROCEDURE — 0134A COVID-19 VACCINE BIVALENT BOOSTER 18+ (MODERNA): CPT

## 2022-11-30 PROCEDURE — 91313 COVID-19 VACCINE BIVALENT BOOSTER 18+ (MODERNA): CPT

## 2023-01-18 ENCOUNTER — LAB (OUTPATIENT)
Dept: LAB | Facility: CLINIC | Age: 78
End: 2023-01-18
Payer: COMMERCIAL

## 2023-01-18 DIAGNOSIS — N18.31 STAGE 3A CHRONIC KIDNEY DISEASE (H): Primary | ICD-10-CM

## 2023-01-18 DIAGNOSIS — E78.5 HYPERLIPIDEMIA LDL GOAL <160: ICD-10-CM

## 2023-01-18 LAB
CHOLEST SERPL-MCNC: 169 MG/DL
HDLC SERPL-MCNC: 82 MG/DL
HGB BLD-MCNC: 13.4 G/DL (ref 11.7–15.7)
LDLC SERPL CALC-MCNC: 73 MG/DL
NONHDLC SERPL-MCNC: 87 MG/DL
TRIGL SERPL-MCNC: 71 MG/DL

## 2023-01-18 PROCEDURE — 80061 LIPID PANEL: CPT

## 2023-01-18 PROCEDURE — 85018 HEMOGLOBIN: CPT

## 2023-01-18 PROCEDURE — 36415 COLL VENOUS BLD VENIPUNCTURE: CPT

## 2023-03-21 ENCOUNTER — OFFICE VISIT (OUTPATIENT)
Dept: FAMILY MEDICINE | Facility: CLINIC | Age: 78
End: 2023-03-21
Payer: COMMERCIAL

## 2023-03-21 VITALS
BODY MASS INDEX: 23.36 KG/M2 | HEART RATE: 84 BPM | OXYGEN SATURATION: 99 % | HEIGHT: 67 IN | RESPIRATION RATE: 12 BRPM | DIASTOLIC BLOOD PRESSURE: 80 MMHG | SYSTOLIC BLOOD PRESSURE: 126 MMHG | WEIGHT: 148.81 LBS | TEMPERATURE: 98.3 F

## 2023-03-21 DIAGNOSIS — R05.1 ACUTE COUGH: Primary | ICD-10-CM

## 2023-03-21 DIAGNOSIS — J02.9 SORE THROAT: ICD-10-CM

## 2023-03-21 DIAGNOSIS — N18.31 STAGE 3A CHRONIC KIDNEY DISEASE (H): ICD-10-CM

## 2023-03-21 DIAGNOSIS — F33.8 SEASONAL AFFECTIVE DISORDER (H): ICD-10-CM

## 2023-03-21 LAB
DEPRECATED S PYO AG THROAT QL EIA: NEGATIVE
GROUP A STREP BY PCR: NOT DETECTED
SARS-COV-2 RNA RESP QL NAA+PROBE: NEGATIVE

## 2023-03-21 PROCEDURE — 87651 STREP A DNA AMP PROBE: CPT | Performed by: INTERNAL MEDICINE

## 2023-03-21 PROCEDURE — U0005 INFEC AGEN DETEC AMPLI PROBE: HCPCS | Performed by: INTERNAL MEDICINE

## 2023-03-21 PROCEDURE — U0003 INFECTIOUS AGENT DETECTION BY NUCLEIC ACID (DNA OR RNA); SEVERE ACUTE RESPIRATORY SYNDROME CORONAVIRUS 2 (SARS-COV-2) (CORONAVIRUS DISEASE [COVID-19]), AMPLIFIED PROBE TECHNIQUE, MAKING USE OF HIGH THROUGHPUT TECHNOLOGIES AS DESCRIBED BY CMS-2020-01-R: HCPCS | Performed by: INTERNAL MEDICINE

## 2023-03-21 PROCEDURE — 99213 OFFICE O/P EST LOW 20 MIN: CPT | Mod: CS | Performed by: INTERNAL MEDICINE

## 2023-03-21 ASSESSMENT — PATIENT HEALTH QUESTIONNAIRE - PHQ9: SUM OF ALL RESPONSES TO PHQ QUESTIONS 1-9: 0

## 2023-03-21 ASSESSMENT — PAIN SCALES - GENERAL: PAINLEVEL: NO PAIN (0)

## 2023-03-21 NOTE — PROGRESS NOTES
Assessment & Plan     Acute cough - probable viral.  Conservative care recommended  - Symptomatic COVID-19 Virus (Coronavirus) by PCR Nose    Sore throat  - Streptococcus A Rapid Screen w/Reflex to PCR - Clinic Collect  - Group A Streptococcus PCR Throat Swab    Stage 3a chronic kidney disease (H) Known issue that I take into account for their medical decisions, no current exacerbations or new concerns  - BASIC METABOLIC PANEL; Future  - Albumin Random Urine Quantitative with Creat Ratio; Future    Seasonal affective disorder (H) Known issue that I take into account for their medical decisions, no current exacerbations or new concerns               Patient Instructions   You have a cold, which is a virus.  Antibiotics treat bacterial infections and not viral infections.  They will not cure or shorten a cold.  The main way to feel better is rest, hydration, good nutrition.  You can try some of the following over the counter medicines:    --For cough - try Robitussin DM or Mucinex DM (Acitve ingredients Guaifenesin and dextromethorphan)  --For nasal congestion, try saline nasal spray (Ocean brand or similar.  NOT Afrin)  --For sore throat and cough, try Chloraseptic spray, cough drops, warm salt water gargles or tea with honey.    --Use a humidifier at night  --For body aches and pains and fever, try acetaminophen or ibuprofen          No follow-ups on file.    Jenny Guzman DO  [facility]    Veronica Brambila is a 78 year old accompanied by her self, presenting for the following health issues:  Pharyngitis (At home COVID test was  and was neg  )      HPI     Acute Illness  Acute illness concerns: sore throat  Onset/Duration: 3/18/23  Symptoms:  Fever: No  Chills/Sweats: No  Headache (location?): No  Sinus Pressure: No  Conjunctivitis:  No  Ear Pain: no  Rhinorrhea: No  Congestion: YES  Sore Throat: YES  Cough: YES-non-productive  Wheeze: No  Decreased Appetite: No  Nausea:  "No  Vomiting: No  Diarrhea: No  Dysuria/Freq.: No  Dysuria or Hematuria: No  Fatigue/Achiness: No  Sick/Strep Exposure: on a cruise last week 3/19/23 return   Therapies tried and outcome: cough drop, tylenol and water   --she has history of recurrent sinus infections        Current Outpatient Medications   Medication Sig Dispense Refill     atorvastatin (LIPITOR) 20 MG tablet Take 1 tablet (20 mg) by mouth daily 90 tablet 3     CALCIUM + D 600-200 MG-UNIT OR TABS 2 TABLET DAILY       Cholecalciferol (VITAMIN D) 2000 UNITS tablet Take 2,000 Units by mouth daily 100 tablet 3     cycloSPORINE (RESTASIS) 0.05 % ophthalmic emulsion Place 1 drop into both eyes 2 times daily       FISH OIL 1,500 mg. Daily       gabapentin (NEURONTIN) 300 MG capsule Take 1 capsule by mouth 3 times daily       GLUCOSAMINE CHONDRO COMPLEX 500-400 MG OR TABS 3 tablets by mouth daily       MELATONIN PO Take 5 mg by mouth At Bedtime        METAMUCIL OR Take 1 teaspoonful once daily       minoxidil (ROGAINE) 5 % external solution Apply topically 2 times daily 2%       MULTIVITAMIN OR Take 1 tablet by mouth daily       naproxen sodium 220 MG capsule Take 220 mg by mouth as needed       Probiotic Product (PROBIOTIC ACIDOPHILUS) CAPS Take 1 capsule by mouth daily        sertraline (ZOLOFT) 25 MG tablet Take 1 tablet (25 mg) by mouth daily 90 tablet 3         Review of Systems   Constitutional, HEENT, cardiovascular, pulmonary, gi and gu systems are negative, except as otherwise noted.      Objective    /80 (BP Location: Right arm, Patient Position: Sitting, Cuff Size: Adult Regular)   Pulse 84   Temp 98.3  F (36.8  C) (Tympanic)   Resp 12   Ht 1.7 m (5' 6.93\")   Wt 67.5 kg (148 lb 13 oz)   SpO2 99%   BMI 23.36 kg/m    Body mass index is 23.36 kg/m .  Physical Exam   GENERAL APPEARANCE: alert and no distress  EYES: Eyes grossly normal to inspection, PERRL and conjunctivae and sclerae normal  HENT: ear canals and TM's normal and tonsillar " erythema  NECK: no adenopathy, no asymmetry, masses, or scars and thyroid normal to palpation  RESP: lungs clear to auscultation - no rales, rhonchi or wheezes  CV: regular rates and rhythm, normal S1 S2, no S3 or S4 and no murmur, click or rub    Results for orders placed or performed in visit on 03/21/23 (from the past 24 hour(s))   Streptococcus A Rapid Screen w/Reflex to PCR - Clinic Collect    Specimen: Throat; Swab   Result Value Ref Range    Group A Strep antigen Negative Negative

## 2023-03-21 NOTE — PATIENT INSTRUCTIONS
You have a cold, which is a virus.  Antibiotics treat bacterial infections and not viral infections.  They will not cure or shorten a cold.  The main way to feel better is rest, hydration, good nutrition.  You can try some of the following over the counter medicines:    --For cough - try Robitussin DM or Mucinex DM (Acitve ingredients Guaifenesin and dextromethorphan)  --For nasal congestion, try saline nasal spray (Ocean brand or similar.  NOT Afrin)  --For sore throat and cough, try Chloraseptic spray, cough drops, warm salt water gargles or tea with honey.    --Use a humidifier at night  --For body aches and pains and fever, try acetaminophen or ibuprofen

## 2023-05-31 ENCOUNTER — NURSE TRIAGE (OUTPATIENT)
Dept: NURSING | Facility: CLINIC | Age: 78
End: 2023-05-31

## 2023-05-31 ENCOUNTER — OFFICE VISIT (OUTPATIENT)
Dept: FAMILY MEDICINE | Facility: CLINIC | Age: 78
End: 2023-05-31
Payer: COMMERCIAL

## 2023-05-31 VITALS
HEART RATE: 106 BPM | BODY MASS INDEX: 21.35 KG/M2 | HEIGHT: 67 IN | TEMPERATURE: 98.6 F | SYSTOLIC BLOOD PRESSURE: 114 MMHG | DIASTOLIC BLOOD PRESSURE: 72 MMHG | RESPIRATION RATE: 16 BRPM | OXYGEN SATURATION: 97 % | WEIGHT: 136 LBS

## 2023-05-31 DIAGNOSIS — J40 BRONCHITIS WITH WHEEZING: Primary | ICD-10-CM

## 2023-05-31 PROCEDURE — 99213 OFFICE O/P EST LOW 20 MIN: CPT | Performed by: NURSE PRACTITIONER

## 2023-05-31 RX ORDER — ALBUTEROL SULFATE 90 UG/1
2 AEROSOL, METERED RESPIRATORY (INHALATION) EVERY 6 HOURS PRN
Qty: 18 G | Refills: 0 | Status: SHIPPED | OUTPATIENT
Start: 2023-05-31 | End: 2023-11-03

## 2023-05-31 RX ORDER — DOXYCYCLINE HYCLATE 100 MG
100 TABLET ORAL 2 TIMES DAILY
Qty: 14 TABLET | Refills: 0 | Status: SHIPPED | OUTPATIENT
Start: 2023-05-31 | End: 2023-06-07

## 2023-05-31 ASSESSMENT — PAIN SCALES - GENERAL: PAINLEVEL: MILD PAIN (2)

## 2023-05-31 NOTE — PATIENT INSTRUCTIONS
Start doxycycline twice daily.  Can use albuterol every 4-6 hours as needed for cough wheezing.  If chest pain or shortness of breath, go to ER.  Return in one week if not improved.

## 2023-05-31 NOTE — TELEPHONE ENCOUNTER
Provider Response to 2nd Level Triage Request    I have reviewed the RN documentation. My recommendation is:  Face To Face Visit. either same day in clinic with any available provider or urgent care when it opens

## 2023-05-31 NOTE — TELEPHONE ENCOUNTER
Nurse Triage SBAR    Is this a 2nd Level Triage? YES, LICENSED PRACTITIONER REVIEW IS REQUIRED    Situation: wheezing    Background: Patient calling with cold symptoms that are worsening. Patient has a barky cough that is productive. Symptoms started almost 2 weeks ago. It started with a scratchy throat and progressed from there. Covid test negative two times. Patient treated with OTC cough and cold medicine, extra liquids and rest. Patient able to do her daily activities but has noticed some wheezing this morning.     On the phone patient is speaking at ease and no wheezing heard over the phone.   Denies fever, chest pain, shortness of breath    Assessment: pull into clinic or okay to wait for UC to open    Protocol Recommended Disposition:   Go To Office Now    Recommendation: disposition     Routed to provider    Does the patient meet one of the following criteria for ADS visit consideration? 16+ years old, with an [facility] PCP     TIP  Providers, please consider if this condition is appropriate for management at one of our Acute and Diagnostic Services sites.     If patient is a good candidate, please use dotphrase <dot>triageresponse and select Refer to ADS to document.        Protocol recommends go to office now.   Care advice given. UC opens at noon so routing to PCP for next steps.  Zoey Polanco RN   05/31/23 8:09 AM  [facility] Nurse Advisor    Reason for Disposition    Wheezing is present    Additional Information    Negative: Bluish (or gray) lips or face    Negative: SEVERE difficulty breathing (e.g., struggling for each breath, speaks in single words)    Negative: Rapid onset of cough and has hives    Negative: Coughing started suddenly after medicine, an allergic food or bee sting    Negative: Difficulty breathing after exposure to flames, smoke, or fumes    Negative: Sounds like a life-threatening emergency to the triager    Negative: Previous asthma attacks and this feels like asthma attack     Negative: Dry cough (non-productive; no sputum or minimal clear sputum) and within 14 days of COVID-19 Exposure    Negative: MODERATE difficulty breathing (e.g., speaks in phrases, SOB even at rest, pulse 100-120) and still present when not coughing    Negative: Chest pain present when not coughing    Negative: Passed out (i.e., fainted, collapsed and was not responding)    Negative: Patient sounds very sick or weak to the triager    Negative: MILD difficulty breathing (e.g., minimal/no SOB at rest, SOB with walking, pulse <100) and still present when not coughing    Negative: Coughed up > 1 tablespoon (15 ml) blood (Exception: Blood-tinged sputum.)    Negative: Fever > 103 F (39.4 C)    Negative: Fever > 101 F (38.3 C) and over 60 years of age    Negative: Fever > 100.0 F (37.8 C) and has diabetes mellitus or a weak immune system (e.g., HIV positive, cancer chemotherapy, organ transplant, splenectomy, chronic steroids)    Negative: Fever > 100.0 F (37.8 C) and bedridden (e.g., nursing home patient, stroke, chronic illness, recovering from surgery)    Negative: Increasing ankle swelling    Protocols used: COUGH-A-OH

## 2023-05-31 NOTE — TELEPHONE ENCOUNTER
Attempted to contact patient. Non detailed message left to return call to clinic at [phone].    Yoli Sterling RN

## 2023-05-31 NOTE — PROGRESS NOTES
Assessment & Plan     Bronchitis with wheezing  Ongoing symptoms for about 2 weeks. No chest pain or shortness of breath. No hemoptysis. Start doxycycline. Can use albuterol for wheezing. Given spacer with instruction for use. Follow up if not improving as expected.  - doxycycline hyclate (VIBRA-TABS) 100 MG tablet; Take 1 tablet (100 mg) by mouth 2 times daily for 7 days  - albuterol (PROAIR HFA/PROVENTIL HFA/VENTOLIN HFA) 108 (90 Base) MCG/ACT inhaler; Inhale 2 puffs into the lungs every 6 hours as needed for shortness of breath, wheezing or cough    Patient Instructions   Start doxycycline twice daily.  Can use albuterol every 4-6 hours as needed for cough wheezing.  If chest pain or shortness of breath, go to ER.  Return in one week if not improved.      [facility]    Veronica Brambila is a 78 year old, presenting for the following health issues:  URI        5/31/2023    11:32 AM   Additional Questions   Roomed by Joyce AGUIRRE   Accompanied by self     History of Present Illness       Reason for visit:  Cough, wheezing,  cold symptoms  Symptom onset:  1-2 weeks ago  Symptoms include:  Fatigue, cough, wheezing, nasal drainage  Symptom intensity:  Moderate  Symptom progression:  Worsening  Had these symptoms before:  No  What makes it worse:  Coughing  What makes it better:  Taking Coricidin, acetaminophen, hot tea    She eats 4 or more servings of fruits and vegetables daily.She consumes 0 sweetened beverage(s) daily.She exercises with enough effort to increase her heart rate 60 or more minutes per day.  She exercises with enough effort to increase her heart rate 5 days per week.   She is taking medications regularly.       Above HPI reviewed. Additionally, about 2 weeks of nasal congestion, runny nose, sore throat, cough that has been productive as well as wheezing.  She specifically denies chest pain or shortness of breath.  She has not had exertional dyspnea.   "She has not had any hemoptysis.  No fevers, chills, body aches.        Review of Systems   Constitutional, HEENT, cardiovascular, pulmonary, gi and gu systems are negative, except as otherwise noted.      Objective    /72   Pulse 106   Temp 98.6  F (37  C) (Tympanic)   Resp 16   Ht 1.699 m (5' 6.9\")   Wt 61.7 kg (136 lb)   SpO2 97%   BMI 21.36 kg/m    Body mass index is 21.36 kg/m .  Physical Exam  Vitals and nursing note reviewed.   Constitutional:       Appearance: Normal appearance.   HENT:      Head: Normocephalic and atraumatic.      Right Ear: Tympanic membrane and ear canal normal.      Left Ear: Tympanic membrane and ear canal normal.      Nose: Congestion present. No rhinorrhea.      Right Sinus: No maxillary sinus tenderness or frontal sinus tenderness.      Left Sinus: No maxillary sinus tenderness or frontal sinus tenderness.      Mouth/Throat:      Lips: Pink.      Mouth: Mucous membranes are moist.      Pharynx: Oropharynx is clear.   Eyes:      General: Lids are normal.      Conjunctiva/sclera: Conjunctivae normal.      Comments: Non icteric   Cardiovascular:      Rate and Rhythm: Normal rate and regular rhythm.      Pulses: Normal pulses.      Heart sounds: Normal heart sounds, S1 normal and S2 normal.   Pulmonary:      Effort: Pulmonary effort is normal.      Breath sounds: Normal air entry. Wheezing and rhonchi present.   Musculoskeletal:      Cervical back: Neck supple.   Lymphadenopathy:      Cervical: No cervical adenopathy.   Skin:     General: Skin is warm and dry.   Neurological:      General: No focal deficit present.      Mental Status: She is alert and oriented to person, place, and time.   Psychiatric:         Mood and Affect: Mood normal.         Behavior: Behavior normal.         Thought Content: Thought content normal.         Judgment: Judgment normal.                "

## 2023-05-31 NOTE — TELEPHONE ENCOUNTER
Patient called back to schedule appointment, is coming in today, 5/31/23. May Mendez on 5/31/2023 at 8:35 AM

## 2023-06-14 ENCOUNTER — TRANSFERRED RECORDS (OUTPATIENT)
Dept: HEALTH INFORMATION MANAGEMENT | Facility: CLINIC | Age: 78
End: 2023-06-14
Payer: COMMERCIAL

## 2023-10-30 ASSESSMENT — ENCOUNTER SYMPTOMS
WEAKNESS: 0
HEADACHES: 0
NAUSEA: 0
DIARRHEA: 0
HEMATOCHEZIA: 0
FEVER: 0
FREQUENCY: 0
EYE PAIN: 0
DIZZINESS: 0
HEARTBURN: 0
BREAST MASS: 0
JOINT SWELLING: 0
SHORTNESS OF BREATH: 0
CONSTIPATION: 0
PARESTHESIAS: 0
SORE THROAT: 0
COUGH: 0
PALPITATIONS: 0
HEMATURIA: 0
DYSURIA: 0
CHILLS: 0
ARTHRALGIAS: 1
ABDOMINAL PAIN: 0
MYALGIAS: 1
NERVOUS/ANXIOUS: 1

## 2023-10-30 ASSESSMENT — ACTIVITIES OF DAILY LIVING (ADL): CURRENT_FUNCTION: NO ASSISTANCE NEEDED

## 2023-11-03 ENCOUNTER — OFFICE VISIT (OUTPATIENT)
Dept: FAMILY MEDICINE | Facility: CLINIC | Age: 78
End: 2023-11-03
Payer: COMMERCIAL

## 2023-11-03 VITALS
DIASTOLIC BLOOD PRESSURE: 60 MMHG | RESPIRATION RATE: 16 BRPM | OXYGEN SATURATION: 99 % | HEIGHT: 66 IN | HEART RATE: 90 BPM | TEMPERATURE: 97.3 F | WEIGHT: 148.1 LBS | BODY MASS INDEX: 23.8 KG/M2 | SYSTOLIC BLOOD PRESSURE: 110 MMHG

## 2023-11-03 DIAGNOSIS — M85.852 OSTEOPENIA OF BOTH HIPS: ICD-10-CM

## 2023-11-03 DIAGNOSIS — F33.8 SEASONAL AFFECTIVE DISORDER (H): ICD-10-CM

## 2023-11-03 DIAGNOSIS — M85.851 OSTEOPENIA OF BOTH HIPS: ICD-10-CM

## 2023-11-03 DIAGNOSIS — E78.5 HYPERLIPIDEMIA LDL GOAL <100: ICD-10-CM

## 2023-11-03 DIAGNOSIS — Z78.0 MENOPAUSE: ICD-10-CM

## 2023-11-03 DIAGNOSIS — Z00.00 ENCOUNTER FOR MEDICARE ANNUAL WELLNESS EXAM: Primary | ICD-10-CM

## 2023-11-03 PROBLEM — N18.31 STAGE 3A CHRONIC KIDNEY DISEASE (H): Status: RESOLVED | Noted: 2020-09-22 | Resolved: 2023-11-03

## 2023-11-03 PROCEDURE — G0439 PPPS, SUBSEQ VISIT: HCPCS | Performed by: INTERNAL MEDICINE

## 2023-11-03 PROCEDURE — 91320 SARSCV2 VAC 30MCG TRS-SUC IM: CPT | Performed by: INTERNAL MEDICINE

## 2023-11-03 PROCEDURE — 90480 ADMN SARSCOV2 VAC 1/ONLY CMP: CPT | Performed by: INTERNAL MEDICINE

## 2023-11-03 PROCEDURE — 99213 OFFICE O/P EST LOW 20 MIN: CPT | Mod: 25 | Performed by: INTERNAL MEDICINE

## 2023-11-03 RX ORDER — ATORVASTATIN CALCIUM 20 MG/1
20 TABLET, FILM COATED ORAL DAILY
Qty: 90 TABLET | Refills: 3 | Status: SHIPPED | OUTPATIENT
Start: 2023-11-03

## 2023-11-03 RX ORDER — SERTRALINE HYDROCHLORIDE 25 MG/1
25 TABLET, FILM COATED ORAL DAILY
Qty: 90 TABLET | Refills: 3 | Status: SHIPPED | OUTPATIENT
Start: 2023-11-03

## 2023-11-03 ASSESSMENT — ENCOUNTER SYMPTOMS
CONSTIPATION: 0
EYE PAIN: 0
NAUSEA: 0
JOINT SWELLING: 0
HEMATOCHEZIA: 0
MYALGIAS: 1
PARESTHESIAS: 0
HEARTBURN: 0
COUGH: 0
DYSURIA: 0
PALPITATIONS: 0
ABDOMINAL PAIN: 0
FREQUENCY: 0
NERVOUS/ANXIOUS: 1
ARTHRALGIAS: 1
SHORTNESS OF BREATH: 0
WEAKNESS: 0
DIZZINESS: 0
SORE THROAT: 0
FEVER: 0
CHILLS: 0
HEADACHES: 0
DIARRHEA: 0
HEMATURIA: 0
BREAST MASS: 0

## 2023-11-03 ASSESSMENT — PAIN SCALES - GENERAL: PAINLEVEL: NO PAIN (0)

## 2023-11-03 ASSESSMENT — ACTIVITIES OF DAILY LIVING (ADL): CURRENT_FUNCTION: NO ASSISTANCE NEEDED

## 2023-11-03 NOTE — PATIENT INSTRUCTIONS
Hyperlipidemia  Come back for fasting blood work  Refill sent     Osteopenia  Recommend getting bone density test in the next 1 year      Patient Education   Personalized Prevention Plan  You are due for the preventive services outlined below.  Your care team is available to assist you in scheduling these services.  If you have already completed any of these items, please share that information with your care team to update in your medical record.  Health Maintenance Due   Topic Date Due    RSV VACCINE (Pregnancy & 60+) (1 - 1-dose 60+ series) Never done    Basic Metabolic Panel  03/18/2023    Kidney Microalbumin Urine Test  03/18/2023    Hemoglobin  01/18/2024     Hearing Loss: Care Instructions  Overview     Hearing loss is a sudden or slow decrease in how well you hear. It can range from slight to profound. Permanent hearing loss can occur with aging. It also can happen when you are exposed long-term to loud noise. Examples include listening to loud music, riding motorcycles, or being around other loud machines.  Hearing loss can affect your work and home life. It can make you feel lonely or depressed. You may feel that you have lost your independence. But hearing aids and other devices can help you hear better and feel connected to others.  Follow-up care is a key part of your treatment and safety. Be sure to make and go to all appointments, and call your doctor if you are having problems. It's also a good idea to know your test results and keep a list of the medicines you take.  How can you care for yourself at home?  Avoid loud noises whenever possible. This helps keep your hearing from getting worse.  Always wear hearing protection around loud noises.  Wear a hearing aid as directed.  A professional can help you pick a hearing aid that will work best for you.  You can also get hearing aids over the counter for mild to moderate hearing loss.  Have hearing tests as your doctor suggests. They can show whether  "your hearing has changed. Your hearing aid may need to be adjusted.  Use other devices as needed. These may include:  Telephone amplifiers and hearing aids that can connect to a television, stereo, radio, or microphone.  Devices that use lights or vibrations. These alert you to the doorbell, a ringing telephone, or a baby monitor.  Television closed-captioning. This shows the words at the bottom of the screen. Most new TVs can do this.  TTY (text telephone). This lets you type messages back and forth on the telephone instead of talking or listening. These devices are also called TDD. When messages are typed on the keyboard, they are sent over the phone line to a receiving TTY. The message is shown on a monitor.  Use text messaging, social media, and email if it is hard for you to communicate by telephone.  Try to learn a listening technique called speechreading. It is not lipreading. You pay attention to people's gestures, expressions, posture, and tone of voice. These clues can help you understand what a person is saying. Face the person you are talking to, and have them face you. Make sure the lighting is good. You need to see the other person's face clearly.  Think about counseling if you need help to adjust to your hearing loss.  When should you call for help?  Watch closely for changes in your health, and be sure to contact your doctor if:    You think your hearing is getting worse.     You have new symptoms, such as dizziness or nausea.   Where can you learn more?  Go to https://www.[employer].net/patiented  Enter R798 in the search box to learn more about \"Hearing Loss: Care Instructions.\"  Current as of: March 1, 2023               Content Version: 13.7    5749-1945 [employer].   Care instructions adapted under license by your healthcare professional. If you have questions about a medical condition or this instruction, always ask your healthcare professional. [employer] disclaims " any warranty or liability for your use of this information.

## 2023-11-03 NOTE — PROGRESS NOTES
"SUBJECTIVE:   Patty is a 78 year old who presents for Preventive Visit.      11/3/2023     8:45 AM   Additional Questions   Roomed by иван archer   Accompanied by self         11/3/2023     8:45 AM   Patient Reported Additional Medications   Patient reports taking the following new medications none       Are you in the first 12 months of your Medicare coverage?  No    Healthy Habits:     In general, how would you rate your overall health?  Good    Frequency of exercise:  4-5 days/week    Duration of exercise:  30-45 minutes    Do you usually eat at least 4 servings of fruit and vegetables a day, include whole grains    & fiber and avoid regularly eating high fat or \"junk\" foods?  Yes    Taking medications regularly:  Yes    Medication side effects:  None    Ability to successfully perform activities of daily living:  No assistance needed    Home Safety:  Lack of grab bars in the bathroom    Hearing Impairment:  Difficulty following a conversation in a noisy restaurant or crowded room    In the past 6 months, have you been bothered by leaking of urine?  No    In general, how would you rate your overall mental or emotional health?  Good    Additional concerns today:  No      Chief Complaint   Patient presents with     [facility]     Health Maintenance     Due for covid            Have you ever done Advance Care Planning? (For example, a Health Directive, POLST, or a discussion with a medical provider or your loved ones about your wishes): Yes, advance care planning is on file.       Fall risk  Fallen 2 or more times in the past year?: No  Any fall with injury in the past year?: No  \Cognitive Screening   1) Repeat 3 items (Leader, Season, Table)    2) Clock draw: NORMAL  3) 3 item recall: Recalls 3 objects  Results: 3 items recalled: COGNITIVE IMPAIRMENT LESS LIKELY    Mini-CogTM Copyright ARLYN Gutierrez. Licensed by the author for use in [facility]; reprinted with permission (sonia@.[facility]). All rights reserved.  "     Do you have sleep apnea, excessive snoring or daytime drowsiness? : no    Reviewed and updated as needed this visit by clinical staff   Tobacco  Allergies   Problems             Reviewed and updated as needed this visit by Provider      Problems            Social History     Tobacco Use     Smoking status: Never     Smokeless tobacco: Never   Substance Use Topics     Alcohol use: Yes     Comment: Rarely             10/30/2023     8:51 AM   Alcohol Use   Prescreen: >3 drinks/day or >7 drinks/week? No       Do you have a current opioid prescription? No  Do you use any other controlled substances or medications that are not prescribed by a provider? None          Hyperlipidemia Follow-Up    Are you regularly taking any medication or supplement to lower your cholesterol?   Yes- PM  Are you having muscle aches or other side effects that you think could be caused by your cholesterol lowering medication?  No    Current providers sharing in care for this patient include:   Patient Care Team:  Jenny Guzman DO as PCP - General (Internal Medicine)  Jenny Guzman DO as Assigned PCP    The following health maintenance items are reviewed in Epic and correct as of today:  Health Maintenance   Topic Date Due     RSV VACCINE (Pregnancy & 60+) (1 - 1-dose 60+ series) Never done     BMP  03/18/2023     MICROALBUMIN  03/18/2023     HEMOGLOBIN  01/18/2024     LIPID  01/18/2024     ANNUAL REVIEW OF HM ORDERS  03/21/2024     MEDICARE ANNUAL WELLNESS VISIT  11/03/2024     FALL RISK ASSESSMENT  11/03/2024     COLORECTAL CANCER SCREENING  12/15/2026     ADVANCE CARE PLANNING  11/03/2028     DTAP/TDAP/TD IMMUNIZATION (3 - Td or Tdap) 10/08/2031     DEXA  10/28/2036     HEPATITIS C SCREENING  Completed     PHQ-2 (once per calendar year)  Completed     INFLUENZA VACCINE  Completed     Pneumococcal Vaccine: 65+ Years  Completed     URINALYSIS  Completed     ZOSTER IMMUNIZATION  Completed     COVID-19 Vaccine  Completed      IPV IMMUNIZATION  Aged Out     HPV IMMUNIZATION  Aged Out     MENINGITIS IMMUNIZATION  Aged Out     RSV MONOCLONAL ANTIBODY  Aged Out     MAMMO SCREENING  Discontinued     Current Outpatient Medications   Medication Sig Dispense Refill     atorvastatin (LIPITOR) 20 MG tablet Take 1 tablet (20 mg) by mouth daily 90 tablet 3     CALCIUM + D 600-200 MG-UNIT OR TABS 2 TABLET DAILY       Cholecalciferol (VITAMIN D) 2000 UNITS tablet Take 2,000 Units by mouth daily 100 tablet 3     cycloSPORINE (RESTASIS) 0.05 % ophthalmic emulsion Place 1 drop into both eyes 2 times daily       FISH OIL 1,500 mg. Daily       gabapentin (NEURONTIN) 300 MG capsule Take 1 capsule by mouth 3 times daily       GLUCOSAMINE CHONDRO COMPLEX 500-400 MG OR TABS 3 tablets by mouth daily       MELATONIN PO Take 5 mg by mouth At Bedtime        METAMUCIL OR Take 1 teaspoonful once daily       minoxidil (ROGAINE) 5 % external solution Apply topically 2 times daily 2%       MULTIVITAMIN OR Take 1 tablet by mouth daily       naproxen sodium 220 MG capsule Take 220 mg by mouth as needed       Probiotic Product (PROBIOTIC ACIDOPHILUS) CAPS Take 1 capsule by mouth daily        sertraline (ZOLOFT) 25 MG tablet Take 1 tablet (25 mg) by mouth daily 90 tablet 3     albuterol (PROAIR HFA/PROVENTIL HFA/VENTOLIN HFA) 108 (90 Base) MCG/ACT inhaler Inhale 2 puffs into the lungs every 6 hours as needed for shortness of breath, wheezing or cough 18 g 0         Mammogram Screening - Patient over age 75, has elected to continue with screening.  Pertinent mammograms are reviewed under the imaging tab.    Review of Systems   Constitutional:  Negative for chills and fever.   HENT:  Positive for hearing loss. Negative for congestion, ear pain and sore throat.    Eyes:  Negative for pain and visual disturbance.   Respiratory:  Negative for cough and shortness of breath.    Cardiovascular:  Negative for chest pain, palpitations and peripheral edema.   Gastrointestinal:   "Negative for abdominal pain, constipation, diarrhea, heartburn, hematochezia and nausea.   Breasts:  Negative for tenderness, breast mass and discharge.   Genitourinary:  Negative for dysuria, frequency, genital sores, hematuria, pelvic pain, urgency, vaginal bleeding and vaginal discharge.   Musculoskeletal:  Positive for arthralgias and myalgias. Negative for joint swelling.   Skin:  Negative for rash.   Neurological:  Negative for dizziness, weakness, headaches and paresthesias.   Psychiatric/Behavioral:  Negative for mood changes. The patient is nervous/anxious.          OBJECTIVE:   /60 (BP Location: Right arm, Patient Position: Sitting, Cuff Size: Adult Regular)   Pulse 90   Temp 97.3  F (36.3  C) (Tympanic)   Resp 16   Ht 1.665 m (5' 5.55\")   Wt 67.2 kg (148 lb 1.6 oz)   SpO2 99%   BMI 24.23 kg/m   Estimated body mass index is 24.23 kg/m  as calculated from the following:    Height as of this encounter: 1.665 m (5' 5.55\").    Weight as of this encounter: 67.2 kg (148 lb 1.6 oz).  Physical Exam  GENERAL: healthy, alert and no distress  EYES: Eyes grossly normal to inspection, PERRL and conjunctivae and sclerae normal  HENT: ear canals and TM's normal, nose and mouth without ulcers or lesions  NECK: no adenopathy, no asymmetry, masses, or scars and thyroid normal to palpation  RESP: lungs clear to auscultation - no rales, rhonchi or wheezes  CV: regular rate and rhythm, normal S1 S2, no S3 or S4, no murmur, click or rub, no peripheral edema and peripheral pulses strong  ABDOMEN: soft, nontender, no hepatosplenomegaly, no masses and bowel sounds normal  MS: no gross musculoskeletal defects noted, no edema  SKIN: no suspicious lesions or rashes  NEURO: Normal strength and tone, mentation intact and speech normal  PSYCH: mentation appears normal, affect normal/bright      ASSESSMENT / PLAN:       ICD-10-CM    1. Encounter for Medicare annual wellness exam  Z00.00       2. Seasonal affective disorder " (H24)  F33.8 sertraline (ZOLOFT) 25 MG tablet     OFFICE/OUTPT VISIT,EST,LEVL IV      3. Hyperlipidemia LDL goal <100  E78.5 atorvastatin (LIPITOR) 20 MG tablet     Basic metabolic panel  (Ca, Cl, CO2, Creat, Gluc, K, Na, BUN)     Lipid panel reflex to direct LDL Fasting     OFFICE/OUTPT VISIT,EST,LEVL IV      4. Osteopenia of both hips  M85.851 DX Hip/Pelvis/Spine    M85.852 OFFICE/OUTPT VISIT,EST,LEVL IV      5. Menopause  Z78.0 DX Hip/Pelvis/Spine          Patient has been advised of split billing requirements and indicates understanding: Yes      COUNSELING:  Reviewed preventive health counseling, as reflected in patient instructions        She reports that she has never smoked. She has never used smokeless tobacco.      Appropriate preventive services were discussed with this patient, including applicable screening as appropriate for fall prevention, nutrition, physical activity, Tobacco-use cessation, weight loss and cognition.  Checklist reviewing preventive services available has been given to the patient.    Reviewed patients plan of care and provided an AVS. The Complex Care Plan (for patients with higher acuity and needing more deliberate coordination of services) for Patty meets the Care Plan requirement. This Care Plan has been established and reviewed with the Patient.          Jenny Guzman, [facility]    Identified Health Risks:  I have reviewed Opioid Use Disorder and Substance Use Disorder risk factors and made any needed referrals. The patient was provided with written information regarding signs of hearing loss.

## 2023-11-15 ENCOUNTER — HOSPITAL ENCOUNTER (OUTPATIENT)
Dept: MAMMOGRAPHY | Facility: CLINIC | Age: 78
Discharge: HOME | End: 2023-11-15
Attending: INTERNAL MEDICINE | Admitting: INTERNAL MEDICINE
Payer: COMMERCIAL

## 2023-11-15 ENCOUNTER — LAB (OUTPATIENT)
Dept: LAB | Facility: CLINIC | Age: 78
End: 2023-11-15
Payer: COMMERCIAL

## 2023-11-15 DIAGNOSIS — Z12.31 VISIT FOR SCREENING MAMMOGRAM: ICD-10-CM

## 2023-11-15 DIAGNOSIS — E78.5 HYPERLIPIDEMIA LDL GOAL <100: ICD-10-CM

## 2023-11-15 LAB
ANION GAP SERPL CALCULATED.3IONS-SCNC: 9 MMOL/L (ref 7–15)
BUN SERPL-MCNC: 18.7 MG/DL (ref 8–23)
CALCIUM SERPL-MCNC: 9.4 MG/DL (ref 8.8–10.2)
CHLORIDE SERPL-SCNC: 105 MMOL/L (ref 98–107)
CHOLEST SERPL-MCNC: 170 MG/DL
CREAT SERPL-MCNC: 0.88 MG/DL (ref 0.51–0.95)
DEPRECATED HCO3 PLAS-SCNC: 26 MMOL/L (ref 22–29)
EGFRCR SERPLBLD CKD-EPI 2021: 68 ML/MIN/1.73M2
GLUCOSE SERPL-MCNC: 89 MG/DL (ref 70–99)
HDLC SERPL-MCNC: 74 MG/DL
LDLC SERPL CALC-MCNC: 82 MG/DL
NONHDLC SERPL-MCNC: 96 MG/DL
POTASSIUM SERPL-SCNC: 4.1 MMOL/L (ref 3.4–5.3)
SODIUM SERPL-SCNC: 140 MMOL/L (ref 135–145)
TRIGL SERPL-MCNC: 69 MG/DL

## 2023-11-15 PROCEDURE — 36415 COLL VENOUS BLD VENIPUNCTURE: CPT

## 2023-11-15 PROCEDURE — 77067 SCR MAMMO BI INCL CAD: CPT

## 2023-11-15 PROCEDURE — 80061 LIPID PANEL: CPT

## 2023-11-15 PROCEDURE — 80048 BASIC METABOLIC PNL TOTAL CA: CPT

## 2023-12-10 NOTE — RESULT ENCOUNTER NOTE
The bone density is significantly worsened from 2012.  You are in the category of severe osteopenia.  Recommend that we recheck in 2 years.    You have Osteopenia - low bone density that can lead to osteoporosis, or brittle bones.  It is recommend to have 1200 mg of calcium per day, and at least 800 units of Vitamin D per day.  You can achieve this with a combination pill of Calcium 600 mg+ Vitamin D  400 units twice daily.  You should walk 30 minutes per day and consider light weight lifting.    
No

## 2024-04-26 ENCOUNTER — OFFICE VISIT (OUTPATIENT)
Dept: AUDIOLOGY | Facility: CLINIC | Age: 79
End: 2024-04-26
Payer: COMMERCIAL

## 2024-04-26 DIAGNOSIS — H90.3 SENSORINEURAL HEARING LOSS, ASYMMETRICAL: Primary | ICD-10-CM

## 2024-04-26 DIAGNOSIS — H90.3 BILATERAL SENSORINEURAL HEARING LOSS: ICD-10-CM

## 2024-04-26 DIAGNOSIS — H93.12 LEFT-SIDED TINNITUS: ICD-10-CM

## 2024-04-26 PROCEDURE — 92557 COMPREHENSIVE HEARING TEST: CPT | Performed by: AUDIOLOGIST

## 2024-04-26 PROCEDURE — 92550 TYMPANOMETRY & REFLEX THRESH: CPT | Performed by: AUDIOLOGIST

## 2024-04-26 NOTE — PROGRESS NOTES
AUDIOLOGY REPORT    SUBJECTIVE:  Patty Zambrano is a 79 year old female who was seen in the Audiology Clinic [facility] on 4/26/24 for audiologic evaluation, referred by Dr. Guzman.  The patient has been seen previously in the [place] [facility] on 8/20/2019 for assessment and results indicated bilateral asymmetric sensorineural hearing loss. The patient reports a possible decline in hearing and left ear tinnitus. Patient was unaccompanied to today's visit.     OBJECTIVE:    Otoscopic exam indicates Right ear partly occluded with cerumen, left ear clear     Pure Tone Thresholds assessed using standard techniques  audiometry with good  reliability from 250-8000 Hz bilaterally using insert earphones and circumaural headphones     RIGHT:  normal hearing sensitivity for all frequencies tested with the exception of a mild sensorineural hearing loss at 3000 Hz only    LEFT:    normal hearing sensitivity through 750 Hz then a moderate to profound sensorineural hearing loss   NOTE: Change in transducers did not merit a change in thresholds.     Boris: Negative     Tympanogram:    RIGHT: normal eardrum mobility    LEFT:   hyper mobile     Reflexes (reported by stimulus ear): 1000 Hz  RIGHT: Ipsilateral is present at normal levels  RIGHT: Contralateral is too noisy   LEFT:   Ipsilateral is too noisy  LEFT:   Contralateral is elevated    Speech Reception Threshold:    RIGHT: 10 dB HL    LEFT:   35 dB HL    Word Recognition Score:     RIGHT: 100% at 50 dB HL using NU-6 recorded word list.    LEFT:   44% at 85 dB HL using NU-6 recorded word list.    ASSESSMENT:   Bilateral asymmetric sensorineural hearing loss and left ear tinnitus     Compared to patient's previous audiogram dated 8/20/2019, hearing has remained stable. Today s results were discussed with the patient in detail. Patient was provided a copy of her audiogram.     PLAN:  Patient was counseled regarding hearing loss and impact on  communication.  Patient is a good candidate for CROS amplification at this time. Patient is not interested in pursuing a CROS at this time. It is recommended that the patient return in 2-3 years for monitoring or sooner if concerns arise.  Please call this clinic with questions regarding these results or recommendations.    Terrell Calle CCC-A  Licensed Audiologist #[id number]  4/26/2024    CC: Dr. Guzman

## 2024-06-12 ENCOUNTER — TRANSFERRED RECORDS (OUTPATIENT)
Dept: HEALTH INFORMATION MANAGEMENT | Facility: CLINIC | Age: 79
End: 2024-06-12
Payer: COMMERCIAL

## 2024-07-03 ENCOUNTER — TRANSFERRED RECORDS (OUTPATIENT)
Dept: HEALTH INFORMATION MANAGEMENT | Facility: CLINIC | Age: 79
End: 2024-07-03
Payer: COMMERCIAL

## 2024-07-23 ENCOUNTER — HOSPITAL ENCOUNTER (OUTPATIENT)
Dept: BONE DENSITY | Facility: CLINIC | Age: 79
Discharge: HOME | End: 2024-07-23
Attending: INTERNAL MEDICINE | Admitting: INTERNAL MEDICINE
Payer: COMMERCIAL

## 2024-07-23 DIAGNOSIS — M85.852 OSTEOPENIA OF BOTH HIPS: ICD-10-CM

## 2024-07-23 DIAGNOSIS — M85.851 OSTEOPENIA OF BOTH HIPS: ICD-10-CM

## 2024-07-23 DIAGNOSIS — Z78.0 MENOPAUSE: ICD-10-CM

## 2024-07-23 PROCEDURE — 77081 DXA BONE DENSITY APPENDICULR: CPT

## 2024-07-23 PROCEDURE — 77080 DXA BONE DENSITY AXIAL: CPT

## 2024-07-26 ENCOUNTER — TELEPHONE (OUTPATIENT)
Dept: FAMILY MEDICINE | Facility: CLINIC | Age: 79
End: 2024-07-26
Payer: COMMERCIAL

## 2024-07-26 NOTE — TELEPHONE ENCOUNTER
Reason for Call:  Appointment Request    Patient requesting this type of appt:  result    Requested provider: Jenny Guzman    Reason patient unable to be scheduled: Not with their preferred provider    When does patient want to be seen/preferred time: 1-2 weeks    Comments: tasia would like to go over result. Doesn't have a preference if in person or video    Could we send this information to you in COM [facility] or would you prefer to receive a phone call?:   Patient would prefer a phone call   Okay to leave a detailed message?: Yes at Home number on file [phone] (home)    Call taken on 7/26/2024 at 7:59 AM by Magdalene Rocha

## 2024-07-29 NOTE — TELEPHONE ENCOUNTER
Offered patient a sooner visit with different provider and patient preferred to wait for dr mccullough

## 2024-08-02 DIAGNOSIS — M81.0 OSTEOPOROSIS: Primary | ICD-10-CM

## 2024-08-09 ENCOUNTER — LAB (OUTPATIENT)
Dept: LAB | Facility: CLINIC | Age: 79
End: 2024-08-09
Payer: COMMERCIAL

## 2024-08-09 DIAGNOSIS — M81.0 OSTEOPOROSIS: ICD-10-CM

## 2024-08-09 LAB
ALP SERPL-CCNC: 61 U/L (ref 40–150)
CALCIUM SERPL-MCNC: 10.1 MG/DL (ref 8.8–10.4)
PTH-INTACT SERPL-MCNC: 31 PG/ML (ref 15–65)
VIT D+METAB SERPL-MCNC: 50 NG/ML (ref 20–50)

## 2024-08-09 PROCEDURE — 82306 VITAMIN D 25 HYDROXY: CPT

## 2024-08-09 PROCEDURE — 83970 ASSAY OF PARATHORMONE: CPT

## 2024-08-09 PROCEDURE — 82310 ASSAY OF CALCIUM: CPT

## 2024-08-09 PROCEDURE — 84075 ASSAY ALKALINE PHOSPHATASE: CPT

## 2024-08-09 PROCEDURE — 36415 COLL VENOUS BLD VENIPUNCTURE: CPT

## 2024-08-27 ENCOUNTER — TRANSFERRED RECORDS (OUTPATIENT)
Dept: HEALTH INFORMATION MANAGEMENT | Facility: CLINIC | Age: 79
End: 2024-08-27
Payer: COMMERCIAL

## 2024-09-07 ENCOUNTER — HOSPITAL ENCOUNTER (EMERGENCY)
Facility: CLINIC | Age: 79
Discharge: HOME | End: 2024-09-07
Attending: EMERGENCY MEDICINE | Admitting: EMERGENCY MEDICINE
Payer: COMMERCIAL

## 2024-09-07 VITALS
DIASTOLIC BLOOD PRESSURE: 74 MMHG | HEART RATE: 77 BPM | SYSTOLIC BLOOD PRESSURE: 141 MMHG | RESPIRATION RATE: 18 BRPM | TEMPERATURE: 98.1 F | OXYGEN SATURATION: 97 %
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
## 2024-09-07 DIAGNOSIS — S09.90XA CLOSED HEAD INJURY, INITIAL ENCOUNTER: ICD-10-CM

## 2024-09-07 DIAGNOSIS — S80.11XA CONTUSION OF MULTIPLE SITES OF RIGHT LOWER EXTREMITY, INITIAL ENCOUNTER: ICD-10-CM

## 2024-09-07 PROCEDURE — 99283 EMERGENCY DEPT VISIT LOW MDM: CPT | Performed by: EMERGENCY MEDICINE

## 2024-09-07 ASSESSMENT — ACTIVITIES OF DAILY LIVING (ADL): ADLS_ACUITY_SCORE: 35

## 2024-09-07 ASSESSMENT — COLUMBIA-SUICIDE SEVERITY RATING SCALE - C-SSRS
6. HAVE YOU EVER DONE ANYTHING, STARTED TO DO ANYTHING, OR PREPARED TO DO ANYTHING TO END YOUR LIFE?: NO
2. HAVE YOU ACTUALLY HAD ANY THOUGHTS OF KILLING YOURSELF IN THE PAST MONTH?: NO
1. IN THE PAST MONTH, HAVE YOU WISHED YOU WERE DEAD OR WISHED YOU COULD GO TO SLEEP AND NOT WAKE UP?: NO

## 2024-09-07 NOTE — ED PROVIDER NOTES
ED Provider Note  [facility]      History     Chief Complaint   Patient presents with    Fall     Pt reports she fell around 11am off of step ladder. Reports her head hit the toilet and knot on right leg, nose bleed.     HPI  Patty Zambrano is a 79 year old female who presents to the emergency department with concerns regarding right leg pain, in addition to slight left-sided frontal headache/head pain in the context of a fall off of a 2 foot step stool.  This occurred about 4 hours ago.  Patient had a fall when patient had been attempting to clean around the toilet.  The step ladder slipped out, and subsequently the patient hit the toilet seat.  Had immediate left-sided nosebleed.  Had slight left-sided head contusion as well.  Patient was able to eat lunch after this incident.  Patient has not had any nausea, or vomiting.  No severe headache currently.  Does have contusion of the right leg, with abrasion of the right lower extremity.  Ambulatory without difficulty.        Independent Historian:        Review of External Notes:  Reviewed urgent care note      Allergies:  Allergies   Allergen Reactions    Amoxicillin-Pot Clavulanate Diarrhea       Problem List:    Patient Active Problem List    Diagnosis Date Noted    Seasonal affective disorder (H24) 10/08/2021     Priority: Medium    Spinal stenosis of lumbar region 10/08/2021     Priority: Medium     Follows with [facility].  Did physical therapy 7/2021; gabapentin helpful.  3 JUS somewhat helpful.  Surgery offered, patient deferring    MRI 10/2020  1. Left foraminal disc herniation at L5-S1 contributing to severe left  foraminal stenosis at L5-S1.  2. Severe bilateral facet arthropathy at L4-L5 with grade 1  anterolisthesis of L4 on L5.      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  Declined hearing aide.  Chronic tinnitus       Sigmoid diverticulitis 08/26/2016      Priority: Medium     Episode in 2006 or so, then 2015 and 2016, 12/2017.      Cataract 12/29/2015     Priority: Medium    Hyperlipidemia LDL goal <100 10/31/2010     Priority: Medium     CT calcium score 11/22 50; statin started      Hypovitaminosis D 04/07/2010     Priority: Medium    Osteopenia of both hips 03/19/2009     Priority: Medium     Recommend follow-up 10/2023      Sinusitis, chronic 02/18/2008     Priority: Medium     MRI 2003 positive for sinus disease.  Problem list name updated by automated process. Provider to review      Postmenopausal atrophic vaginitis 01/15/2007     Priority: Medium        Past Medical History:    Past Medical History:   Diagnosis Date    Abnormal Papanicolaou smear of cervix and cervical HPV 1978    Arthritis 2005    Brachial neuritis or radiculitis NOS     Depressive disorder     Diverticulitis of colon (without mention of hemorrhage)(562.11) 05/2004    Postmenopausal atrophic vaginitis     Raynaud's syndrome        Past Surgical History:    Past Surgical History:   Procedure Laterality Date    BACK SURGERY  2020, 2021    Epidural steroid injections X 3    COLONOSCOPY  04/03/2014    Procedure: COLONOSCOPY;  Colonoscopy;  Surgeon: Richie Kwon MD;  Location: [facility]    COLONOSCOPY N/A 12/15/2016    Procedure: COLONOSCOPY;  Surgeon: Richie Kwon MD;  Location: [facility]    EYE SURGERY  03/2017    laser    HC DILATION/CURETTAGE DIAG/THER NON OB      ORTHOPEDIC SURGERY  2005    Right knee arthroscopy    SURGICAL HISTORY OF -   05/2005    right knee arthroscopy    VASCULAR SURGERY  1990    Sclerotherapy bilateral leg veins    [facility] COLONOSCOPY THRU STOMA, DIAGNOSTIC  11/2003    due 10 years       Family History:    Family History   Problem Relation Age of Onset    Hypertension Mother     Osteoporosis Mother     Arthritis Father     Alzheimer Disease Father     Cancer Maternal Grandmother         abdominal CA    Other Cancer Maternal Grandmother     Heart Disease Maternal Grandfather      Diabetes Maternal Grandfather     Alcohol/Drug Paternal Grandfather     Arrhythmia Brother     Other Cancer Brother         Anaplastic sarcoma       Social History:  Marital Status:   [2]  Social History     Tobacco Use    Smoking status: Never    Smokeless tobacco: Never   Vaping Use    Vaping status: Never Used   Substance Use Topics    Alcohol use: Yes     Comment: Rarely    Drug use: No        Medications:    FISH OIL  atorvastatin (LIPITOR) 20 MG tablet  CALCIUM + D 600-200 MG-UNIT OR TABS  Cholecalciferol (VITAMIN D) 2000 UNITS tablet  cycloSPORINE (RESTASIS) 0.05 % ophthalmic emulsion  gabapentin (NEURONTIN) 300 MG capsule  GLUCOSAMINE CHONDRO COMPLEX 500-400 MG OR TABS  MELATONIN PO  METAMUCIL OR  minoxidil (ROGAINE) 5 % external solution  MULTIVITAMIN OR  naproxen sodium 220 MG capsule  Probiotic Product (PROBIOTIC ACIDOPHILUS) CAPS  sertraline (ZOLOFT) 25 MG tablet          Review of Systems  A medically appropriate review of systems was performed with pertinent positives and negatives noted in the HPI, and all other systems negative.    Physical Exam   Patient Vitals for the past 24 hrs:   BP Temp Temp src Pulse Resp SpO2   09/07/24 1505 (!) 155/70 98.1  F (36.7  C) Oral 77 18 97 %          Physical Exam  General: alert and in no acute distress on arrival  Head: atraumatic, normocephalic.  No external signs of trauma.  No bruising noted.  No tenderness of the forehead, or scalp  Neck: Normal.  Normal range of motion.  No midline neck tenderness  Lungs:  nonlabored  CV:  extremities warm and perfused  Abd: nondistended  Skin: Right leg with abrasions of the anterior shin  Neuro: Patient awake, alert, speech is fluent,   Psychiatric: affect/mood normal,        ED Course                 Procedures         Right leg with abrasions of the anterior shin                 No results found for this or any previous visit (from the past 24 hour(s)).    MEDICATIONS GIVEN IN THE EMERGENCY  DEPARTMENT:  Medications - No data to display        Independent Interpretation (X-rays, CTs, rhythm strip):  None    Consultations/Discussion of Management or Tests:  None       Social Determinants of Health affecting care:         Assessments & Plan (with Medical Decision Making)  79 year old female who presents to the Emergency Department for evaluation of head contusion, in addition to right lower extremity pain in the context of a fall which occurred about 4 hours ago.  Did bump her head.  There was no loss of consciousness.  Did have left-sided nosebleed, however that resolved.  Patient with normal vitals, and normal neurologic exam upon arrival.    Given the nonfocal/normal neurologic exam without current headache, I do not feel CT scan of the head is indicated.  She is ambulatory without significant severe pain of the right leg, and therefore no indication for x-ray imaging of the leg.    Patient will be discharged home, ice for swelling and comfort, and follow-up in clinic as needed.       I have reviewed the nursing notes.    I have reviewed the findings, diagnosis, plan and need for follow up with the patient.       Critical Care time:  none      NEW PRESCRIPTIONS STARTED AT TODAY'S ER VISIT  New Prescriptions    No medications on file       Final diagnoses:   Fall, initial encounter   Closed head injury, initial encounter   Contusion of multiple sites of right lower extremity, initial encounter       9/7/2024   [facility] EMERGENCY DEPT       Everette Yoon MD  09/07/24 [id number]

## 2024-09-07 NOTE — DISCHARGE INSTRUCTIONS
Tylenol and ibuprofen as needed for pain.  Ice for swelling and comfort.    Follow-up in clinic as needed.    Return or be seen if new or worsening symptoms develop

## 2024-09-14 ENCOUNTER — TRANSFERRED RECORDS (OUTPATIENT)
Dept: HEALTH INFORMATION MANAGEMENT | Facility: CLINIC | Age: 79
End: 2024-09-14
Payer: COMMERCIAL

## 2024-11-06 DIAGNOSIS — F33.8 SEASONAL AFFECTIVE DISORDER (H): ICD-10-CM

## 2024-11-06 RX ORDER — SERTRALINE HYDROCHLORIDE 25 MG/1
25 TABLET, FILM COATED ORAL DAILY
Qty: 90 TABLET | Refills: 0 | Status: SHIPPED | OUTPATIENT
Start: 2024-11-06 | End: 2024-11-12

## 2024-11-08 SDOH — HEALTH STABILITY: PHYSICAL HEALTH: ON AVERAGE, HOW MANY MINUTES DO YOU ENGAGE IN EXERCISE AT THIS LEVEL?: 150+ MIN

## 2024-11-08 SDOH — HEALTH STABILITY: PHYSICAL HEALTH: ON AVERAGE, HOW MANY DAYS PER WEEK DO YOU ENGAGE IN MODERATE TO STRENUOUS EXERCISE (LIKE A BRISK WALK)?: 5 DAYS

## 2024-11-08 ASSESSMENT — SOCIAL DETERMINANTS OF HEALTH (SDOH): HOW OFTEN DO YOU GET TOGETHER WITH FRIENDS OR RELATIVES?: TWICE A WEEK

## 2024-11-12 ENCOUNTER — OFFICE VISIT (OUTPATIENT)
Dept: FAMILY MEDICINE | Facility: CLINIC | Age: 79
End: 2024-11-12
Attending: INTERNAL MEDICINE
Payer: COMMERCIAL

## 2024-11-12 VITALS
HEIGHT: 67 IN | OXYGEN SATURATION: 98 % | DIASTOLIC BLOOD PRESSURE: 80 MMHG | TEMPERATURE: 97.2 F | HEART RATE: 78 BPM | BODY MASS INDEX: 23.23 KG/M2 | RESPIRATION RATE: 14 BRPM | SYSTOLIC BLOOD PRESSURE: 114 MMHG | WEIGHT: 148 LBS

## 2024-11-12 DIAGNOSIS — E78.5 HYPERLIPIDEMIA LDL GOAL <100: ICD-10-CM

## 2024-11-12 DIAGNOSIS — M48.061 SPINAL STENOSIS OF LUMBAR REGION, UNSPECIFIED WHETHER NEUROGENIC CLAUDICATION PRESENT: ICD-10-CM

## 2024-11-12 DIAGNOSIS — F33.8 SEASONAL AFFECTIVE DISORDER (H): ICD-10-CM

## 2024-11-12 DIAGNOSIS — M81.0 AGE-RELATED OSTEOPOROSIS WITHOUT CURRENT PATHOLOGICAL FRACTURE: ICD-10-CM

## 2024-11-12 DIAGNOSIS — Z00.00 ENCOUNTER FOR MEDICARE ANNUAL WELLNESS EXAM: Primary | ICD-10-CM

## 2024-11-12 LAB
ANION GAP SERPL CALCULATED.3IONS-SCNC: 7 MMOL/L (ref 7–15)
BUN SERPL-MCNC: 19 MG/DL (ref 8–23)
CALCIUM SERPL-MCNC: 9.6 MG/DL (ref 8.8–10.4)
CHLORIDE SERPL-SCNC: 103 MMOL/L (ref 98–107)
CHOLEST SERPL-MCNC: 173 MG/DL
CREAT SERPL-MCNC: 0.89 MG/DL (ref 0.51–0.95)
EGFRCR SERPLBLD CKD-EPI 2021: 66 ML/MIN/1.73M2
FASTING STATUS PATIENT QL REPORTED: YES
FASTING STATUS PATIENT QL REPORTED: YES
GLUCOSE SERPL-MCNC: 97 MG/DL (ref 70–99)
HCO3 SERPL-SCNC: 28 MMOL/L (ref 22–29)
HDLC SERPL-MCNC: 74 MG/DL
LDLC SERPL CALC-MCNC: 87 MG/DL
NONHDLC SERPL-MCNC: 99 MG/DL
POTASSIUM SERPL-SCNC: 4.5 MMOL/L (ref 3.4–5.3)
SODIUM SERPL-SCNC: 138 MMOL/L (ref 135–145)
TRIGL SERPL-MCNC: 62 MG/DL

## 2024-11-12 PROCEDURE — 99214 OFFICE O/P EST MOD 30 MIN: CPT | Mod: 25 | Performed by: INTERNAL MEDICINE

## 2024-11-12 PROCEDURE — 80061 LIPID PANEL: CPT | Performed by: INTERNAL MEDICINE

## 2024-11-12 PROCEDURE — G0439 PPPS, SUBSEQ VISIT: HCPCS | Performed by: INTERNAL MEDICINE

## 2024-11-12 PROCEDURE — 80048 BASIC METABOLIC PNL TOTAL CA: CPT | Performed by: INTERNAL MEDICINE

## 2024-11-12 PROCEDURE — 36415 COLL VENOUS BLD VENIPUNCTURE: CPT | Performed by: INTERNAL MEDICINE

## 2024-11-12 RX ORDER — ALENDRONATE SODIUM 70 MG/1
70 TABLET ORAL
Qty: 12 TABLET | Refills: 3 | Status: SHIPPED | OUTPATIENT
Start: 2024-11-12

## 2024-11-12 RX ORDER — ATORVASTATIN CALCIUM 20 MG/1
20 TABLET, FILM COATED ORAL DAILY
Qty: 90 TABLET | Refills: 3 | Status: SHIPPED | OUTPATIENT
Start: 2024-11-12

## 2024-11-12 RX ORDER — GABAPENTIN 300 MG/1
300 CAPSULE ORAL 2 TIMES DAILY
Qty: 180 CAPSULE | Refills: 3 | Status: SHIPPED | OUTPATIENT
Start: 2024-11-12

## 2024-11-12 RX ORDER — SERTRALINE HYDROCHLORIDE 25 MG/1
25 TABLET, FILM COATED ORAL DAILY
Qty: 90 TABLET | Refills: 3 | Status: SHIPPED | OUTPATIENT
Start: 2024-11-12

## 2024-11-12 ASSESSMENT — PAIN SCALES - GENERAL: PAINLEVEL_OUTOF10: NO PAIN (0)

## 2024-11-12 ASSESSMENT — ANXIETY QUESTIONNAIRES
1. FEELING NERVOUS, ANXIOUS, OR ON EDGE: NOT AT ALL
5. BEING SO RESTLESS THAT IT IS HARD TO SIT STILL: NOT AT ALL
3. WORRYING TOO MUCH ABOUT DIFFERENT THINGS: SEVERAL DAYS
IF YOU CHECKED OFF ANY PROBLEMS ON THIS QUESTIONNAIRE, HOW DIFFICULT HAVE THESE PROBLEMS MADE IT FOR YOU TO DO YOUR WORK, TAKE CARE OF THINGS AT HOME, OR GET ALONG WITH OTHER PEOPLE: NOT DIFFICULT AT ALL
GAD7 TOTAL SCORE: 3
2. NOT BEING ABLE TO STOP OR CONTROL WORRYING: NOT AT ALL
7. FEELING AFRAID AS IF SOMETHING AWFUL MIGHT HAPPEN: NOT AT ALL
6. BECOMING EASILY ANNOYED OR IRRITABLE: SEVERAL DAYS
GAD7 TOTAL SCORE: 3

## 2024-11-12 ASSESSMENT — PATIENT HEALTH QUESTIONNAIRE - PHQ9: 5. POOR APPETITE OR OVEREATING: SEVERAL DAYS

## 2024-11-12 NOTE — PATIENT INSTRUCTIONS
Spinal stenosis  Refill of gabapentin  Take aleve with food as you already are doing    Muscle cramps  Stop the atorvastatin for 2 weeks and see if muscle cramps improve  Send me a My Chart message after stopping if we need to switch to different med    Severe Osteopenia  We discussed risk and benefit of medication  I would recommend to start Alendronate (Fosamax)  We will check bone density test in 2 years    You should start therapy for osteoporosis with a bisphosphonate (Actonel, Fosamax or Reclast).    --Your risk of hip or spine fracture is elevated.  We can calculate your individual risk.  --The most common side effect of bisphosphonates is stomach irritation (less than 7%). Many people with history of GERD or ulcers can tolerate these medications fine.  There are specific instructions on how to take the medication and this reduces the chance of GI irritation.  --Another common side effects is muscle/bone pain (less than 6%).    Very rare side effects of bisphosphonates include osteonecrosis of the jaw and atypical thigh (Femur) fractures.  --risk of osteonecrosis of the jaw (1/200 after tooth extraction, 1/2500 spontaneous) and atypical femur fractures (1/1000) with chronic bisphosphonates. For every atypical femur fracture, 100 typical femur fractures are prevented.         Patient Education   Preventive Care Advice   This is general advice given by our system to help you stay healthy. However, your care team may have specific advice just for you. Please talk to your care team about your preventive care needs.  Nutrition  Eat 5 or more servings of fruits and vegetables each day.  Try wheat bread, brown rice and whole grain pasta (instead of white bread, rice, and pasta).  Get enough calcium and vitamin D. Check the label on foods and aim for 100% of the RDA (recommended daily allowance).  Lifestyle  Exercise at least 150 minutes each week  (30 minutes a day, 5 days a week).  Do muscle strengthening  activities 2 days a week. These help control your weight and prevent disease.  No smoking.  Wear sunscreen to prevent skin cancer.  Have a dental exam and cleaning every 6 months.  Yearly exams  See your health care team every year to talk about:  Any changes in your health.  Any medicines your care team has prescribed.  Preventive care, family planning, and ways to prevent chronic diseases.  Shots (vaccines)   HPV shots (up to age 26), if you've never had them before.  Hepatitis B shots (up to age 59), if you've never had them before.  COVID-19 shot: Get this shot when it's due.  Flu shot: Get a flu shot every year.  Tetanus shot: Get a tetanus shot every 10 years.  Pneumococcal, hepatitis A, and RSV shots: Ask your care team if you need these based on your risk.  Shingles shot (for age 50 and up)  General health tests  Diabetes screening:  Starting at age 35, Get screened for diabetes at least every 3 years.  If you are younger than age 35, ask your care team if you should be screened for diabetes.  Cholesterol test: At age 39, start having a cholesterol test every 5 years, or more often if advised.  Bone density scan (DEXA): At age 50, ask your care team if you should have this scan for osteoporosis (brittle bones).  Hepatitis C: Get tested at least once in your life.  STIs (sexually transmitted infections)  Before age 24: Ask your care team if you should be screened for STIs.  After age 24: Get screened for STIs if you're at risk. You are at risk for STIs (including HIV) if:  You are sexually active with more than one person.  You don't use condoms every time.  You or a partner was diagnosed with a sexually transmitted infection.  If you are at risk for HIV, ask about PrEP medicine to prevent HIV.  Get tested for HIV at least once in your life, whether you are at risk for HIV or not.  Cancer screening tests  Cervical cancer screening: If you have a cervix, begin getting regular cervical cancer screening tests  starting at age 21.  Breast cancer scan (mammogram): If you've ever had breasts, begin having regular mammograms starting at age 40. This is a scan to check for breast cancer.  Colon cancer screening: It is important to start screening for colon cancer at age 45.  Have a colonoscopy test every 10 years (or more often if you're at risk) Or, ask your provider about stool tests like a FIT test every year or Cologuard test every 3 years.  To learn more about your testing options, visit:   .  For help making a decision, visit:   https://bit.ly/jw55373.  Prostate cancer screening test: If you have a prostate, ask your care team if a prostate cancer screening test (PSA) at age 55 is right for you.  Lung cancer screening: If you are a current or former smoker ages 50 to 80, ask your care team if ongoing lung cancer screenings are right for you.  For informational purposes only. Not to replace the advice of your health care provider. Copyright   2023 [facility] [employer]. All rights reserved. Clinically reviewed by the [facility]. [employer] [id number] - REV 01/24.  Learning About Activities of Daily Living  What are activities of daily living?     Activities of daily living (ADLs) are the basic self-care tasks you do every day. These include eating, bathing, dressing, and moving around.  As you age, and if you have health problems, you may find that it's harder to do some of these tasks. If so, your doctor can suggest ideas that may help.  To measure what kind of help you may need, your doctor will ask how well you are able to do ADLs. Let your doctor know if there are any tasks that you are having trouble doing. This is an important first step to getting help. And when you have the help you need, you can stay as independent as possible.  How will a doctor assess your ADLs?  Asking about ADLs is part of a routine health checkup your doctor will likely do as you age. Your health check might be done  in a doctor's office, in your home, or at a hospital. The goal is to find out if you are having any problems that could make it hard to care for yourself or that make it unsafe for you to be on your own.  To measure your ADLs, your doctor will ask how hard it is for you to do routine tasks. Your doctor may also want to know if you have changed the way you do a task because of a health problem. Your doctor may watch how you:  Walk back and forth.  Keep your balance while you stand or walk.  Move from sitting to standing or from a bed to a chair.  Button or unbutton a shirt or sweater.  Remove and put on your shoes.  It's common to feel a little worried or anxious if you find you can't do all the things you used to be able to do. Talking with your doctor about ADLs is a way to make sure you're as safe as possible and able to care for yourself as well as you can. You may want to bring a caregiver, friend, or family member to your checkup. They can help you talk to your doctor.  Follow-up care is a key part of your treatment and safety. Be sure to make and go to all appointments, and call your doctor if you are having problems. It's also a good idea to know your test results and keep a list of the medicines you take.  Current as of: October 24, 2023  Content Version: 14.2 2024 [facility] [employer].   Care instructions adapted under license by your healthcare professional. If you have questions about a medical condition or this instruction, always ask your healthcare professional. [employer] disclaims any warranty or liability for your use of this information.    Preventing Falls: Care Instructions  Injuries and health problems such as trouble walking or poor eyesight can increase your risk of falling. So can some medicines. But there are things you can do to help prevent falls. You can exercise to get stronger. You can also arrange your home to make it safer.    Talk to your doctor about the medicines  "you take. Ask if any of them increase the risk of falls and whether they can be changed or stopped.   Try to exercise regularly. It can help improve your strength and balance. This can help lower your risk of falling.         Practice fall safety and prevention.   Wear low-heeled shoes that fit well and give your feet good support. Talk to your doctor if you have foot problems that make this hard.  Carry a cellphone or wear a medical alert device that you can use to call for help.  Use stepladders instead of chairs to reach high objects. Don't climb if you're at risk for falls. Ask for help, if needed.  Wear the correct eyeglasses, if you need them.        Make your home safer.   Remove rugs, cords, clutter, and furniture from walkways.  Keep your house well lit. Use night-lights in hallways and bathrooms.  Install and use sturdy handrails on stairways.  Wear nonskid footwear, even inside. Don't walk barefoot or in socks without shoes.        Be safe outside.   Use handrails, curb cuts, and ramps whenever possible.  Keep your hands free by using a shoulder bag or backpack.  Try to walk in well-lit areas. Watch out for uneven ground, changes in pavement, and debris.  Be careful in the winter. Walk on the grass or gravel when sidewalks are slippery. Use de-icer on steps and walkways. Add non-slip devices to shoes.    Put grab bars and nonskid mats in your shower or tub and near the toilet. Try to use a shower chair or bath bench when bathing.   Get into a tub or shower by putting in your weaker leg first. Get out with your strong side first. Have a phone or medical alert device in the bathroom with you.   Where can you learn more?  Go to https://www.YES.TAP.net/patiented  Enter G117 in the search box to learn more about \"Preventing Falls: Care Instructions.\"  Current as of: July 17, 2023  Content Version: 14.2 2024 [employer].   Care instructions adapted under license by your healthcare professional. " If you have questions about a medical condition or this instruction, always ask your healthcare professional. Health[employer], [facility] disclaims any warranty or liability for your use of this information.    Hearing Loss: Care Instructions  Overview     Hearing loss is a sudden or slow decrease in how well you hear. It can range from slight to profound. Permanent hearing loss can occur with aging. It also can happen when you are exposed long-term to loud noise. Examples include listening to loud music, riding motorcycles, or being around other loud machines.  Hearing loss can affect your work and home life. It can make you feel lonely or depressed. You may feel that you have lost your independence. But hearing aids and other devices can help you hear better and feel connected to others.  Follow-up care is a key part of your treatment and safety. Be sure to make and go to all appointments, and call your doctor if you are having problems. It's also a good idea to know your test results and keep a list of the medicines you take.  How can you care for yourself at home?  Avoid loud noises whenever possible. This helps keep your hearing from getting worse.  Always wear hearing protection around loud noises.  Wear a hearing aid as directed.  A professional can help you pick a hearing aid that will work best for you.  You can also get hearing aids over the counter for mild to moderate hearing loss.  Have hearing tests as your doctor suggests. They can show whether your hearing has changed. Your hearing aid may need to be adjusted.  Use other devices as needed. These may include:  Telephone amplifiers and hearing aids that can connect to a television, stereo, radio, or microphone.  Devices that use lights or vibrations. These alert you to the doorbell, a ringing telephone, or a baby monitor.  Television closed-captioning. This shows the words at the bottom of the screen. Most new TVs can do this.  TTY (text telephone). This  "lets you type messages back and forth on the telephone instead of talking or listening. These devices are also called TDD. When messages are typed on the keyboard, they are sent over the phone line to a receiving TTY. The message is shown on a monitor.  Use text messaging, social media, and email if it is hard for you to communicate by telephone.  Try to learn a listening technique called speechreading. It is not lipreading. You pay attention to people's gestures, expressions, posture, and tone of voice. These clues can help you understand what a person is saying. Face the person you are talking to, and have them face you. Make sure the lighting is good. You need to see the other person's face clearly.  Think about counseling if you need help to adjust to your hearing loss.  When should you call for help?  Watch closely for changes in your health, and be sure to contact your doctor if:    You think your hearing is getting worse.     You have new symptoms, such as dizziness or nausea.   Where can you learn more?  Go to https://www.[employer].net/patiented  Enter R798 in the search box to learn more about \"Hearing Loss: Care Instructions.\"  Current as of: September 27, 2023  Content Version: 14.2 2024 [facility] [employer], [facility].   Care instructions adapted under license by your healthcare professional. If you have questions about a medical condition or this instruction, always ask your healthcare professional. [employer] disclaims any warranty or liability for your use of this information.       "

## 2024-11-12 NOTE — PROGRESS NOTES
Preventive Care Visit  [facility]  Jenny Guzman DO, Internal Medicine  Nov 12, 2024      Assessment & Plan     Encounter for Medicare annual wellness exam    Hyperlipidemia LDL goal <100   - stable, refill provided.  If cramps persist, stop med x 2 weeks and update me via my chart  - Lipid panel reflex to direct LDL Fasting; Future  - atorvastatin (LIPITOR) 20 MG tablet; Take 1 tablet (20 mg) by mouth daily.  - Basic metabolic panel  (Ca, Cl, CO2, Creat, Gluc, K, Na, BUN); Future    Seasonal affective disorder (H)   - stable, refill provided  - sertraline (ZOLOFT) 25 MG tablet; Take 1 tablet (25 mg) by mouth daily.    Age-related osteoporosis without current pathological fracture  Long discussion.  Discussed risk and benefit of medication and I recommended she start the medication and she is agreeable.  Follow-up bone density in 2 years  - Basic metabolic panel  (Ca, Cl, CO2, Creat, Gluc, K, Na, BUN); Future  - alendronate (FOSAMAX) 70 MG tablet; Take 1 tablet (70 mg) by mouth every 7 days.    Spinal stenosis of lumbar region, unspecified whether neurogenic claudication present   - stable, refill provided  - gabapentin (NEURONTIN) 300 MG capsule; Take 1 capsule (300 mg) by mouth 2 times daily.    Patient has been advised of split billing requirements and indicates understanding: Yes        Counseling  Appropriate preventive services were addressed with this patient via screening, questionnaire, or discussion as appropriate for fall prevention, nutrition, physical activity, Tobacco-use cessation, social engagement, weight loss and cognition.  Checklist reviewing preventive services available has been given to the patient.  Reviewed patient's diet, addressing concerns and/or questions.   She is at risk for psychosocial distress and has been provided with information to reduce risk.   Patient reported safety concerns were addressed today.The patient was provided with written  information regarding signs of hearing loss.           Subjective   Patty is a 79 year old, presenting for the following:  Lipids, Depression, Anxiety, AWV, and Medication Request (gabapentin (NEURONTIN) 300 MG capsule would like for you to take over this medication )        11/12/2024     8:08 AM   Additional Questions   Roomed by иван   Accompanied by self         11/12/2024     8:08 AM   Patient Reported Additional Medications   Patient reports taking the following new medications none             HPI      Chief Complaint   Patient presents with    Lipids    Depression    Anxiety    AWV    Medication Request     gabapentin (NEURONTIN) 300 MG capsule would like for you to take over this medication      Osteopenia  --she saw a bone health doc - Dr. nettles through [facility]  --the plan was to repeat DEXA in 2 years, increase weight bearing exercise, ensure Ca+D.  She reports the doc did not strongly recommend a Rx med  --dentist diagnosed her with TMJ; no plans for major dental work    Dexa 7/24  -RIGHT Hip Total: BMD: 0.766 g/cm2. T-score: -1.9. Z-score: -0.1.  -RIGHT Hip Femoral neck: BMD: 0.743 g/cm2. T-score: -2.1. Z-score: -0.2.  -LEFT Hip Total: BMD: 0.757 g/cm2. T-score: -2.0. Z-score: -0.2.  -LEFT Hip Femoral neck: BMD: 0.707 g/cm2. T-score: -2.4. Z-score: -0.4.  -LEFT Radius 33%: BMD: 0.558 g/cm2. T-score: -2.2. Z-score: 0.3.  -FRAX Results: The 10 year probability of major osteoporotic fracture is 17.2%, and of hip fracture is 5.6%, based on left femoral neck BMD.     Lumbar spinal stenosis  --ortho spine has been Rx gabapentin 300 mg BID, aleve 220 BID  --has done trial off med and found the gabapentin is very helpful    Hyperlipidemia Follow-Up    Are you regularly taking any medication or supplement to lower your cholesterol?   Yes- pm  Are you having muscle aches or other side effects that you think could be caused by your cholesterol lowering medication?  Yes- cramps in the middle of night worse in the  summer then the winter in the legs   Occurring almost nightly over the summer, the last 2 weeks has not been bothersome    Depression and Anxiety   How are you doing with your depression since your last visit? No change  How are you doing with your anxiety since your last visit?  No change  Are you having other symptoms that might be associated with depression or anxiety? No  Have you had a significant life event? Grief or Loss   Do you have any concerns with your use of alcohol or other drugs? No    Social History     Tobacco Use    Smoking status: Never    Smokeless tobacco: Never   Vaping Use    Vaping status: Never Used   Substance Use Topics    Alcohol use: Yes     Comment: Rarely    Drug use: No         10/8/2021     9:15 AM 10/11/2022     9:25 AM 3/21/2023     9:16 AM   PHQ   PHQ-9 Total Score 0 1 0   Q9: Thoughts of better off dead/self-harm past 2 weeks Not at all Not at all  Not at all       Patient-reported         10/8/2021     9:15 AM 10/11/2022     9:29 AM 11/12/2024     9:13 AM   SONYA-7 SCORE   Total Score 6 6 3         3/21/2023     9:16 AM   Last PHQ-9   1.  Little interest or pleasure in doing things 0   2.  Feeling down, depressed, or hopeless 0   3.  Trouble falling or staying asleep, or sleeping too much 0   4.  Feeling tired or having little energy 0   5.  Poor appetite or overeating 0   6.  Feeling bad about yourself 0   7.  Trouble concentrating 0   8.  Moving slowly or restless 0   Q9: Thoughts of better off dead/self-harm past 2 weeks 0   PHQ-9 Total Score 0   Difficulty at work, home, or with people Not difficult at all         11/12/2024     9:13 AM   SONYA-7    1. Feeling nervous, anxious, or on edge 0   2. Not being able to stop or control worrying 0   3. Worrying too much about different things 1   4. Trouble relaxing 1   5. Being so restless that it is hard to sit still 0   6. Becoming easily annoyed or irritable 1   7. Feeling afraid, as if something awful might happen 0   SONYA-7 Total  Score 3   If you checked any problems, how difficult have they made it for you to do your work, take care of things at home, or get along with other people? Not difficult at all       Suicide Assessment Five-step Evaluation and Treatment (SAFE-T)      Health Care Directive  Patient has a Health Care Directive on file  Advance care planning document is on file and is current.      11/8/2024   General Health   How would you rate your overall physical health? Good   Feel stress (tense, anxious, or unable to sleep) Only a little      (!) STRESS CONCERN      11/8/2024   Nutrition   Diet: Regular (no restrictions)            11/8/2024   Exercise   Days per week of moderate/strenous exercise 5 days   Average minutes spent exercising at this level 150+ min            11/8/2024   Social Factors   Frequency of gathering with friends or relatives Twice a week   Worry food won't last until get money to buy more No   Food not last or not have enough money for food? No   Do you have housing? (Housing is defined as stable permanent housing and does not include staying ouside in a car, in a tent, in an abandoned building, in an overnight shelter, or couch-surfing.) Yes   Are you worried about losing your housing? No   Lack of transportation? No   Unable to get utilities (heat,electricity)? No            11/12/2024   Fall Risk   Gait Speed Test (Document in seconds) 3.25   Gait Speed Test Interpretation Less than or equal to 5.00 seconds - PASS               11/8/2024   Activities of Daily Living- Home Safety   Needs help with the following daily activites None of the above   Safety concerns in the home No grab bars in the bathroom            11/8/2024   Dental   Dentist two times every year? Yes            11/8/2024   Hearing Screening   Hearing concerns? (!) I NEED TO ASK PEOPLE TO SPEAK UP OR REPEAT THEMSELVES.    (!) IT'S HARD TO FOLLOW A CONVERSATION IN A NOISY RESTAURANT OR CROWDED ROOM.       Multiple values from one day are  sorted in reverse-chronological order         11/8/2024   Driving Risk Screening   Patient/family members have concerns about driving No            11/8/2024   General Alertness/Fatigue Screening   Have you been more tired than usual lately? No            11/8/2024   Urinary Incontinence Screening   Bothered by leaking urine in past 6 months No            11/8/2024   TB Screening   Were you born outside of the [place]? No            Today's PHQ-2 Score:       11/12/2024     8:06 AM   PHQ-2 ( 1999 Pfizer)   Q1: Little interest or pleasure in doing things 0    Q2: Feeling down, depressed or hopeless 0    PHQ-2 Score 0    Q1: Little interest or pleasure in doing things Not at all   Q2: Feeling down, depressed or hopeless Not at all   PHQ-2 Score 0       Patient-reported           11/8/2024   Substance Use   Alcohol more than 3/day or more than 7/wk No   Do you have a current opioid prescription? No   How severe/bad is pain from 1 to 10? 2/10   Do you use any other substances recreationally? No        Social History     Tobacco Use    Smoking status: Never    Smokeless tobacco: Never   Vaping Use    Vaping status: Never Used   Substance Use Topics    Alcohol use: Yes     Comment: Rarely    Drug use: No           11/15/2023   LAST FHS-7 RESULTS   1st degree relative breast or ovarian cancer No   Any relative bilateral breast cancer No   Any male have breast cancer No   Any ONE woman have BOTH breast AND ovarian cancer No   Any woman with breast cancer before 50yrs No   2 or more relatives with breast AND/OR ovarian cancer No   2 or more relatives with breast AND/OR bowel cancer No           Mammogram Screening - After age 74- determine frequency with patient based on health status, life expectancy and patient goals    ASCVD Risk   The 10-year ASCVD risk score (Aren PAUL, et al., 2019) is: 16.5%    Values used to calculate the score:      Age: 79 years      Sex: Female      Is Non- : No       Diabetic: No      Tobacco smoker: No      Systolic Blood Pressure: 114 mmHg      Is BP treated: No      HDL Cholesterol: 74 mg/dL      Total Cholesterol: 170 mg/dL              Reviewed and updated as needed this visit by Provider     Meds                Current Outpatient Medications   Medication Sig Dispense Refill    alendronate (FOSAMAX) 70 MG tablet Take 1 tablet (70 mg) by mouth every 7 days. 12 tablet 3    atorvastatin (LIPITOR) 20 MG tablet Take 1 tablet (20 mg) by mouth daily. 90 tablet 3    CALCIUM + D 600-200 MG-UNIT OR TABS 2 TABLET DAILY      Cholecalciferol (VITAMIN D) 2000 UNITS tablet Take 2,000 Units by mouth daily 100 tablet 3    cycloSPORINE (RESTASIS) 0.05 % ophthalmic emulsion Place 1 drop into both eyes 2 times daily      gabapentin (NEURONTIN) 300 MG capsule Take 1 capsule (300 mg) by mouth 2 times daily. 180 capsule 3    GLUCOSAMINE CHONDRO COMPLEX 500-400 MG OR TABS 3 tablets by mouth daily      MELATONIN PO Take 5 mg by mouth At Bedtime       minoxidil (ROGAINE) 5 % external solution Apply topically 2 times daily 2%      MULTIVITAMIN OR Take 1 tablet by mouth daily      naproxen sodium 220 MG capsule Take 220 mg by mouth 2 times daily (with meals).      Probiotic Product (PROBIOTIC ACIDOPHILUS) CAPS Take 1 capsule by mouth daily       sertraline (ZOLOFT) 25 MG tablet Take 1 tablet (25 mg) by mouth daily. 90 tablet 3     Current providers sharing in care for this patient include:  Patient Care Team:  Jenny Guzman DO as PCP - General (Internal Medicine)  Jenny Guzman DO as Assigned PCP  Terrell Wolfe AuD as Audiologist (Audiology)    The following health maintenance items are reviewed in Epic and correct as of today:  Health Maintenance   Topic Date Due    LIPID  11/15/2024    MEDICARE ANNUAL WELLNESS VISIT  11/12/2025    ANNUAL REVIEW OF HM ORDERS  11/12/2025    FALL RISK ASSESSMENT  11/12/2025    GLUCOSE  11/15/2026    COLORECTAL CANCER SCREENING  12/15/2026     "ADVANCE CARE PLANNING  11/12/2029    DTAP/TDAP/TD IMMUNIZATION (3 - Td or Tdap) 10/08/2031    DEXA  07/23/2039    HEPATITIS C SCREENING  Completed    PHQ-2 (once per calendar year)  Completed    INFLUENZA VACCINE  Completed    Pneumococcal Vaccine: 65+ Years  Completed    ZOSTER IMMUNIZATION  Completed    RSV VACCINE  Completed    COVID-19 Vaccine  Completed    HPV IMMUNIZATION  Aged Out    MENINGITIS IMMUNIZATION  Aged Out    RSV MONOCLONAL ANTIBODY  Aged Out    MAMMO SCREENING  Discontinued         Review of Systems  Constitutional, neuro, ENT, endocrine, pulmonary, cardiac, gastrointestinal, genitourinary, musculoskeletal, integument and psychiatric systems are negative, except as otherwise noted.     Objective    Exam  /80 (BP Location: Right arm, Patient Position: Sitting, Cuff Size: Adult Regular)   Pulse 78   Temp 97.2  F (36.2  C) (Tympanic)   Resp 14   Ht 1.702 m (5' 7\")   Wt 67.1 kg (148 lb)   SpO2 98%   BMI 23.18 kg/m     Estimated body mass index is 23.18 kg/m  as calculated from the following:    Height as of this encounter: 1.702 m (5' 7\").    Weight as of this encounter: 67.1 kg (148 lb).    Physical Exam  GENERAL: alert and no distress  EYES: Eyes grossly normal to inspection, PERRL and conjunctivae and sclerae normal  HENT: ear canals and TM's normal, nose and mouth without ulcers or lesions  NECK: no adenopathy, no asymmetry, masses, or scars  RESP: lungs clear to auscultation - no rales, rhonchi or wheezes  CV: regular rate and rhythm, normal S1 S2, no S3 or S4, no murmur, click or rub, no peripheral edema  ABDOMEN: soft, nontender, no hepatosplenomegaly, no masses and bowel sounds normal  MS: no gross musculoskeletal defects noted, no edema  SKIN: no suspicious lesions or rashes  NEURO: Normal strength and tone, mentation intact and speech normal  PSYCH: mentation appears normal, affect normal/bright         11/12/2024   Mini Cog   Clock Draw Score 2 Normal   3 Item Recall 3 objects " recalled   Mini Cog Total Score 5                 Signed Electronically by: Jenny Guzman DO

## 2024-12-04 ENCOUNTER — HOSPITAL ENCOUNTER (OUTPATIENT)
Dept: MAMMOGRAPHY | Facility: CLINIC | Age: 79
Discharge: HOME | End: 2024-12-04
Attending: INTERNAL MEDICINE
Payer: COMMERCIAL

## 2024-12-04 DIAGNOSIS — Z12.31 VISIT FOR SCREENING MAMMOGRAM: ICD-10-CM

## 2024-12-04 PROCEDURE — 77063 BREAST TOMOSYNTHESIS BI: CPT

## 2024-12-05 ENCOUNTER — TRANSFERRED RECORDS (OUTPATIENT)
Dept: HEALTH INFORMATION MANAGEMENT | Facility: CLINIC | Age: 79
End: 2024-12-05
Payer: COMMERCIAL

## 2024-12-31 ENCOUNTER — TELEPHONE (OUTPATIENT)
Dept: FAMILY MEDICINE | Facility: CLINIC | Age: 79
End: 2024-12-31
Payer: COMMERCIAL

## 2024-12-31 DIAGNOSIS — U07.1 INFECTION DUE TO 2019 NOVEL CORONAVIRUS: Primary | ICD-10-CM

## 2024-12-31 NOTE — TELEPHONE ENCOUNTER
RN COVID TREATMENT VISIT  12/31/24      The patient has been triaged and does not require a higher level of care.    Patty Zambrano  79 year old  Current weight? 148    Has the patient been seen by a primary care or specialty provider at a [facility] within the past three years? Yes.   Have you been in close proximity to/do you have a known exposure to a person with a confirmed case of influenza? No.     General treatment eligibility:  Date of positive COVID test (PCR or at home)?  12/31/24    Are you or have you been hospitalized for this COVID-19 infection? No.   Have you received monoclonal antibodies or antiviral treatment for COVID-19 since this positive test? No.   Do you have any of the following conditions that place you at risk of being very sick from COVID-19?   - Age 50 or older  - Mood disorders, including depression and schizophrenia spectrum disorders   Yes, patient has at least one high risk condition as noted above.     Current COVID symptoms:   - fever or chills  - cough  - fatigue  - sore throat  - congestion or runny nose  Yes. Patient has at least one symptom as selected.     How many days since symptoms started? 5 days or less. Established patient, 12 years or older weighing at least 88.2 lbs, who has symptoms that started in the past 5 days, has not been hospitalized nor received treatment already, and is at risk for being very sick from COVID-19.     Treatment eligibility by RN:  Are you currently pregnant or nursing? No  Do you have a clinically significant hypersensitivity to nirmatrelvir or ritonavir, or toxic epidermal necrolysis (TEN) or Carranza-Willy Syndrome? No  Do you have a history of hepatitis, any hepatic impairment on the Problem List (such as Child-Aquino Class C, cirrhosis, fatty liver disease, alcoholic liver disease), or was the last liver lab (hepatic panel, ALT, AST, ALK Phos, bilirubin) elevated in the past 6 months? No  Do you have any history of severe  renal impairment (eGFR < 30mL/min)? No    Is patient eligible to continue? Yes, patient meets all eligibility requirements for the RN COVID treatment (as denoted by all no responses above).     Current Outpatient Medications   Medication Sig Dispense Refill    alendronate (FOSAMAX) 70 MG tablet Take 1 tablet (70 mg) by mouth every 7 days. 12 tablet 3    atorvastatin (LIPITOR) 20 MG tablet Take 1 tablet (20 mg) by mouth daily. 90 tablet 3    CALCIUM + D 600-200 MG-UNIT OR TABS 2 TABLET DAILY      Cholecalciferol (VITAMIN D) 2000 UNITS tablet Take 2,000 Units by mouth daily 100 tablet 3    cycloSPORINE (RESTASIS) 0.05 % ophthalmic emulsion Place 1 drop into both eyes 2 times daily      gabapentin (NEURONTIN) 300 MG capsule Take 1 capsule (300 mg) by mouth 2 times daily. 180 capsule 3    GLUCOSAMINE CHONDRO COMPLEX 500-400 MG OR TABS 3 tablets by mouth daily      MELATONIN PO Take 5 mg by mouth At Bedtime       minoxidil (ROGAINE) 5 % external solution Apply topically 2 times daily 2%      MULTIVITAMIN OR Take 1 tablet by mouth daily      naproxen sodium 220 MG capsule Take 220 mg by mouth 2 times daily (with meals).      Probiotic Product (PROBIOTIC ACIDOPHILUS) CAPS Take 1 capsule by mouth daily       sertraline (ZOLOFT) 25 MG tablet Take 1 tablet (25 mg) by mouth daily. 90 tablet 3       Medications from List 1 of the standing order (on medications that exclude the use of Paxlovid) that patient is taking: NONE.   Is patient taking any meds from List 1? No.   Medications from List 2 of the standing order (on meds that provider needs to adjust) that patient is taking: NONE. Is patient on any of the meds from List 2? No.   Medications from List 3 of standing order (on meds that a RN needs to adjust) that patient is taking: atorvastatin (Lipitor): Instructed patient to stop atorvastatin while taking Paxlovid and restart atorvastatin 3 days after the completion of Paxlovid.  Is patient on any meds from List 3? No.      Paxlovid has an approximate 90% reduction in hospitalization. Paxlovid can possibly cause altered sense of taste, diarrhea (loose, watery stools), high blood pressure, muscle aches.     Would patient like a Paxlovid prescription?   Yes.   Lab Results   Component Value Date    GFRESTIMATED 66 11/12/2024       Was last eGFR reduced? No, eGFR 60 or greater/ No Result on record. Patient can receive the normal renal function dose. Paxlovid Rx sent to [place] pharmacy   [facility]   [phone] [address] [place] [address]    Hours:  Mon-Fri: 8:00a - 9:00p  Sat-Sun: 9:00a - 5:00p    Temporary change to home medications: Hold Lipitor    All medication adjustments (holds, etc) were discussed with the patient and patient was asked to repeat back (teachback) their med adjustment.  Did patient understand med adjustment? Yes, patient repeated back and understood correctly.        Reviewed the following instructions with the patient:    Paxlovid (nimatrelvir and ritonavir)    How it works  Two medicines (nirmatrelvir and ritonavir) are taken together. They stop the virus from growing. Less amount of virus is easier for your body to fight.    How to take  Medicine comes in a daily container with both medicine tablets. Take by mouth twice daily (once in the morning, once at night) for 5 days.  The number of tablets to take varies by patient.  Don't chew or break capsules. Swallow whole.    When to take  Take as soon as possible after positive COVID-19 test result, and within 5 days of your first symptoms.    Possible side effects  Can cause altered sense of taste, diarrhea (loose, watery stools), high blood pressure, muscle aches.    Fozia Ochoa RN

## 2025-01-15 ENCOUNTER — OFFICE VISIT (OUTPATIENT)
Dept: FAMILY MEDICINE | Facility: CLINIC | Age: 80
End: 2025-01-15
Payer: COMMERCIAL

## 2025-01-15 VITALS
RESPIRATION RATE: 14 BRPM | SYSTOLIC BLOOD PRESSURE: 116 MMHG | WEIGHT: 146.3 LBS | BODY MASS INDEX: 23.51 KG/M2 | OXYGEN SATURATION: 99 % | TEMPERATURE: 97.4 F | HEART RATE: 92 BPM | HEIGHT: 66 IN | DIASTOLIC BLOOD PRESSURE: 82 MMHG

## 2025-01-15 DIAGNOSIS — B02.21 RAMSAY HUNT AURICULAR SYNDROME: ICD-10-CM

## 2025-01-15 DIAGNOSIS — E78.5 HYPERLIPIDEMIA LDL GOAL <100: ICD-10-CM

## 2025-01-15 DIAGNOSIS — M17.12 PRIMARY OSTEOARTHRITIS OF LEFT KNEE: ICD-10-CM

## 2025-01-15 DIAGNOSIS — M48.061 SPINAL STENOSIS OF LUMBAR REGION, UNSPECIFIED WHETHER NEUROGENIC CLAUDICATION PRESENT: ICD-10-CM

## 2025-01-15 DIAGNOSIS — Z01.818 PREOP GENERAL PHYSICAL EXAM: Primary | ICD-10-CM

## 2025-01-15 PROCEDURE — 99214 OFFICE O/P EST MOD 30 MIN: CPT | Performed by: INTERNAL MEDICINE

## 2025-01-15 PROCEDURE — G2211 COMPLEX E/M VISIT ADD ON: HCPCS | Performed by: INTERNAL MEDICINE

## 2025-01-15 ASSESSMENT — PAIN SCALES - GENERAL: PAINLEVEL_OUTOF10: NO PAIN (0)

## 2025-01-15 NOTE — PATIENT INSTRUCTIONS
How to Take Your Medication Before Surgery  Preoperative Medication Instructions   Stop your vitamins and supplements 1 week prior to the surgery  Okay to continue your prescription medicines as normal up through surgery  Stop naproxen 4 days prior to surgery. No other NSAIDs (ibuprofen/Advil, aspirin).  It IS OK to use Acetaminophen/ Tylenol if needed.    Try topicals such as Voltaren, Aspercream with Lidocaine, Capsaicin, Icy Hot, Biofreeze, Salon Pas   No blood work or EKG needed       Patient Education   Preparing for Your Surgery  For Adults  Getting started  In most cases, a nurse will call to review your health history and instructions. They will give you an arrival time based on your scheduled surgery time. Please be ready to share:  Your doctor's clinic name and phone number  Your medical, surgical, and anesthesia history  A list of allergies and sensitivities  A list of medicines, including herbal treatments and over-the-counter drugs  Whether the patient has a legal guardian (ask how to send us the papers in advance)  Note: You may not receive a call if you were seen at our PAC (Preoperative Assessment Center).  Please tell us if you're pregnant--or if there's any chance you might be pregnant. Some surgeries may injure a fetus (unborn baby), so they require a pregnancy test. Surgeries that are safe for a fetus don't always need a test, and you can choose whether to have one.   Preparing for surgery  Within 10 to 30 days of surgery: Have a pre-op exam (sometimes called an H&P, or History and Physical). This can be done at a clinic or pre-operative center.  If you're having a , you may not need this exam. Talk to your care team.  At your pre-op exam, talk to your care team about all medicines you take. (This includes CBD oil and any drugs, such as THC, marijuana, and other forms of cannabis.) If you need to stop any medicine before surgery, ask when to start taking it again.  This is for your  safety. Many medicines and drugs can make you bleed too much during surgery. Some change how well surgery (anesthesia) drugs work.  Call your insurance company to let them know you're having surgery. (If you don't have insurance, call [phone].)  Call your clinic if there's any change in your health. This includes a scrape or scratch near the surgery site, or any signs of a cold (sore throat, runny nose, cough, rash, fever).  Eating and drinking guidelines  For your safety: Unless your surgeon tells you otherwise, follow the guidelines below.  Eat and drink as normal until 8 hours before you arrive for surgery. After that, no food or milk. You can spit out gum when you arrive.  Drink clear liquids until 2 hours before you arrive. These are liquids you can see through, like water, Gatorade, and Propel Water. They also include plain black coffee and tea (no cream or milk).  No alcohol for 24 hours before you arrive. The night before surgery, stop any drinks that contain THC.  If your care team tells you to take medicine on the morning of surgery, it's okay to take it with a sip of water. No other medicines or drugs are allowed (including CBD oil)--follow your care team's instructions.  If you have questions the day of surgery, call your hospital or surgery center.   Preventing infection  Shower or bathe the night before and the morning of surgery. Follow the instructions your clinic gave you. (If no instructions, use regular soap.)  Don't shave or clip hair near your surgery site. We'll remove the hair if needed.  Don't smoke or vape the morning of surgery. No chewing tobacco for 6 hours before you arrive. A nicotine patch is okay. You may spit out nicotine gum when you arrive.  For some surgeries, the surgeon will tell you to fully quit smoking and nicotine.  We will make every effort to keep you safe from infection. We will:  Clean our hands often with soap and water (or an alcohol-based hand rub).  Clean the  skin at your surgery site with a special soap that kills germs.  Give you a special gown to keep you warm. (Cold raises the risk of infection.)  Wear hair covers, masks, gowns, and gloves during surgery.  Give antibiotic medicine, if prescribed. Not all surgeries need this medicine.  What to bring on the day of surgery  Photo ID and insurance card  Copy of your health care directive, if you have one  Glasses and hearing aids (bring cases)  You can't wear contacts during surgery  Inhaler and eye drops, if you use them (tell us about these when you arrive)  CPAP machine or breathing device, if you use them  A few personal items, if spending the night  If you have . . .  A pacemaker, ICD (cardiac defibrillator), or other implant: Bring the ID card.  An implanted stimulator: Bring the remote control.  A legal guardian: Bring a copy of the certified (court-stamped) guardianship papers.  Please remove any jewelry, including body piercings. Leave jewelry and other valuables at home.  If you're going home the day of surgery  You must have a responsible adult drive you home. They should stay with you overnight as well.  If you don't have someone to stay with you, and you aren't safe to go home alone, we may keep you overnight. Insurance often won't pay for this.  After surgery  If it's hard to control your pain or you need more pain medicine, please call your surgeon's office.  Questions?   If you have any questions for your care team, list them here:   ____________________________________________________________________________________________________________________________________________________________________________________________________________________________________________________________  For informational purposes only. Not to replace the advice of your health care provider. Copyright   2003, 2019 [facility]. All rights reserved. Clinically reviewed by Logan Jimenez MD. [employer] [id number] -  REV 08/24.

## 2025-01-15 NOTE — PROGRESS NOTES
Preoperative Evaluation  [facility]  [address] [address]  Phone: [phone]  Primary Provider: Jenny Guzman DO  Pre-op Performing Provider: Jenny Guzman DO  Thiago 15, 2025             1/9/2025   Surgical Information   What procedure is being done? Left total knee arthroplasty   Facility or Hospital where procedure/surgery will be performed: [facility]   Who is doing the procedure / surgery? Howard amaya MD   Date of surgery / procedure: 01/29/2025   Time of surgery / procedure: 1:15 PM    Where do you plan to recover after surgery? at home with family     Fax number for surgical facility: Note does not need to be faxed, will be available electronically in Epic.    Assessment & Plan     The proposed surgical procedure is considered INTERMEDIATE risk.    Problem List Items Addressed This Visit       Hyperlipidemia LDL goal <100    Primary osteoarthritis of left knee    [place] Paula auricular syndrome    Spinal stenosis of lumbar region     Other Visit Diagnoses       Preop general physical exam    -  Primary                    - No identified additional risk factors other than previously addressed    Preoperative Medication Instructions  Stop your vitamins and supplements 1 week prior to the surgery  Okay to continue your prescription medicines as normal up through surgery  Stop naproxen 4 days prior to surgery. No other NSAIDs (ibuprofen/Advil, aspirin).  It IS OK to use Acetaminophen/ Tylenol if needed.    Try topicals such as Voltaren, Aspercream with Lidocaine, Capsaicin, Icy Hot, Biofreeze, Salon Pas   No blood work or EKG needed    Recommendation  Approval given to proceed with proposed procedure, without further diagnostic evaluation.    Veronica Brambila is a 79 year old, presenting for the following:  Pre-Op Exam          1/15/2025     1:52 PM   Additional Questions   Roomed by иван   Accompanied by self         1/15/2025      1:52 PM   Patient Reported Additional Medications   Patient reports taking the following new medications none     HPI related to upcoming procedure: end stage knee OA failing conservative management        1/9/2025   Pre-Op Questionnaire   Have you ever had a heart attack or stroke? No   Have you ever had surgery on your heart or blood vessels, such as a stent placement, a coronary artery bypass, or surgery on an artery in your head, neck, heart, or legs? No   Do you have chest pain with activity? No   Do you have a history of heart failure? No   Do you currently have a cold, bronchitis or symptoms of other infection? no   Do you have a cough, shortness of breath, or wheezing? No   Do you or anyone in your family have previous history of blood clots? No   Do you or does anyone in your family have a serious bleeding problem such as prolonged bleeding following surgeries or cuts? No   Have you ever had problems with anemia or been told to take iron pills? No   Have you had any abnormal blood loss such as black, tarry or bloody stools, or abnormal vaginal bleeding? No   Have you ever had a blood transfusion? No   Are you willing to have a blood transfusion if it is medically needed before, during, or after your surgery? Yes   Have you or any of your relatives ever had problems with anesthesia? No   Do you have sleep apnea, excessive snoring or daytime drowsiness? No   Do you have any artifical heart valves or other implanted medical devices like a pacemaker, defibrillator, or continuous glucose monitor? No   Do you have artificial joints? No   Are you allergic to latex? No     Health Care Directive  Patient has a Health Care Directive on file      Preoperative Review of    reviewed - no record of controlled substances prescribed.      Status of Chronic Conditions:  See problem list for active medical problems.  Problems all longstanding and stable, except as noted/documented.  See ROS for pertinent symptoms  related to these conditions.    History of COVID - had COVID 12/28, got Paxlovid.  Feeling back to normal now.  Did have rebound COVID with paxlovid    Patient Active Problem List    Diagnosis Date Noted    Osteoarthritis of left knee, unspecified osteoarthritis type 12/13/2024     Priority: Medium    Seasonal affective disorder 10/08/2021     Priority: Medium    Spinal stenosis of lumbar region 10/08/2021     Priority: Medium     Follows with [facility].  Did physical therapy 7/2021; gabapentin helpful.  3 JUS somewhat helpful.  Surgery offered, patient deferring    MRI 10/2020  1. Left foraminal disc herniation at L5-S1 contributing to severe left  foraminal stenosis at L5-S1.  2. Severe bilateral facet arthropathy at L4-L5 with grade 1  anterolisthesis of L4 on L5.      [place] Paula auricular syndrome 10/08/2021     Priority: Medium     Left hearing loss from shingles.  Declined hearing aide.  Chronic tinnitus       Sigmoid diverticulitis 08/26/2016     Priority: Medium     Episode in 2006 or so, then 2015 and 2016, 12/2017.      Cataract 12/29/2015     Priority: Medium    Hyperlipidemia LDL goal <100 10/31/2010     Priority: Medium     CT calcium score 11/22 50; statin started      Hypovitaminosis D 04/07/2010     Priority: Medium    Osteopenia of both hips 03/19/2009     Priority: Medium     Recommend follow-up 10/2023      Sinusitis, chronic 02/18/2008     Priority: Medium     MRI 2003 positive for sinus disease.  Problem list name updated by automated process. Provider to review      Postmenopausal atrophic vaginitis 01/15/2007     Priority: Medium      Past Medical History:   Diagnosis Date    Abnormal Papanicolaou smear of cervix and cervical HPV 1978    cryotherapy    Arthritis 2005    Brachial neuritis or radiculitis NOS     thoracic outlet syndrome, left sided    Depressive disorder     Diverticulitis of colon (without mention of hemorrhage)(562.11) 05/2004    presumptive    Postmenopausal atrophic  vaginitis     Raynaud's syndrome      Past Surgical History:   Procedure Laterality Date    BACK SURGERY  2020, 2021    Epidural steroid injections X 3    COLONOSCOPY  04/03/2014    Procedure: COLONOSCOPY;  Colonoscopy;  Surgeon: Richie Kwon MD;  Location: [facility]    COLONOSCOPY N/A 12/15/2016    Procedure: COLONOSCOPY;  Surgeon: Richie Kwon MD;  Location: [facility]    EYE SURGERY  03/2017    laser    HC DILATION/CURETTAGE DIAG/THER NON OB      ORTHOPEDIC SURGERY  2005    Right knee arthroscopy    SURGICAL HISTORY OF -   05/2005    right knee arthroscopy    VASCULAR SURGERY  1990    Sclerotherapy bilateral leg veins    [facility] COLONOSCOPY THRU STOMA, DIAGNOSTIC  11/2003    due 10 years     Current Outpatient Medications   Medication Sig Dispense Refill    alendronate (FOSAMAX) 70 MG tablet Take 1 tablet (70 mg) by mouth every 7 days. 12 tablet 3    atorvastatin (LIPITOR) 20 MG tablet Take 1 tablet (20 mg) by mouth daily. 90 tablet 3    CALCIUM + D 600-200 MG-UNIT OR TABS 2 TABLET DAILY      Cholecalciferol (VITAMIN D) 2000 UNITS tablet Take 2,000 Units by mouth daily 100 tablet 3    cycloSPORINE (RESTASIS) 0.05 % ophthalmic emulsion Place 1 drop into both eyes 2 times daily      gabapentin (NEURONTIN) 300 MG capsule Take 1 capsule (300 mg) by mouth 2 times daily. 180 capsule 3    GLUCOSAMINE CHONDRO COMPLEX 500-400 MG OR TABS 3 tablets by mouth daily      MELATONIN PO Take 5 mg by mouth At Bedtime       minoxidil (ROGAINE) 5 % external solution Apply topically 2 times daily 2%      MULTIVITAMIN OR Take 1 tablet by mouth daily      naproxen sodium 220 MG capsule Take 220 mg by mouth 2 times daily (with meals).      Probiotic Product (PROBIOTIC ACIDOPHILUS) CAPS Take 1 capsule by mouth daily       sertraline (ZOLOFT) 25 MG tablet Take 1 tablet (25 mg) by mouth daily. 90 tablet 3       Allergies   Allergen Reactions    Amoxicillin-Pot Clavulanate Diarrhea        Social History     Tobacco Use    Smoking status: Never     "Smokeless tobacco: Never   Substance Use Topics    Alcohol use: Yes     Comment: Rarely     Family History   Problem Relation Age of Onset    Hypertension Mother     Osteoporosis Mother     Arthritis Father     Alzheimer Disease Father     Cancer Maternal Grandmother         abdominal CA    Other Cancer Maternal Grandmother     Heart Disease Maternal Grandfather     Diabetes Maternal Grandfather     Alcohol/Drug Paternal Grandfather     Arrhythmia Brother     Other Cancer Brother         Anaplastic sarcoma     History   Drug Use No             Review of Systems  Constitutional, neuro, ENT, endocrine, pulmonary, cardiac, gastrointestinal, genitourinary, musculoskeletal, integument and psychiatric systems are negative, except as otherwise noted.    Objective    /82 (BP Location: Left arm, Patient Position: Sitting, Cuff Size: Adult Regular)   Pulse 92   Temp 97.4  F (36.3  C) (Tympanic)   Resp 14   Ht 1.664 m (5' 5.5\")   Wt 66.4 kg (146 lb 4.8 oz)   SpO2 99%   BMI 23.98 kg/m     Estimated body mass index is 23.98 kg/m  as calculated from the following:    Height as of this encounter: 1.664 m (5' 5.5\").    Weight as of this encounter: 66.4 kg (146 lb 4.8 oz).  Physical Exam  GENERAL: alert and no distress  EYES: Eyes grossly normal to inspection, PERRL and conjunctivae and sclerae normal  HENT: ear canals and TM's normal, nose and mouth without ulcers or lesions  NECK: no adenopathy, no asymmetry, masses, or scars  RESP: lungs clear to auscultation - no rales, rhonchi or wheezes  CV: regular rate and rhythm, normal S1 S2, no S3 or S4, no murmur, click or rub, no peripheral edema  ABDOMEN: soft, nontender, no hepatosplenomegaly, no masses and bowel sounds normal  MS: no gross musculoskeletal defects noted, no edema  SKIN: no suspicious lesions or rashes  NEURO: Normal strength and tone, mentation intact and speech normal  PSYCH: mentation appears normal, affect normal/bright    Recent Labs   Lab Test " 11/12/24  0924      POTASSIUM 4.5   CR 0.89        Diagnostics  No labs were ordered during this visit.   No EKG required, no history of coronary heart disease, significant arrhythmia, peripheral arterial disease or other structural heart disease.    Revised Cardiac Risk Index (RCRI)  The patient has the following serious cardiovascular risks for perioperative complications:   - No serious cardiac risks = 0 points     RCRI Interpretation: 0 points: Class I (very low risk - 0.4% complication rate)         Signed Electronically by: Jenny Guzman DO  A copy of this evaluation report is provided to the requesting physician.

## 2025-01-22 NOTE — PROVIDER NOTIFICATION
01/22/25 0916   Discharge Planning   Concerns to be Addressed all concerns addressed in this encounter   Living Arrangements   People in Home spouse   Type of Residence Private Residence   Is your private residence a single family home or apartment? Single family home   Number of Stairs, Within Home, Primary two   Stair Railings, Within Home, Primary none   Once home, are you able to live on one level? Yes   Bathroom Shower/Tub Tub/Shower unit   Equipment Currently Used at Home shower chair;raised toilet seat   Support System   Support Systems Spouse/Significant Other   Do you have someone available to stay with you one or two nights once you are home? Yes   Medical Clearance   It is recommended that you call and check with any specialty providers before surgery to see if you need surgical clearance.  Do you see any specialty providers outside of your primary care provider? No   Blood   Known Bleeding Disorder or Coagulopathy No   Does the patient have any Anglican/cultural preferences related to blood products? No   Education   Has the patient scheduled or completed pre-op total joint education, either in class or online, in the last 12 months? Yes   Patient attended total joint pre-op class/received pre-op teaching  online

## 2025-01-27 ENCOUNTER — ANESTHESIA EVENT (OUTPATIENT)
Dept: SURGERY | Facility: CLINIC | Age: 80
End: 2025-01-27
Payer: COMMERCIAL

## 2025-01-27 ASSESSMENT — LIFESTYLE VARIABLES: TOBACCO_USE: 0

## 2025-01-27 NOTE — ANESTHESIA PREPROCEDURE EVALUATION
Anesthesia Pre-Procedure Evaluation    Patient: Patty Zambrano   MRN: [id number] : 1945        Procedure : Procedure(s):  Left Total Knee Arthroplasty          Past Medical History:   Diagnosis Date    Abnormal Papanicolaou smear of cervix and cervical HPV     cryotherapy    Arthritis     Brachial neuritis or radiculitis NOS     thoracic outlet syndrome, left sided    Depressive disorder     Diverticulitis of colon (without mention of hemorrhage)(562.11) 2004    presumptive    Postmenopausal atrophic vaginitis     Raynaud's syndrome       Past Surgical History:   Procedure Laterality Date    BACK SURGERY  ,     Epidural steroid injections X 3    COLONOSCOPY  2014    Procedure: COLONOSCOPY;  Colonoscopy;  Surgeon: Richie Kwon MD;  Location: [facility]    COLONOSCOPY N/A 12/15/2016    Procedure: COLONOSCOPY;  Surgeon: Richie Kwon MD;  Location: [facility]    EYE SURGERY  2017    laser    HC DILATION/CURETTAGE DIAG/THER NON OB      ORTHOPEDIC SURGERY      Right knee arthroscopy    SURGICAL HISTORY OF -   2005    right knee arthroscopy    VASCULAR SURGERY      Sclerotherapy bilateral leg veins    [facility] COLONOSCOPY THRU STOMA, DIAGNOSTIC  2003    due 10 years      Allergies   Allergen Reactions    Amoxicillin-Pot Clavulanate Diarrhea      Social History     Tobacco Use    Smoking status: Never    Smokeless tobacco: Never   Substance Use Topics    Alcohol use: Yes     Comment: Rarely      Wt Readings from Last 1 Encounters:   01/15/25 66.4 kg (146 lb 4.8 oz)        Anesthesia Evaluation   Pt has had prior anesthetic.         ROS/MED HX  ENT/Pulmonary:    (-) tobacco use   Neurologic: Comment: Brachial neuritis or radiculitis NOS      Cardiovascular:     (+) Dyslipidemia - -   -  - -                                      METS/Exercise Tolerance:     Hematologic:       Musculoskeletal: Comment: Spinal stenosis of lumbar region  (+)  arthritis,             GI/Hepatic:      "  Renal/Genitourinary:       Endo:       Psychiatric/Substance Use:     (+) psychiatric history depression       Infectious Disease:       Malignancy:       Other:            Physical Exam    Airway  airway exam normal           Respiratory Devices and Support         Dental       (+) Minor Abnormalities - some fillings, tiny chips      Cardiovascular   cardiovascular exam normal       Rhythm and rate: regular and normal     Pulmonary   pulmonary exam normal        breath sounds clear to auscultation           OUTSIDE LABS:  CBC:   Lab Results   Component Value Date    WBC 7.6 06/06/2018    WBC 6.1 08/30/2016    HGB 13.4 01/18/2023    HGB 13.3 03/18/2022    HCT 42.2 06/06/2018    HCT 39.6 08/30/2016     06/06/2018     08/30/2016     BMP:   Lab Results   Component Value Date     11/12/2024     11/15/2023    POTASSIUM 4.5 11/12/2024    POTASSIUM 4.1 11/15/2023    CHLORIDE 103 11/12/2024    CHLORIDE 105 11/15/2023    CO2 28 11/12/2024    CO2 26 11/15/2023    BUN 19.0 11/12/2024    BUN 18.7 11/15/2023    CR 0.89 11/12/2024    CR 0.88 11/15/2023    GLC 97 11/12/2024    GLC 89 11/15/2023     COAGS: No results found for: \"PTT\", \"INR\", \"FIBR\"  POC: No results found for: \"BGM\", \"HCG\", \"HCGS\"  HEPATIC:   Lab Results   Component Value Date    ALBUMIN 3.6 08/30/2016    PROTTOTAL 7.2 08/30/2016    ALT 28 08/30/2016    AST 21 08/30/2016    ALKPHOS 61 08/09/2024    BILITOTAL 0.3 08/30/2016     OTHER:   Lab Results   Component Value Date    LACT 0.7 08/30/2016    ARCHIE 9.6 11/12/2024    LIPASE 97 11/20/2014    TSH 1.62 06/06/2018    T4 0.84 06/06/2018       Anesthesia Plan    ASA Status:  2    NPO Status:  NPO Appropriate    Anesthesia Type: Spinal.      Maintenance: TIVA.        Consents    Anesthesia Plan(s) and associated risks, benefits, and realistic alternatives discussed. Questions answered and patient/representative(s) expressed understanding.     - Discussed: Risks, Benefits and Alternatives for " BOTH SEDATION and the PROCEDURE were discussed     - Discussed with:  Patient            Postoperative Care    Pain management: IV analgesics, Multi-modal analgesia.   PONV prophylaxis: Ondansetron (or other 5HT-3), Dexamethasone or Solumedrol     Comments:               BUD Quezada CRNA    I have reviewed the pertinent notes and labs in the chart from the past 30 days and (re)examined the patient.  Any updates or changes from those notes are reflected in this note.    Clinically Significant Risk Factors Present on Admission

## 2025-01-29 ENCOUNTER — HOSPITAL ENCOUNTER (OUTPATIENT)
Facility: CLINIC | Age: 80
Discharge: HOME | End: 2025-01-30
Attending: ORTHOPAEDIC SURGERY | Admitting: ORTHOPAEDIC SURGERY
Payer: COMMERCIAL

## 2025-01-29 ENCOUNTER — APPOINTMENT (OUTPATIENT)
Dept: GENERAL RADIOLOGY | Facility: CLINIC | Age: 80
End: 2025-01-29
Attending: ORTHOPAEDIC SURGERY
Payer: COMMERCIAL

## 2025-01-29 ENCOUNTER — ANESTHESIA (OUTPATIENT)
Dept: SURGERY | Facility: CLINIC | Age: 80
End: 2025-01-29
Payer: COMMERCIAL

## 2025-01-29 DIAGNOSIS — Z96.652 HX OF TOTAL KNEE REPLACEMENT, LEFT: Primary | ICD-10-CM

## 2025-01-29 PROBLEM — M17.12 LEFT KNEE DJD: Status: ACTIVE | Noted: 2025-01-29

## 2025-01-29 LAB
CREAT SERPL-MCNC: 0.8 MG/DL (ref 0.51–0.95)
EGFRCR SERPLBLD CKD-EPI 2021: 75 ML/MIN/1.73M2
ERYTHROCYTE [DISTWIDTH] IN BLOOD BY AUTOMATED COUNT: 12.4 % (ref 10–15)
GLUCOSE BLDC GLUCOMTR-MCNC: 135 MG/DL (ref 70–99)
GLUCOSE BLDC GLUCOMTR-MCNC: 96 MG/DL (ref 70–99)
HCT VFR BLD AUTO: 38.2 % (ref 35–47)
HGB BLD-MCNC: 12.9 G/DL (ref 11.7–15.7)
MCH RBC QN AUTO: 32 PG (ref 26.5–33)
MCHC RBC AUTO-ENTMCNC: 33.8 G/DL (ref 31.5–36.5)
MCV RBC AUTO: 95 FL (ref 78–100)
PLATELET # BLD AUTO: 254 10E3/UL (ref 150–450)
POTASSIUM SERPL-SCNC: 4.1 MMOL/L (ref 3.4–5.3)
RBC # BLD AUTO: 4.03 10E6/UL (ref 3.8–5.2)
WBC # BLD AUTO: 5 10E3/UL (ref 4–11)

## 2025-01-29 PROCEDURE — 82962 GLUCOSE BLOOD TEST: CPT

## 2025-01-29 PROCEDURE — 250N000011 HC RX IP 250 OP 636: Performed by: PHYSICIAN ASSISTANT

## 2025-01-29 PROCEDURE — C1776 JOINT DEVICE (IMPLANTABLE): HCPCS | Performed by: ORTHOPAEDIC SURGERY

## 2025-01-29 PROCEDURE — 73560 X-RAY EXAM OF KNEE 1 OR 2: CPT | Mod: LT

## 2025-01-29 PROCEDURE — 360N000077 HC SURGERY LEVEL 4, PER MIN: Performed by: ORTHOPAEDIC SURGERY

## 2025-01-29 PROCEDURE — 258N000003 HC RX IP 258 OP 636: Performed by: ORTHOPAEDIC SURGERY

## 2025-01-29 PROCEDURE — 250N000013 HC RX MED GY IP 250 OP 250 PS 637: Performed by: ORTHOPAEDIC SURGERY

## 2025-01-29 PROCEDURE — 36415 COLL VENOUS BLD VENIPUNCTURE: CPT | Performed by: PHYSICIAN ASSISTANT

## 2025-01-29 PROCEDURE — 250N000013 HC RX MED GY IP 250 OP 250 PS 637

## 2025-01-29 PROCEDURE — 82565 ASSAY OF CREATININE: CPT | Performed by: PHYSICIAN ASSISTANT

## 2025-01-29 PROCEDURE — 271N000001 HC OR GENERAL SUPPLY NON-STERILE: Performed by: ORTHOPAEDIC SURGERY

## 2025-01-29 PROCEDURE — 85027 COMPLETE CBC AUTOMATED: CPT | Performed by: PHYSICIAN ASSISTANT

## 2025-01-29 PROCEDURE — 250N000009 HC RX 250: Performed by: NURSE ANESTHETIST, CERTIFIED REGISTERED

## 2025-01-29 PROCEDURE — 258N000003 HC RX IP 258 OP 636: Performed by: NURSE ANESTHETIST, CERTIFIED REGISTERED

## 2025-01-29 PROCEDURE — 999N000141 HC STATISTIC PRE-PROCEDURE NURSING ASSESSMENT: Performed by: ORTHOPAEDIC SURGERY

## 2025-01-29 PROCEDURE — 272N000001 HC OR GENERAL SUPPLY STERILE: Performed by: ORTHOPAEDIC SURGERY

## 2025-01-29 PROCEDURE — 370N000017 HC ANESTHESIA TECHNICAL FEE, PER MIN: Performed by: ORTHOPAEDIC SURGERY

## 2025-01-29 PROCEDURE — 250N000009 HC RX 250: Performed by: ORTHOPAEDIC SURGERY

## 2025-01-29 PROCEDURE — 250N000011 HC RX IP 250 OP 636: Performed by: ORTHOPAEDIC SURGERY

## 2025-01-29 PROCEDURE — 710N000009 HC RECOVERY PHASE 1, LEVEL 1, PER MIN: Performed by: ORTHOPAEDIC SURGERY

## 2025-01-29 PROCEDURE — 84132 ASSAY OF SERUM POTASSIUM: CPT | Performed by: PHYSICIAN ASSISTANT

## 2025-01-29 PROCEDURE — C1713 ANCHOR/SCREW BN/BN,TIS/BN: HCPCS | Performed by: ORTHOPAEDIC SURGERY

## 2025-01-29 PROCEDURE — 250N000013 HC RX MED GY IP 250 OP 250 PS 637: Performed by: PHYSICIAN ASSISTANT

## 2025-01-29 PROCEDURE — 250N000013 HC RX MED GY IP 250 OP 250 PS 637: Performed by: NURSE ANESTHETIST, CERTIFIED REGISTERED

## 2025-01-29 PROCEDURE — 258N000001 HC RX 258: Performed by: ORTHOPAEDIC SURGERY

## 2025-01-29 PROCEDURE — 250N000011 HC RX IP 250 OP 636: Mod: JW | Performed by: NURSE ANESTHETIST, CERTIFIED REGISTERED

## 2025-01-29 DEVICE — IMP BONE CEMENT STRK SIMPLEX TOBRAMYCIN 40CC 6197-9-001: Type: IMPLANTABLE DEVICE | Site: KNEE | Status: FUNCTIONAL

## 2025-01-29 DEVICE — TIBIAL BEARING INSERT - CS
Type: IMPLANTABLE DEVICE | Site: KNEE | Status: FUNCTIONAL
Brand: TRIATHLON

## 2025-01-29 DEVICE — PIN FIXATION SS FLUTE SQUARE L3.5 IN OD1/8 IN 7650-2038A: Type: IMPLANTABLE DEVICE | Site: KNEE | Status: FUNCTIONAL

## 2025-01-29 DEVICE — ASYMMETRIC PATELLA
Type: IMPLANTABLE DEVICE | Site: KNEE | Status: FUNCTIONAL
Brand: TRIATHLON

## 2025-01-29 DEVICE — CRUCIATE RETAINING FEMORAL
Type: IMPLANTABLE DEVICE | Site: KNEE | Status: FUNCTIONAL
Brand: TRIATHLON

## 2025-01-29 DEVICE — PRIMARY TIBIAL BASEPLATE
Type: IMPLANTABLE DEVICE | Site: KNEE | Status: FUNCTIONAL
Brand: TRIATHLON

## 2025-01-29 RX ORDER — ASPIRIN 325 MG
325 TABLET, DELAYED RELEASE (ENTERIC COATED) ORAL DAILY
Status: SHIPPED
Start: 2025-01-29

## 2025-01-29 RX ORDER — DIPHENHYDRAMINE HCL 12.5 MG/5ML
12.5 SOLUTION ORAL EVERY 6 HOURS PRN
Status: DISCONTINUED | OUTPATIENT
Start: 2025-01-29 | End: 2025-01-30 | Stop reason: HOSPADM

## 2025-01-29 RX ORDER — ACETAMINOPHEN 325 MG/1
975 TABLET ORAL EVERY 8 HOURS
Status: DISCONTINUED | OUTPATIENT
Start: 2025-01-29 | End: 2025-01-30 | Stop reason: HOSPADM

## 2025-01-29 RX ORDER — ASPIRIN 325 MG
325 TABLET, DELAYED RELEASE (ENTERIC COATED) ORAL DAILY
Status: DISCONTINUED | OUTPATIENT
Start: 2025-01-29 | End: 2025-01-30 | Stop reason: HOSPADM

## 2025-01-29 RX ORDER — AMOXICILLIN 250 MG
1 CAPSULE ORAL 2 TIMES DAILY
Status: DISCONTINUED | OUTPATIENT
Start: 2025-01-29 | End: 2025-01-30 | Stop reason: HOSPADM

## 2025-01-29 RX ORDER — OXYCODONE HYDROCHLORIDE 5 MG/1
5 TABLET ORAL
Status: CANCELLED | OUTPATIENT
Start: 2025-01-29

## 2025-01-29 RX ORDER — OXYCODONE HYDROCHLORIDE 5 MG/1
5 TABLET ORAL EVERY 4 HOURS PRN
Status: DISCONTINUED | OUTPATIENT
Start: 2025-01-29 | End: 2025-01-30 | Stop reason: HOSPADM

## 2025-01-29 RX ORDER — SODIUM CHLORIDE, SODIUM LACTATE, POTASSIUM CHLORIDE, CALCIUM CHLORIDE 600; 310; 30; 20 MG/100ML; MG/100ML; MG/100ML; MG/100ML
INJECTION, SOLUTION INTRAVENOUS CONTINUOUS
Status: DISCONTINUED | OUTPATIENT
Start: 2025-01-29 | End: 2025-01-30 | Stop reason: HOSPADM

## 2025-01-29 RX ORDER — DEXAMETHASONE SODIUM PHOSPHATE 4 MG/ML
4 INJECTION, SOLUTION INTRA-ARTICULAR; INTRALESIONAL; INTRAMUSCULAR; INTRAVENOUS; SOFT TISSUE
Status: CANCELLED | OUTPATIENT
Start: 2025-01-29

## 2025-01-29 RX ORDER — ONDANSETRON 2 MG/ML
4 INJECTION INTRAMUSCULAR; INTRAVENOUS EVERY 30 MIN PRN
Status: DISCONTINUED | OUTPATIENT
Start: 2025-01-29 | End: 2025-01-29 | Stop reason: HOSPADM

## 2025-01-29 RX ORDER — DEXAMETHASONE SODIUM PHOSPHATE 4 MG/ML
INJECTION, SOLUTION INTRA-ARTICULAR; INTRALESIONAL; INTRAMUSCULAR; INTRAVENOUS; SOFT TISSUE PRN
Status: DISCONTINUED | OUTPATIENT
Start: 2025-01-29 | End: 2025-01-29

## 2025-01-29 RX ORDER — OXYCODONE HYDROCHLORIDE 5 MG/1
5-10 TABLET ORAL EVERY 4 HOURS PRN
Qty: 30 TABLET | Refills: 0 | Status: SHIPPED | OUTPATIENT
Start: 2025-01-29

## 2025-01-29 RX ORDER — HYDROMORPHONE HCL IN WATER/PF 6 MG/30 ML
0.2 PATIENT CONTROLLED ANALGESIA SYRINGE INTRAVENOUS EVERY 5 MIN PRN
Status: DISCONTINUED | OUTPATIENT
Start: 2025-01-29 | End: 2025-01-29 | Stop reason: HOSPADM

## 2025-01-29 RX ORDER — FAMOTIDINE 20 MG/1
20 TABLET, FILM COATED ORAL 2 TIMES DAILY
Status: DISCONTINUED | OUTPATIENT
Start: 2025-01-29 | End: 2025-01-30 | Stop reason: HOSPADM

## 2025-01-29 RX ORDER — NALOXONE HYDROCHLORIDE 0.4 MG/ML
0.1 INJECTION, SOLUTION INTRAMUSCULAR; INTRAVENOUS; SUBCUTANEOUS
Status: DISCONTINUED | OUTPATIENT
Start: 2025-01-29 | End: 2025-01-29 | Stop reason: HOSPADM

## 2025-01-29 RX ORDER — ONDANSETRON 2 MG/ML
INJECTION INTRAMUSCULAR; INTRAVENOUS PRN
Status: DISCONTINUED | OUTPATIENT
Start: 2025-01-29 | End: 2025-01-29

## 2025-01-29 RX ORDER — NALOXONE HYDROCHLORIDE 0.4 MG/ML
0.2 INJECTION, SOLUTION INTRAMUSCULAR; INTRAVENOUS; SUBCUTANEOUS
Status: DISCONTINUED | OUTPATIENT
Start: 2025-01-29 | End: 2025-01-30 | Stop reason: HOSPADM

## 2025-01-29 RX ORDER — MEPERIDINE HYDROCHLORIDE 25 MG/ML
12.5 INJECTION INTRAMUSCULAR; INTRAVENOUS; SUBCUTANEOUS EVERY 5 MIN PRN
Status: DISCONTINUED | OUTPATIENT
Start: 2025-01-29 | End: 2025-01-29 | Stop reason: HOSPADM

## 2025-01-29 RX ORDER — CEFAZOLIN SODIUM 1 G/3ML
1 INJECTION, POWDER, FOR SOLUTION INTRAMUSCULAR; INTRAVENOUS EVERY 8 HOURS
Status: COMPLETED | OUTPATIENT
Start: 2025-01-29 | End: 2025-01-30

## 2025-01-29 RX ORDER — ATORVASTATIN CALCIUM 20 MG/1
20 TABLET, FILM COATED ORAL DAILY
Status: DISCONTINUED | OUTPATIENT
Start: 2025-01-29 | End: 2025-01-30 | Stop reason: HOSPADM

## 2025-01-29 RX ORDER — LABETALOL HYDROCHLORIDE 5 MG/ML
10 INJECTION, SOLUTION INTRAVENOUS
Status: DISCONTINUED | OUTPATIENT
Start: 2025-01-29 | End: 2025-01-29 | Stop reason: HOSPADM

## 2025-01-29 RX ORDER — FENTANYL CITRATE 50 UG/ML
50 INJECTION, SOLUTION INTRAMUSCULAR; INTRAVENOUS EVERY 5 MIN PRN
Status: DISCONTINUED | OUTPATIENT
Start: 2025-01-29 | End: 2025-01-29 | Stop reason: HOSPADM

## 2025-01-29 RX ORDER — LIDOCAINE 40 MG/G
CREAM TOPICAL
Status: DISCONTINUED | OUTPATIENT
Start: 2025-01-29 | End: 2025-01-29 | Stop reason: HOSPADM

## 2025-01-29 RX ORDER — NALOXONE HYDROCHLORIDE 0.4 MG/ML
0.1 INJECTION, SOLUTION INTRAMUSCULAR; INTRAVENOUS; SUBCUTANEOUS
Status: CANCELLED | OUTPATIENT
Start: 2025-01-29

## 2025-01-29 RX ORDER — HYDROMORPHONE HCL IN WATER/PF 6 MG/30 ML
0.4 PATIENT CONTROLLED ANALGESIA SYRINGE INTRAVENOUS
Status: DISCONTINUED | OUTPATIENT
Start: 2025-01-29 | End: 2025-01-30 | Stop reason: HOSPADM

## 2025-01-29 RX ORDER — POLYETHYLENE GLYCOL 3350 17 G/17G
17 POWDER, FOR SOLUTION ORAL DAILY
Status: DISCONTINUED | OUTPATIENT
Start: 2025-01-30 | End: 2025-01-30 | Stop reason: HOSPADM

## 2025-01-29 RX ORDER — ONDANSETRON 4 MG/1
4 TABLET, ORALLY DISINTEGRATING ORAL EVERY 30 MIN PRN
Status: DISCONTINUED | OUTPATIENT
Start: 2025-01-29 | End: 2025-01-29 | Stop reason: HOSPADM

## 2025-01-29 RX ORDER — ACETAMINOPHEN 325 MG/1
650 TABLET ORAL EVERY 4 HOURS PRN
Qty: 100 TABLET | Refills: 0 | Status: SHIPPED | OUTPATIENT
Start: 2025-01-29

## 2025-01-29 RX ORDER — ACETAMINOPHEN 325 MG/1
975 TABLET ORAL ONCE
Status: COMPLETED | OUTPATIENT
Start: 2025-01-29 | End: 2025-01-29

## 2025-01-29 RX ORDER — AMOXICILLIN 250 MG
1-2 CAPSULE ORAL 2 TIMES DAILY
Status: SHIPPED
Start: 2025-01-29

## 2025-01-29 RX ORDER — DEXAMETHASONE SODIUM PHOSPHATE 4 MG/ML
4 INJECTION, SOLUTION INTRA-ARTICULAR; INTRALESIONAL; INTRAMUSCULAR; INTRAVENOUS; SOFT TISSUE
Status: DISCONTINUED | OUTPATIENT
Start: 2025-01-29 | End: 2025-01-29 | Stop reason: HOSPADM

## 2025-01-29 RX ORDER — ONDANSETRON 4 MG/1
4 TABLET, ORALLY DISINTEGRATING ORAL EVERY 6 HOURS PRN
Status: DISCONTINUED | OUTPATIENT
Start: 2025-01-29 | End: 2025-01-30 | Stop reason: HOSPADM

## 2025-01-29 RX ORDER — SODIUM CHLORIDE, SODIUM LACTATE, POTASSIUM CHLORIDE, CALCIUM CHLORIDE 600; 310; 30; 20 MG/100ML; MG/100ML; MG/100ML; MG/100ML
INJECTION, SOLUTION INTRAVENOUS CONTINUOUS
Status: DISCONTINUED | OUTPATIENT
Start: 2025-01-29 | End: 2025-01-29 | Stop reason: HOSPADM

## 2025-01-29 RX ORDER — OXYCODONE HYDROCHLORIDE 5 MG/1
10 TABLET ORAL EVERY 4 HOURS PRN
Status: DISCONTINUED | OUTPATIENT
Start: 2025-01-29 | End: 2025-01-30 | Stop reason: HOSPADM

## 2025-01-29 RX ORDER — BISACODYL 10 MG
10 SUPPOSITORY, RECTAL RECTAL DAILY PRN
Status: DISCONTINUED | OUTPATIENT
Start: 2025-01-29 | End: 2025-01-30 | Stop reason: HOSPADM

## 2025-01-29 RX ORDER — BUPIVACAINE HYDROCHLORIDE 7.5 MG/ML
INJECTION, SOLUTION INTRASPINAL
Status: COMPLETED | OUTPATIENT
Start: 2025-01-29 | End: 2025-01-29

## 2025-01-29 RX ORDER — FENTANYL CITRATE 0.05 MG/ML
INJECTION, SOLUTION INTRAMUSCULAR; INTRAVENOUS PRN
Status: DISCONTINUED | OUTPATIENT
Start: 2025-01-29 | End: 2025-01-29

## 2025-01-29 RX ORDER — ONDANSETRON 4 MG/1
4 TABLET, ORALLY DISINTEGRATING ORAL EVERY 30 MIN PRN
Status: CANCELLED | OUTPATIENT
Start: 2025-01-29

## 2025-01-29 RX ORDER — SERTRALINE HYDROCHLORIDE 25 MG/1
25 TABLET, FILM COATED ORAL DAILY
Status: DISCONTINUED | OUTPATIENT
Start: 2025-01-30 | End: 2025-01-30 | Stop reason: HOSPADM

## 2025-01-29 RX ORDER — NALOXONE HYDROCHLORIDE 0.4 MG/ML
0.4 INJECTION, SOLUTION INTRAMUSCULAR; INTRAVENOUS; SUBCUTANEOUS
Status: DISCONTINUED | OUTPATIENT
Start: 2025-01-29 | End: 2025-01-30 | Stop reason: HOSPADM

## 2025-01-29 RX ORDER — MAGNESIUM HYDROXIDE 1200 MG/15ML
LIQUID ORAL PRN
Status: DISCONTINUED | OUTPATIENT
Start: 2025-01-29 | End: 2025-01-29 | Stop reason: HOSPADM

## 2025-01-29 RX ORDER — CEFAZOLIN SODIUM/WATER 2 G/20 ML
2 SYRINGE (ML) INTRAVENOUS
Status: COMPLETED | OUTPATIENT
Start: 2025-01-29 | End: 2025-01-29

## 2025-01-29 RX ORDER — ONDANSETRON 2 MG/ML
4 INJECTION INTRAMUSCULAR; INTRAVENOUS EVERY 6 HOURS PRN
Status: DISCONTINUED | OUTPATIENT
Start: 2025-01-29 | End: 2025-01-30 | Stop reason: HOSPADM

## 2025-01-29 RX ORDER — METHOCARBAMOL 500 MG/1
500 TABLET, FILM COATED ORAL 4 TIMES DAILY PRN
Qty: 60 TABLET | Refills: 1 | Status: SHIPPED | OUTPATIENT
Start: 2025-01-29

## 2025-01-29 RX ORDER — LIDOCAINE 40 MG/G
CREAM TOPICAL
Status: DISCONTINUED | OUTPATIENT
Start: 2025-01-29 | End: 2025-01-30 | Stop reason: HOSPADM

## 2025-01-29 RX ORDER — TRANEXAMIC ACID 650 MG/1
1950 TABLET ORAL ONCE
Status: COMPLETED | OUTPATIENT
Start: 2025-01-29 | End: 2025-01-29

## 2025-01-29 RX ORDER — PROCHLORPERAZINE MALEATE 5 MG/1
5 TABLET ORAL EVERY 6 HOURS PRN
Status: DISCONTINUED | OUTPATIENT
Start: 2025-01-29 | End: 2025-01-30 | Stop reason: HOSPADM

## 2025-01-29 RX ORDER — ACETAMINOPHEN 325 MG/1
975 TABLET ORAL ONCE
Status: DISCONTINUED | OUTPATIENT
Start: 2025-01-29 | End: 2025-01-29

## 2025-01-29 RX ORDER — METHOCARBAMOL 500 MG/1
250 TABLET ORAL EVERY 6 HOURS PRN
Status: DISCONTINUED | OUTPATIENT
Start: 2025-01-29 | End: 2025-01-30 | Stop reason: HOSPADM

## 2025-01-29 RX ORDER — OXYCODONE HYDROCHLORIDE 5 MG/1
10 TABLET ORAL
Status: CANCELLED | OUTPATIENT
Start: 2025-01-29

## 2025-01-29 RX ORDER — HYDROMORPHONE HCL IN WATER/PF 6 MG/30 ML
0.2 PATIENT CONTROLLED ANALGESIA SYRINGE INTRAVENOUS
Status: DISCONTINUED | OUTPATIENT
Start: 2025-01-29 | End: 2025-01-30 | Stop reason: HOSPADM

## 2025-01-29 RX ORDER — FENTANYL CITRATE 50 UG/ML
25 INJECTION, SOLUTION INTRAMUSCULAR; INTRAVENOUS EVERY 5 MIN PRN
Status: DISCONTINUED | OUTPATIENT
Start: 2025-01-29 | End: 2025-01-29 | Stop reason: HOSPADM

## 2025-01-29 RX ORDER — HYDROMORPHONE HCL IN WATER/PF 6 MG/30 ML
0.4 PATIENT CONTROLLED ANALGESIA SYRINGE INTRAVENOUS EVERY 5 MIN PRN
Status: DISCONTINUED | OUTPATIENT
Start: 2025-01-29 | End: 2025-01-29 | Stop reason: HOSPADM

## 2025-01-29 RX ORDER — ONDANSETRON 2 MG/ML
4 INJECTION INTRAMUSCULAR; INTRAVENOUS EVERY 30 MIN PRN
Status: CANCELLED | OUTPATIENT
Start: 2025-01-29

## 2025-01-29 RX ORDER — LIDOCAINE HYDROCHLORIDE 20 MG/ML
INJECTION, SOLUTION INFILTRATION; PERINEURAL PRN
Status: DISCONTINUED | OUTPATIENT
Start: 2025-01-29 | End: 2025-01-29

## 2025-01-29 RX ORDER — GABAPENTIN 300 MG/1
300 CAPSULE ORAL 2 TIMES DAILY
Status: DISCONTINUED | OUTPATIENT
Start: 2025-01-29 | End: 2025-01-30 | Stop reason: HOSPADM

## 2025-01-29 RX ORDER — PROPOFOL 10 MG/ML
INJECTION, EMULSION INTRAVENOUS CONTINUOUS PRN
Status: DISCONTINUED | OUTPATIENT
Start: 2025-01-29 | End: 2025-01-29

## 2025-01-29 RX ORDER — CEFAZOLIN SODIUM/WATER 2 G/20 ML
2 SYRINGE (ML) INTRAVENOUS SEE ADMIN INSTRUCTIONS
Status: DISCONTINUED | OUTPATIENT
Start: 2025-01-29 | End: 2025-01-29 | Stop reason: HOSPADM

## 2025-01-29 RX ADMIN — PHENYLEPHRINE HYDROCHLORIDE 50 MCG: 10 INJECTION INTRAVENOUS at 14:07

## 2025-01-29 RX ADMIN — MIDAZOLAM 2 MG: 1 INJECTION INTRAMUSCULAR; INTRAVENOUS at 13:09

## 2025-01-29 RX ADMIN — ONDANSETRON 4 MG: 2 INJECTION INTRAMUSCULAR; INTRAVENOUS at 13:21

## 2025-01-29 RX ADMIN — FENTANYL CITRATE 100 MCG: 0.05 INJECTION, SOLUTION INTRAMUSCULAR; INTRAVENOUS at 13:09

## 2025-01-29 RX ADMIN — HYDROMORPHONE HYDROCHLORIDE 1 MG: 1 INJECTION, SOLUTION INTRAMUSCULAR; INTRAVENOUS; SUBCUTANEOUS at 15:05

## 2025-01-29 RX ADMIN — BUPIVACAINE HYDROCHLORIDE IN DEXTROSE 1.5 ML: 7.5 INJECTION, SOLUTION SUBARACHNOID at 13:19

## 2025-01-29 RX ADMIN — SENNOSIDES AND DOCUSATE SODIUM 1 TABLET: 50; 8.6 TABLET ORAL at 20:20

## 2025-01-29 RX ADMIN — PHENYLEPHRINE HYDROCHLORIDE 100 MCG: 10 INJECTION INTRAVENOUS at 13:58

## 2025-01-29 RX ADMIN — DEXAMETHASONE SODIUM PHOSPHATE 10 MG: 4 INJECTION, SOLUTION INTRA-ARTICULAR; INTRALESIONAL; INTRAMUSCULAR; INTRAVENOUS; SOFT TISSUE at 13:21

## 2025-01-29 RX ADMIN — PHENYLEPHRINE HYDROCHLORIDE 50 MCG: 10 INJECTION INTRAVENOUS at 14:09

## 2025-01-29 RX ADMIN — Medication 2 G: at 13:07

## 2025-01-29 RX ADMIN — CEFAZOLIN 1 G: 330 INJECTION, POWDER, FOR SOLUTION INTRAMUSCULAR; INTRAVENOUS at 20:24

## 2025-01-29 RX ADMIN — ASPIRIN 325 MG: 325 TABLET, COATED ORAL at 18:54

## 2025-01-29 RX ADMIN — LIDOCAINE HYDROCHLORIDE 60 MG: 20 INJECTION, SOLUTION INFILTRATION; PERINEURAL at 13:21

## 2025-01-29 RX ADMIN — TRANEXAMIC ACID 1950 MG: 650 TABLET ORAL at 12:44

## 2025-01-29 RX ADMIN — ACETAMINOPHEN 975 MG: 325 TABLET, FILM COATED ORAL at 18:54

## 2025-01-29 RX ADMIN — FAMOTIDINE 20 MG: 20 TABLET, FILM COATED ORAL at 20:20

## 2025-01-29 RX ADMIN — PHENYLEPHRINE HYDROCHLORIDE 50 MCG: 10 INJECTION INTRAVENOUS at 14:12

## 2025-01-29 RX ADMIN — PHENYLEPHRINE HYDROCHLORIDE 50 MCG: 10 INJECTION INTRAVENOUS at 14:15

## 2025-01-29 RX ADMIN — SODIUM CHLORIDE, POTASSIUM CHLORIDE, SODIUM LACTATE AND CALCIUM CHLORIDE: 600; 310; 30; 20 INJECTION, SOLUTION INTRAVENOUS at 13:07

## 2025-01-29 RX ADMIN — METHOCARBAMOL 250 MG: 500 TABLET ORAL at 19:37

## 2025-01-29 RX ADMIN — GABAPENTIN 300 MG: 300 CAPSULE ORAL at 20:20

## 2025-01-29 RX ADMIN — PROPOFOL 50 MCG/KG/MIN: 10 INJECTION, EMULSION INTRAVENOUS at 13:21

## 2025-01-29 RX ADMIN — PHENYLEPHRINE HYDROCHLORIDE 100 MCG: 10 INJECTION INTRAVENOUS at 13:42

## 2025-01-29 RX ADMIN — ACETAMINOPHEN 975 MG: 325 TABLET, FILM COATED ORAL at 12:44

## 2025-01-29 RX ADMIN — SODIUM CHLORIDE, POTASSIUM CHLORIDE, SODIUM LACTATE AND CALCIUM CHLORIDE: 600; 310; 30; 20 INJECTION, SOLUTION INTRAVENOUS at 18:56

## 2025-01-29 RX ADMIN — ATORVASTATIN CALCIUM 20 MG: 20 TABLET, FILM COATED ORAL at 20:20

## 2025-01-29 RX ADMIN — SODIUM CHLORIDE, POTASSIUM CHLORIDE, SODIUM LACTATE AND CALCIUM CHLORIDE: 600; 310; 30; 20 INJECTION, SOLUTION INTRAVENOUS at 12:45

## 2025-01-29 RX ADMIN — PHENYLEPHRINE HYDROCHLORIDE 100 MCG: 10 INJECTION INTRAVENOUS at 13:37

## 2025-01-29 ASSESSMENT — ACTIVITIES OF DAILY LIVING (ADL)
ADLS_ACUITY_SCORE: 32
ADLS_ACUITY_SCORE: 21
ADLS_ACUITY_SCORE: 34
ADLS_ACUITY_SCORE: 34
ADLS_ACUITY_SCORE: 33
ADLS_ACUITY_SCORE: 34
ADLS_ACUITY_SCORE: 32
ADLS_ACUITY_SCORE: 35
ADLS_ACUITY_SCORE: 21
ADLS_ACUITY_SCORE: 22
ADLS_ACUITY_SCORE: 33
ADLS_ACUITY_SCORE: 21

## 2025-01-29 NOTE — PROGRESS NOTES
Reviewing chart due to high probability of admit to Med/Surg unit.     Wiliam Sapp RN on 1/29/2025 at 11:35 AM

## 2025-01-29 NOTE — OP NOTE
Total Knee Arthroplasty Operative Note        PLAN:  Weight bearing status: Weight bearing as tolerated   Activity: Activity as tolerated  Patient may move about with assist as indicated or with supervision   Anticoagulation plan:                 ASA 325mg po every day  for 42 days  Follow up plan                           Follow up in 2 week(s)        Name: Patty Zambrano    PCP: Jenny Guzman    Procedure Date: 1/29/2025    Pre-operative diagnosis: Left knee pain, unspecified chronicity [M25.562]  Osteoarthritis of left knee, unspecified osteoarthritis type [M17.12]   Post-operative diagnosis: Same   Procedure: Total knee arthoplasty (Left)   Surgeon: Howard Yañez MD     Assistant(s): Speedy Martinez PA-C   Anesthesia: Spinal Anesthesia   Estimated blood loss: Less than 50 ml   Drains: Hemovac   Specimens: None       Findings: See full dictated operative note for details   Complications: None       Comments: See dictated operative report for full details       Indications:    The patient has experienced progressive left knee pain despite use of  Analgesics, NSAID's, Injection therapy and physical therapy. Because of the failure of these therapies to control symptoms and limited walking, night pain and altered activities the patient has decided to move ahead with joint replacement surgery.    Procedure and Findings:    After being informed of risks, benefits, alternatives to the procedure, patient desired to proceed, brought to the operating suite where they were placed under spinal anesthetic. Patient received 2 grams of intravenous Ancef.  Speedy Martinez PA-C was present for the entire length of the case for the purposes of proper patient positioning, surgical exposure, and patient safety. A time-out verification step was completed.    Examination of the joint surfaces demonstrated full thickness cartilage loss involving themedial compartments of the left knee.    A midline incision, minimally  invasive approach was utilized. A para-patellar arthrotomy was performed. Attention was first directed to the patella. The patella was everted. It was measured at 32 mm. It was resected, remeasured and a 32 mm asymmetric patella was positioned. A protector plate was placed on the patella. Attention was directed toward the distal femur. IM guider calos was placed. An 8 mm 5 degree distal femoral cut was completed. The IM guide calos was then placed in the tibia. External guide calos and tower were utilized and 9 mm button stylus was referenced off the lateral tibial plateau and cuts were completed. The tibia was sized to a 4. Attention was directed towards the distal femur. It was sized to a 4. The 4-in-1 cutting block was placed along the epicondylar axis. It was secured with 2 pins, anterior, posterior and chamfer cuts were completed. Soft tissue balancing was then carried out. Medial release was completed. Ultimately a 10 mm CS insert gave excellent range of motion and stability throughout the range of motion. Patella was noted to track symmetrically throughout the range to motion. The tibial tray rotation was marked, size was verified, it was secured with 2 pins and punched. Trial components were then removed. The cancellous surfaces were pulsatile lavaged. One batch of Simplex cement with antibiotics was mixed under vacuum. The tibia, femur and patella were sequentially cemented in place. The knee was held in extension with axial compression until the cement had hardened. The 10 mm insert was ultimately selected and secured Care was taken to remove any excess cement. Joint capsular injection with anesthetic solution was performed. Excellent range of motion and stability was demonstrated. A Hemovac drain was placed. The joint was copiously irrigated. Joint capsules were closed with interrupted number 1 running Stratafix. Subcutaneous tissues were closed with 2-0 Vicryl and skin was closed with 3-0 running Stratifix for  wound closure and Aquacell. A sterile dressing was applied. Patient tolerated the procedure well without complication and returned to the PAR in stable condition.      Howard Yañez MD    Date: 1/29/2025 Time: 3:16 PM  ?    CONFIDENTIALITY NOTICE This message and any included attachments are from [facility] and are intended only for the addressee. The information contained in this message is confidential and may constitute inside or non-public information under international, federal, or state securities laws. Unauthorized forwarding, printing, copying, distribution, or use of such information is strictly prohibited and may be unlawful. If you are not the addressee, please promptly delete this message and notify the sender of the delivery error by e-mail.

## 2025-01-29 NOTE — ANESTHESIA POSTPROCEDURE EVALUATION
Patient: Patty Zambrano    Procedure: Procedure(s):  Left Total Knee Arthroplasty       Anesthesia Type:  Spinal    Note:  Disposition: Outpatient   Postop Pain Control: Uneventful            Sign Out: Well controlled pain   PONV: No   Neuro/Psych: Uneventful            Sign Out: Acceptable/Baseline neuro status   Airway/Respiratory: Uneventful            Sign Out: Acceptable/Baseline resp. status   CV/Hemodynamics: Uneventful            Sign Out: Acceptable CV status; No obvious hypovolemia; No obvious fluid overload   Other NRE: NONE   DID A NON-ROUTINE EVENT OCCUR? No           Last vitals:  Vitals Value Taken Time   BP 93/49 01/29/25 1521   Temp     Pulse 77 01/29/25 1526   Resp 10 01/29/25 1526   SpO2 95 % 01/29/25 1526   Vitals shown include unfiled device data.    Electronically Signed By: BUD Bravo CRNA  January 29, 2025  3:27 PM

## 2025-01-29 NOTE — ANESTHESIA PROCEDURE NOTES
"Intrathecal injection Procedure Note    Pre-Procedure   Staff -        CRNA: Eulogio Helms APRN CRNA       Performed By: CRNA       Location: OR       Pre-Anesthestic Checklist: patient identified, IV checked, risks and benefits discussed, informed consent, monitors and equipment checked, pre-op evaluation, at physician/surgeon's request and post-op pain management  Timeout:       Correct Patient: Yes        Correct Procedure: Yes        Correct Site: Yes        Correct Position: Yes   Procedure Documentation  Procedure: intrathecal injection         Patient Position: sitting       Patient Prep/Sterile Barriers: sterile gloves, mask, patient draped       Skin prep: Betadine       Insertion Site: L3-4. (midline approach).       Needle Gauge: 25.        Needle Length (Inches): 3.5        Spinal Needle Type: PencanNo introducer used       # of attempts: 1 and  # of redirects:     Assessment/Narrative         Paresthesias: No.       CSF fluid: clear.    Medication(s) Administered   0.75% Hyperbaric Bupivacaine (Intrathecal) - Intrathecal   1.5 mL - 1/29/2025 1:19:00 PM    FOR [facility] ([facility] ONLY:   Pain Team Contact information: please page the Pain Team Via Easy[employer]. Search \"Pain\". During daytime hours, please page the attending first. At night please page the resident first.      "

## 2025-01-29 NOTE — OR NURSING
Pt progressing well in PACU. Pt has met criteria for transfer to floor. Pt Jamari of 8, remains on 1LPM 02 via NC and is still unable to move legs. Pt dermatomes tested and according to sharp touch, slight lessening of numbness on trunk, thus wearing off in the right direction. Pt denies any complaints. Pt awake, alert and drinking liquids in bed.Floor notified, awaiting call for report.

## 2025-01-29 NOTE — ANESTHESIA CARE TRANSFER NOTE
Patient: Patty Zambrano    Procedure: Procedure(s):  Left Total Knee Arthroplasty       Diagnosis: Left knee pain, unspecified chronicity [M25.562]  Osteoarthritis of left knee, unspecified osteoarthritis type [M17.12]  Diagnosis Additional Information: No value filed.    Anesthesia Type:   Spinal     Note:  Anesthesia Care Transfer Notewriter  Vitals:  Vitals Value Taken Time   BP 81/52 01/29/25 1515   Temp     Pulse 79 01/29/25 1522   Resp 11 01/29/25 1522   SpO2 95 % 01/29/25 1522   Vitals shown include unfiled device data.    Electronically Signed By: BUD Bravo CRNA  January 29, 2025  3:23 PM

## 2025-01-29 NOTE — BRIEF OP NOTE
[facility]    Brief Operative Note    Pre-operative diagnosis: Left knee pain, unspecified chronicity [M25.562]  Osteoarthritis of left knee, unspecified osteoarthritis type [M17.12]  Post-operative diagnosis Same as pre-operative diagnosis    Procedure: Left Total Knee Arthroplasty, Left - Knee    Surgeon: Surgeons and Role:     * Howard Yañez MD - Primary     * Speedy Martinez PA-C - Assisting  Anesthesia: Choice   Estimated Blood Loss: Less than 50 ml    Drains: Hemovac  Specimens: * No specimens in log *  Findings:   None.  Complications: None.  Implants:   Implant Name Type Inv. Item Serial No.  Lot No. LRB No. Used Action   IMP BONE CEMENT STRK SIMPLEX TOBRAMYCIN 40 6197-9-001 - [id number] Cement, Bone IMP BONE CEMENT STRK SIMPLEX TOBRAMYCIN 40 6197-9-001  [employer] [id number] Left 1 Implanted   PIN FIXATION SS FLUTE SQUARE L3.5 IN OD1/8 IN 7650-2038A - [id number] Metallic Hardware/[place] PIN FIXATION SS FLUTE SQUARE L3.5 IN OD1/8 IN 7650-2038A  [employer] [id number] Left 1 Used as a Supply   IMP COMP PATELLA STRK TRI ASYM X3 [id number] 6949-E-545-E - [id number] Total Joint Component/Insert IMP COMP PATELLA STRK TRI ASYM X3 [id number] 5551-G-320-E  [employer] [id number] Left 1 Implanted   IMP COMP FEM STRK TRIATHLN CR LT 4 5510-F-401 - [id number] Total Joint Component/Insert IMP COMP FEM STRK TRIATHLN CR LT 4 5510-F-401  [employer] [id number] Left 1 Implanted   IMP BASEPLATE TIBIAL HOWM TRI 4 5520-B-400 - [id number] Total Joint Component/Insert IMP BASEPLATE TIBIAL HOWM TRI 4 5520-B-400  [employer] [id number] Left 1 Implanted   IMP TIBIAL INSERT STRK X3 TRIATHLON CS SZ4 10MM 9264-S-936-E - [id number] Total Joint Component/Insert IMP TIBIAL INSERT STRK X3 TRIATHLON CS SZ4 10MM 1322-G-058-E  [employer] [id number] Left 1 Implanted

## 2025-01-30 ENCOUNTER — APPOINTMENT (OUTPATIENT)
Dept: PHYSICAL THERAPY | Facility: CLINIC | Age: 80
End: 2025-01-30
Attending: ORTHOPAEDIC SURGERY
Payer: COMMERCIAL

## 2025-01-30 VITALS
DIASTOLIC BLOOD PRESSURE: 51 MMHG | TEMPERATURE: 97.7 F | HEART RATE: 72 BPM | RESPIRATION RATE: 16 BRPM | SYSTOLIC BLOOD PRESSURE: 106 MMHG | OXYGEN SATURATION: 97 %

## 2025-01-30 PROBLEM — Z96.652 S/P TOTAL KNEE ARTHROPLASTY, LEFT: Status: ACTIVE | Noted: 2025-01-30

## 2025-01-30 PROBLEM — F33.8 SEASONAL AFFECTIVE DISORDER: Status: ACTIVE | Noted: 2021-10-08

## 2025-01-30 LAB
GLUCOSE BLDC GLUCOMTR-MCNC: 143 MG/DL (ref 70–99)
HGB BLD-MCNC: 10.2 G/DL (ref 11.7–15.7)

## 2025-01-30 PROCEDURE — 250N000013 HC RX MED GY IP 250 OP 250 PS 637

## 2025-01-30 PROCEDURE — 99214 OFFICE O/P EST MOD 30 MIN: CPT

## 2025-01-30 PROCEDURE — 82962 GLUCOSE BLOOD TEST: CPT

## 2025-01-30 PROCEDURE — 85018 HEMOGLOBIN: CPT | Performed by: ORTHOPAEDIC SURGERY

## 2025-01-30 PROCEDURE — 97110 THERAPEUTIC EXERCISES: CPT | Mod: GP

## 2025-01-30 PROCEDURE — 250N000013 HC RX MED GY IP 250 OP 250 PS 637: Performed by: ORTHOPAEDIC SURGERY

## 2025-01-30 PROCEDURE — 97116 GAIT TRAINING THERAPY: CPT | Mod: GP

## 2025-01-30 PROCEDURE — 36415 COLL VENOUS BLD VENIPUNCTURE: CPT | Performed by: ORTHOPAEDIC SURGERY

## 2025-01-30 PROCEDURE — 97161 PT EVAL LOW COMPLEX 20 MIN: CPT | Mod: GP

## 2025-01-30 PROCEDURE — 250N000011 HC RX IP 250 OP 636: Performed by: ORTHOPAEDIC SURGERY

## 2025-01-30 RX ADMIN — SERTRALINE HYDROCHLORIDE 25 MG: 25 TABLET ORAL at 07:56

## 2025-01-30 RX ADMIN — ACETAMINOPHEN 975 MG: 325 TABLET, FILM COATED ORAL at 07:55

## 2025-01-30 RX ADMIN — FAMOTIDINE 20 MG: 20 TABLET, FILM COATED ORAL at 07:55

## 2025-01-30 RX ADMIN — ASPIRIN 325 MG: 325 TABLET, COATED ORAL at 07:55

## 2025-01-30 RX ADMIN — GABAPENTIN 300 MG: 300 CAPSULE ORAL at 07:55

## 2025-01-30 RX ADMIN — OXYCODONE 5 MG: 5 TABLET ORAL at 04:44

## 2025-01-30 RX ADMIN — OXYCODONE 5 MG: 5 TABLET ORAL at 00:10

## 2025-01-30 RX ADMIN — ACETAMINOPHEN 975 MG: 325 TABLET, FILM COATED ORAL at 00:11

## 2025-01-30 RX ADMIN — DIPHENHYDRAMINE HYDROCHLORIDE 12.5 MG: 25 SOLUTION ORAL at 05:05

## 2025-01-30 RX ADMIN — OXYCODONE 5 MG: 5 TABLET ORAL at 09:19

## 2025-01-30 RX ADMIN — CEFAZOLIN 1 G: 330 INJECTION, POWDER, FOR SOLUTION INTRAMUSCULAR; INTRAVENOUS at 04:37

## 2025-01-30 RX ADMIN — SENNOSIDES AND DOCUSATE SODIUM 1 TABLET: 50; 8.6 TABLET ORAL at 07:55

## 2025-01-30 ASSESSMENT — ACTIVITIES OF DAILY LIVING (ADL)
ADLS_ACUITY_SCORE: 39
ADLS_ACUITY_SCORE: 32
ADLS_ACUITY_SCORE: 32
ADLS_ACUITY_SCORE: 39
ADLS_ACUITY_SCORE: 32
ADLS_ACUITY_SCORE: 39
ADLS_ACUITY_SCORE: 32
ADLS_ACUITY_SCORE: 39
ADLS_ACUITY_SCORE: 32

## 2025-01-30 NOTE — PLAN OF CARE
Patient vital signs are at baseline: Yes  Patient able to ambulate as they were prior to admission or with assist devices provided by therapies during their stay:  Yes  Patient MUST void prior to discharge:  Yes  Patient able to tolerate oral intake:  Yes  Pain has adequate pain control using Oral analgesics:  Yes  Does patient have an identified :  Yes  Has goal D/C date and time been discussed with patient:  Yes    Discharge orders are in and patient to discharge to home with .  Goals have been met.

## 2025-01-30 NOTE — PLAN OF CARE
Patient tolerated regular diet, up to bathroom and voided, and sitting up in chair at this time. VSS, denies dizziness/nausea/pain. Ice pack to left knee. Hemovac drain emptied for 150mL dark red drainage. Denies pain, scheduled tylenol given. Discussed with patient pain medication and pain management plan.

## 2025-01-30 NOTE — PROGRESS NOTES
[facility] Medicine Progress Note  Date of Service: 01/30/2025    Assessment & Plan   Patty Zambrano is a 79 year old female who presented on 1/29/2025 for scheduled Procedure(s):  Left Total Knee Arthroplasty by Howard Yañez MD and is being followed by the hospital medicine service for co-management of acute and/or chronic perioperative medical problems.    S/p Procedure(s): Left Total Knee Arthroplasty   1 Day Post-Op    - pain control, wound cares, physical therapy, occupational therapy and DVT prophylaxis per orthopedic surgery service    Anemia  Hemoglobin POD1 10.2.  Baseline around 13.  Due to routine blood loss, hemodilution, and perioperative fluid shifts.  No signs of ongoing severe bleeding.  -Continue outpatient follow-up with PCP for monitoring    Hyperlipidemia   Managed prior to admission with atorvastatin 20mg daily; continue     Seasonal affective disorder   Managed prior to admission with sertraline 25mg daily         DVT Prophylaxis: as per orthopedic surgery service - ASA 325mg   Code Status: Full Code      Discussion/Disposition: Medically, the patient appears to be recovering appropriately since surgery. Vital signs stable. No medical barriers to discharge at this time.    The patient must be seen and cleared by orthopedics, physical therapy, and occupational therapy prior to discharge.      Medically Ready for Discharge: Anticipated Today        Medical Decision Making       30 MINUTES SPENT BY ME on the date of service doing chart review, history, exam, documentation & further activities per the note.        I have discussed, or will be discussing, the patient with hospitalist physician, Dr. Cirilo Kumar PA-C       Interval History   No acute events overnight. Patient has no acute complaints, no immediate concerns. Tolerating oral intake, denies abdominal pain, abdominal distension, nausea, vomiting. Endorses flatus, has not yet  had a BM. Voiding spontaneously with good urine output. Ambulated with therapies without difficulty.   Denies shortness of breath, chest pain, palpitations, dizziness, lightheadedness.       Physical Exam   Temp:  [97  F (36.1  C)-98  F (36.7  C)] 97.7  F (36.5  C)  Pulse:  [63-87] 72  Resp:  [5-30] 16  BP: ()/(46-72) 106/51  SpO2:  [92 %-98 %] 97 %    Weights: There were no vitals filed for this visit. There is no height or weight on file to calculate BMI.    Constitutional: Well developed adult sitting up in chair. Pleasant, responding to questions appropriately. alert and oriented x3, appears comfortable, nontoxic, in no acute distress.   CV: regular rate & rhythm, no murmurs, rubs, or gallops. Radial and dorsalis pedis pulses 2+ bilaterally. No LE edema.   Respiratory: CTA bilaterally, respirations unlabored on room air.   GI: Bowel sounds present throughout. Abdomen soft, nontender to palpation, nondistended.   Skin: Warm and dry. Surgical site exam deferred to orthopedics.  Musculoskeletal: Normal muscle bulk. Remainder of MSK exam deferred to orthopedics.   Neuro: distal sensation intact to lower extremities bilaterally, speech intact, interacting appropriately.       Data   Recent Labs   Lab 01/30/25  0734 01/30/25  0606 01/29/25  1539 01/29/25  1229 01/29/25  1205   WBC  --   --   --   --  5.0   HGB  --  10.2*  --   --  12.9   MCV  --   --   --   --  95   PLT  --   --   --   --  254   POTASSIUM  --   --   --   --  4.1   CR  --   --   --   --  0.80   *  --  135* 96  --        Recent Labs   Lab 01/30/25  0734 01/29/25  1539 01/29/25  1229   * 135* 96        Unresulted Labs Ordered in the Past 30 Days of this Admission       No orders found for last 31 day(s).             Imaging  Recent Results (from the past 24 hours)   XR Knee Port Left 1/2 Views    Narrative    EXAM: XR KNEE PORT LEFT 1/2 VIEWS  LOCATION: [facility]  DATE: 1/29/2025    INDICATION: Post Op  Total Knee  COMPARISON: None.      Impression    IMPRESSION: Postoperative changes left total knee arthroplasty and patellar resurfacing. Components appear well seated. Surgical drain in place. Postprocedural air within the joint and surrounding soft tissues.        I reviewed all new labs and imaging results over the last 24 hours. I personally reviewed no images or EKG's today.    Medications   Current Facility-Administered Medications   Medication Dose Route Frequency Provider Last Rate Last Admin    lactated ringers infusion   Intravenous Continuous Comfort, Howard CONNELL MD   Stopped at 01/30/25 0629     Current Facility-Administered Medications   Medication Dose Route Frequency Provider Last Rate Last Admin    acetaminophen (TYLENOL) tablet 975 mg  975 mg Oral Q8H Comfort, Howard CONNELL MD   975 mg at 01/30/25 0755    aspirin (ASA) EC tablet 325 mg  325 mg Oral Daily Comfort, Howard CONNELL MD   325 mg at 01/30/25 0755    atorvastatin (LIPITOR) tablet 20 mg  20 mg Oral Daily Rebecca Kumar PA-C   20 mg at 01/29/25 2020    famotidine (PEPCID) tablet 20 mg  20 mg Oral BID Comfort, Howard CONNELL MD   20 mg at 01/30/25 0755    Or    famotidine (PEPCID) injection 20 mg  20 mg Intravenous BID Comfort, Howard CONNELL MD        gabapentin (NEURONTIN) capsule 300 mg  300 mg Oral BID Rebecca Kumar PA-C   300 mg at 01/30/25 0755    polyethylene glycol (MIRALAX) Packet 17 g  17 g Oral Daily Comfort, Howard CONNELL MD        ROPivacaine (NAROPIN) 5 MG/ mg, ketorolac (TORADOL) 30 mg, EPINEPHrine (ADRENALIN) 0.6 mg in sodium chloride 0.9 % 100 mL (ORTHO NGUYEN STANDARD DOSE)   INTRA-ARTICULAR On Call to OR Speedy Martinez PA-C        senna-docusate (SENOKOT-S/PERICOLACE) 8.6-50 MG per tablet 1 tablet  1 tablet Oral BID Comfort, Howard CONNELL MD   1 tablet at 01/30/25 0755    sertraline (ZOLOFT) tablet 25 mg  25 mg Oral Daily Rebecca Kumar PA-C   25 mg at 01/30/25 0756    sodium chloride (PF) 0.9% PF flush 3 mL  3 mL Intracatheter Q8H Comfort,  Howard CONNELL MD   3 mL at 01/29/25 2625       Rebecca Kumar PA-C

## 2025-01-30 NOTE — PROGRESS NOTES
[facility] Progress Note      Post-operative Day: 1 Day Post-Op    Procedure(s):  Left Total Knee Arthroplasty  Subjective:    Pt reports mild pain in the left knee, it is well controlled. She has passed PT and has been seen by medicine. She feels ready to go home later today.     Chest pain, SOB:  no  Nausea, vomiting:  no  Lightheadedness, dizziness:  no  Neuro:  Patient denies new onset numbness or paresthesias      Objective:  Blood pressure 106/51, pulse 72, temperature 97.7  F (36.5  C), temperature source Oral, resp. rate 16, SpO2 97%, not currently breastfeeding.    Patient Vitals for the past 24 hrs:   BP Temp Temp src Pulse Resp SpO2   01/30/25 0725 106/51 97.7  F (36.5  C) Oral 72 16 97 %   01/30/25 0300 94/52 97.5  F (36.4  C) Oral 63 14 94 %   01/29/25 2310 105/46 97.6  F (36.4  C) Oral 65 16 94 %   01/29/25 2050 111/58 -- -- 71 -- 95 %   01/29/25 1942 110/57 97.2  F (36.2  C) Oral 64 13 92 %   01/29/25 1644 111/51 97.7  F (36.5  C) Oral 72 16 95 %   01/29/25 1617 -- -- -- 75 30 94 %   01/29/25 1616 -- -- -- 67 (!) 8 96 %   01/29/25 1615 111/56 -- -- 66 12 96 %   01/29/25 1613 -- -- -- 68 11 97 %   01/29/25 1612 -- -- -- 70 (!) 5 96 %   01/29/25 1611 -- -- -- 66 12 95 %   01/29/25 1610 -- -- -- 69 20 96 %   01/29/25 1609 -- -- -- 71 15 96 %   01/29/25 1608 -- -- -- 66 (!) 7 96 %   01/29/25 1607 -- -- -- 68 (!) 6 96 %   01/29/25 1606 -- -- -- 68 (!) 8 95 %   01/29/25 1605 -- -- -- 71 (!) 9 96 %   01/29/25 1604 -- -- -- 70 (!) 6 96 %   01/29/25 1603 -- -- -- 71 10 96 %   01/29/25 1602 -- -- -- 72 11 96 %   01/29/25 1601 -- -- -- 72 (!) 8 95 %   01/29/25 1600 110/60 -- -- 70 13 93 %   01/29/25 1545 105/54 -- -- 75 11 95 %   01/29/25 1540 98/66 -- -- 75 23 96 %   01/29/25 1530 99/56 -- -- 81 12 95 %   01/29/25 1520 93/49 -- -- 78 13 95 %   01/29/25 1515 (!) 81/52 -- -- 82 14 94 %   01/29/25 1513 (!) 81/56 97  F (36.1  C) Temporal 83 20 92 %   01/29/25 1156 (!) 146/72 98  F (36.7  C) Oral 87 16  98 %       Wt Readings from Last 4 Encounters:   01/15/25 66.4 kg (146 lb 4.8 oz)   11/12/24 67.1 kg (148 lb)   11/03/23 67.2 kg (148 lb 1.6 oz)   05/31/23 61.7 kg (136 lb)       Gen: A&O x 3. NAD. Up to the recliner.   Wound status: Covered, Ace wrap and Hemovac in place.   Circulation, motion and sensation: Dorsiflexion/plantarflexion intact and equal bilaterally; distal lower extremity sensation is intact and equal bilaterally. Foot and toes are warm and well perfused.    Swelling: mild  Calf tenderness: calves are soft and non-tender bilaterally     Pertinent Labs   Lab Results: personally reviewed.     Recent Labs   Lab Test 01/30/25  0606 01/29/25  1205 11/12/24  0924 11/15/23  0740 01/18/23  0724 03/18/22  0935 09/04/18  0713 06/06/18  1130   WBC  --  5.0  --   --   --   --   --  7.6   HGB 10.2* 12.9  --   --  13.4 13.3   < > 14.1   HCT  --  38.2  --   --   --   --   --  42.2   MCV  --  95  --   --   --   --   --  97   PLT  --  254  --   --   --   --   --  282   NA  --   --  138 140  --  137   < >  --     < > = values in this interval not displayed.       Plan:   Continue current cares and rehabilitation.  Anticoagulation protocol:  mg daily  x 42  days  Pain medications:  oxycodone, tylenol, and Robaxin  Weight bearing status:  WBAT  Discontinue Hemovac later today.   Disposition:  Plan for discharge to home with outpatient therapy when medically stable and pain is controlled, cleared by therapy. Later today.               Report completed by:  Jose L Amador PA-C  Date: 1/30/2025  Time: 8:55 AM

## 2025-01-30 NOTE — PROGRESS NOTES
[facility] DISCHARGE NOTE    Patient discharged to home at 11:35 AM via wheel chair. Accompanied by spouse and staff. Discharge instructions reviewed with patient, opportunity offered to ask questions. Prescriptions filled and sent with patient upon discharge. All belongings sent with patient.    Gege Dumont RN

## 2025-01-30 NOTE — DISCHARGE SUMMARY
[facility] Orthopedics Discharge Summary                                  [facility]     SANDOVAL NORIEGA [id number]   Age: 79 year old  PCP: Jenny Guzman, [phone] 1945     Date of Admission:  1/29/2025  Date of Discharge::  1/30/2025  Discharge Physician:  Jose L Amador PA-C    Code status:  Full Code    Admission Information:  Admission Diagnosis:  Left knee pain, unspecified chronicity [M25.562]  Osteoarthritis of left knee, unspecified osteoarthritis type [M17.12]    Post-Operative Day: 1 Day Post-Op     Reason for admission:  The patient was admitted for the following:Procedure(s) (LRB):  Left Total Knee Arthroplasty (Left)    Principal Problem:    S/P total knee arthroplasty, left  Active Problems:    Hyperlipidemia LDL goal <100    Seasonal affective disorder    Left knee DJD      Allergies:  Amoxicillin-pot clavulanate    Following the procedure noted above the patient was transferred to the post-op floor and started on:    Therapy:  physical therapy  Anticoagulation Plan:  mg daily  for 42 days  Pain Management: oxycodone, tylenol, and Robaxin   Weight bearing status: Weight bearing as tolerated     The patient was followed and co-managed by the hospitalist service during the inpatient treatment course  Complications:  None  Consultations:  None     Pertinent Labs   Lab Results: personally reviewed.     Recent Labs   Lab Test 01/30/25  0606 01/29/25  1205 11/12/24  0924 11/15/23  0740 01/18/23  0724 03/18/22  0935 09/04/18  0713 06/06/18  1130   WBC  --  5.0  --   --   --   --   --  7.6   HGB 10.2* 12.9  --   --  13.4 13.3   < > 14.1   HCT  --  38.2  --   --   --   --   --  42.2   MCV  --  95  --   --   --   --   --  97   PLT  --  254  --   --   --   --   --  282   NA  --   --  138 140  --  137   < >  --     < > = values in this interval not displayed.          Discharge Information:  Condition at discharge: Stable  Discharge destination:  Discharged to home      Medications at discharge:  Current Discharge Medication List        START taking these medications    Details   acetaminophen (TYLENOL) 325 MG tablet Take 2 tablets (650 mg) by mouth every 4 hours as needed for other (mild pain).  Qty: 100 tablet, Refills: 0    Associated Diagnoses: Hx of total knee replacement, left      aspirin (ASA) 325 MG EC tablet Take 1 tablet (325 mg) by mouth daily.    Associated Diagnoses: Hx of total knee replacement, left      methocarbamol (ROBAXIN) 500 MG tablet Take 1 tablet (500 mg) by mouth 4 times daily as needed for other (pain).  Qty: 60 tablet, Refills: 1    Associated Diagnoses: Hx of total knee replacement, left      oxyCODONE (ROXICODONE) 5 MG tablet Take 1-2 tablets (5-10 mg) by mouth every 4 hours as needed for moderate to severe pain.  Qty: 30 tablet, Refills: 0    Associated Diagnoses: Hx of total knee replacement, left      senna-docusate (SENOKOT-S/PERICOLACE) 8.6-50 MG tablet Take 1-2 tablets by mouth 2 times daily. Take while on oral narcotics to prevent or treat constipation.    Comments: While taking narcotics  Associated Diagnoses: Hx of total knee replacement, left           CONTINUE these medications which have NOT CHANGED    Details   alendronate (FOSAMAX) 70 MG tablet Take 1 tablet (70 mg) by mouth every 7 days.  Qty: 12 tablet, Refills: 3    Associated Diagnoses: Age-related osteoporosis without current pathological fracture      atorvastatin (LIPITOR) 20 MG tablet Take 1 tablet (20 mg) by mouth daily.  Qty: 90 tablet, Refills: 3    Associated Diagnoses: Hyperlipidemia LDL goal <100      CALCIUM + D 600-200 MG-UNIT OR TABS 2 TABLET DAILY      Cholecalciferol (VITAMIN D) 2000 UNITS tablet Take 2,000 Units by mouth daily  Qty: 100 tablet, Refills: 3      cycloSPORINE (RESTASIS) 0.05 % ophthalmic emulsion Place 1 drop into both eyes 2 times daily      gabapentin (NEURONTIN) 300 MG capsule Take 1 capsule (300 mg) by mouth 2 times daily.  Qty: 180 capsule,  Refills: 3    Associated Diagnoses: Spinal stenosis of lumbar region, unspecified whether neurogenic claudication present      GLUCOSAMINE CHONDRO COMPLEX 500-400 MG OR TABS 3 tablets by mouth daily      MELATONIN PO Take 5 mg by mouth At Bedtime       minoxidil (ROGAINE) 5 % external solution Apply topically 2 times daily 2%      MULTIVITAMIN OR Take 1 tablet by mouth daily      naproxen sodium 220 MG capsule Take 220 mg by mouth 2 times daily (with meals).      Probiotic Product (PROBIOTIC ACIDOPHILUS) CAPS Take 1 capsule by mouth daily       sertraline (ZOLOFT) 25 MG tablet Take 1 tablet (25 mg) by mouth daily.  Qty: 90 tablet, Refills: 3    Associated Diagnoses: Seasonal affective disorder                        Follow-Up Care:  Patient should be seen in the office in 14 days by the Orthopedic Surgeon/Physician Assistant.  Call [phone] for appointment or questions.    Jose L Amador PA-C

## 2025-01-30 NOTE — PLAN OF CARE
Patient has been up to the bathroom to void tonight. Only requiring a standby assist for safety. Moving well. Independent in bed. Ice packs applied to knee. 75mL out from suction drain tonight. Tolerating pain with 5mg Oxy, Tylenol, and robaxin. No c/o nausea or vomiting. VSS.       Problem: Adult Inpatient Plan of Care  Goal: Plan of Care Review  Description: The Plan of Care Review/Shift note should be completed every shift.  The Outcome Evaluation is a brief statement about your assessment that the patient is improving, declining, or no change.  This information will be displayed automatically on your shift  note.  1/30/2025 0624 by Pricilla Garcia RN  Outcome: Progressing  Flowsheets (Taken 1/30/2025 0624)  Plan of Care Reviewed With: patient  Overall Patient Progress: improving  1/30/2025 0623 by Pricilla Garcia RN  Outcome: Progressing  Flowsheets (Taken 1/30/2025 0623)  Plan of Care Reviewed With: patient  Overall Patient Progress: improving     Problem: Knee Arthroplasty  Goal: Optimal Functional Ability  Outcome: Progressing  Intervention: Promote Optimal Functional Status  Recent Flowsheet Documentation  Taken 1/30/2025 0018 by Pricilla Garcia RN  Assistive Device Utilized:   gait belt   walker  Activity Management: activity adjusted per tolerance  Taken 1/29/2025 2310 by Pricilla Garcia RN  Activity Management: activity adjusted per tolerance     Problem: Knee Arthroplasty  Goal: Nausea and Vomiting Relief  Outcome: Met  Goal: Effective Urinary Elimination  Outcome: Met  Goal: Effective Oxygenation and Ventilation  Outcome: Met  Intervention: Optimize Oxygenation and Ventilation  Recent Flowsheet Documentation  Taken 1/30/2025 0018 by Pricilla Garcia RN  Activity Management: activity adjusted per tolerance  Taken 1/29/2025 2310 by Pricilla Garcia RN  Cough And Deep Breathing: done independently per patient  Activity Management: activity adjusted per tolerance   Goal Outcome Evaluation:       Plan of Care Reviewed With: patient    Overall Patient Progress: improvingOverall Patient Progress: improving

## 2025-01-30 NOTE — PROGRESS NOTES
[facility] ADMISSION NOTE    Patient admitted to room 2301 at approximately 1700 via cart from surgery. Patient was accompanied by spouse.     Verbal SBAR report received from Janine  prior to patient arrival.     Patient trasferred to bed via air edward. Patient alert and oriented X 3. The patient is not having any pain.  . Admission vital signs: Blood pressure 111/51, pulse 72, temperature 97.7  F (36.5  C), temperature source Oral, resp. rate 16, SpO2 95%, not currently breastfeeding. Patient was oriented to plan of care, call light, bed controls, tv, telephone, bathroom, and visiting hours.     Risk Assessment    The following safety risks were identified during admission: fall. Yellow risk band applied: YES.     Skin Initial Assessment    This writer admitted this patient and completed a full skin assessment and Suman score in the Adult PCS flowsheet.   Photo documentation of skin problem and/or wound competed via [employer] application (located under Media):  N/A    Appropriate interventions initiated as needed.     Secondary skin check completed by Wiliam.         Education    Patient has a [place] to Observation order: No  Observation education completed and documented: N/A      Sharon Redmond RN

## 2025-01-30 NOTE — PLAN OF CARE
Physical Therapy Discharge Summary    Reason for therapy discharge:    All goals and outcomes met, no further needs identified.    Progress towards therapy goal(s). See goals on Care Plan in [facility] electronic health record for goal details.  Goals met    Therapy recommendation(s):    Continued therapy is recommended.  Rationale/Recommendations:  Recommend continued OP PT to progress L TKA aftercares, pt has scheduled already.

## 2025-01-30 NOTE — PROGRESS NOTES
01/30/25 5894   Appointment Info   Signing Clinician's Name / Credentials (PT) Alejandrina Valdivia, PT   Quick Adds   Quick Adds [facility] Auth & Certification   Living Environment   People in Home spouse   Current Living Arrangements house   Home Accessibility stairs to enter home;stairs within home   Number of Stairs, Main Entrance 2   Stair Railings, Main Entrance railings safe and in good condition   Number of Stairs, Within Home, Primary seven   Stair Railings, Within Home, Primary railings safe and in good condition   Living Environment Comments 3 level split home   Self-Care   Equipment Currently Used at Home shower chair;raised toilet seat;walker, standard   Fall history within last six months no   Activity/Exercise/Self-Care Comment pt typically indep with mobility without devivce, has 2WW. indep with ADL's. spouse able to assist.   General Information   Onset of Illness/Injury or Date of Surgery 01/29/25   Referring Physician Howard Yañez MD   Patient/Family Therapy Goals Statement (PT) return home   Pertinent History of Current Problem (include personal factors and/or comorbidities that impact the POC) 79 y.o. female s/p L TKA performed 1/29, now WBAT without restrictions.   Existing Precautions/Restrictions no known precautions/restrictions   Cognition   Follows Commands (Cognition) WFL   Pain Assessment   Patient Currently in Pain Yes, see Vital Sign flowsheet  (L knee, has been well managed)   Range of Motion (ROM)   Range of Motion ROM deficits secondary to surgical procedure;ROM deficits secondary to pain   Strength (Manual Muscle Testing)   Strength Comments not formally assessed due to pain from recent surgery   Bed Mobility   Comment, (Bed Mobility) up in chair, bed mobility not addressed, anticipate no concerns   Transfers   Comment, (Transfers) sit>stand from recliner with 2WW and Maggy, good standing balance   Gait/Stairs (Locomotion)   [place] Level (Gait) supervision   Assistive Device (Gait)  walker, front-wheeled   Distance in Feet (Gait) 50   Pattern (Gait) step-to   Deviations/Abnormal Patterns (Gait) antalgic;gait speed decreased;weight shifting decreased   Maintains Weight-bearing Status (Gait) able to maintain   Negotiation (Stairs) stairs independence;handrail location;number of steps;ascending technique;descending technique   Bell Level (Stairs) supervision   Handrail Location (Stairs) both sides   Number of Steps (Stairs) 5   Ascending Technique (Stairs) step-to-step   Descending Technique (Stairs) step-to-step   Balance   Balance no deficits were identified   Clinical Impression   Criteria for Skilled Therapeutic Intervention Yes, treatment indicated   PT Diagnosis (PT) impaired mobility s/p L TKA   Influenced by the following impairments pain, reduced ROM, LE weakness   Functional limitations due to impairments impaired gait, stairs   Clinical Presentation (PT Evaluation Complexity) stable   Clinical Presentation Rationale clinical reasoning   Clinical Decision Making (Complexity) low complexity   Planned Therapy Interventions (PT) cryotherapy;gait training;home exercise program;patient/family education;ROM (range of motion);stair training;strengthening;transfer training;progressive activity/exercise;risk factor education;home program guidelines   Risk & Benefits of therapy have been explained evaluation/treatment results reviewed;risks/benefits reviewed;care plan/treatment goals reviewed;current/potential barriers reviewed;participants voiced agreement with care plan;participants included;patient   PT Total Evaluation Time   PT Eval, Low Complexity Minutes (95723) 10   Therapy Certification   Start of care date 01/30/25   Certification date from 01/30/25   Certification date to 01/30/25   Medical Diagnosis s/p L TKA   [facility] Authorization Information   Condition type Initial onset (within last 3 months)   Cause of current episode Post Surgical   Nature of treatment Rehabilitative    Functional ability Moderate functional limitations   Documented POC (choose all that apply) Measurable short and long term/discharge treatment goals related to physical and functional deficits.;Frequency of treatment visits and treatment activities to address deficit areas.;Patient agrees to program participation including home program   Briefly describe symptoms pain, reduced L knee ROM, weakness   How did the symptoms start after surgery performed 1/29/25   Last 24H: avg pain/symptom intensity  4/10   Past wk: avg pain/symptom intensity 3/10   Frequency of Symptoms Constantly (% of the time)   Symptom impact on ADLs Moderately   Condition change since eval N/A (first visit)   General health reported by patient Very good   Type of surgery 5-Joint Replacement   Physical Therapy Goals   PT Frequency One time eval and treatment only   PT Predicted Duration/Target Date for Goal Attainment 01/30/25   PT Goals Gait;Stairs;PT Goal 1   PT: Gait Supervision/stand-by assist;Standard walker;150 feet;Goal Met   PT: Stairs Supervision/stand-by assist;5 stairs;Rail on both sides;Goal Met   PT: Goal 1 Pt will be indep in L TKA HEP in order to progress aftercares. Goal met.   Interventions   Interventions Quick Adds Gait Training;Therapeutic Procedure   Therapeutic Procedure/Exercise   Ther. Procedure: strength, endurance, ROM, flexibillity Minutes (11231) 9   Symptoms Noted During/After Treatment increased pain   Treatment Detail/Skilled Intervention instructed through L TKA HEP following printed handout to progress aftercares: ankle pumps, quad sets, glute sets, hamstring sets, heel slides, SAQ, hip abd, and SLR to be complete 2x/day x10 reps each in recliner chair or bed with legs straight. pt verbalized understanding of exercises.   Gait Training   Gait Training Minutes (84027) 15   Symptoms Noted During/After Treatment (Gait Training) increased pain   Treatment Detail/Skilled Intervention amb in hallway x100 feet with  "2WW and Maggy, good step through pattern with intact heel strike at initial contact, reports her knee feels \"unstable\", edu on continued use of walker to improve stabiluty. edu on proper sequencing of stairs to reduce pain, good carryover, spouse will assist with walker management up/down stairs.   Distance in Feet 100   Major Level (Gait Training) stand-by assist   Physical Assistance Level (Gait Training) verbal cues;supervision   Weight Bearing (Gait Training) weight-bearing as tolerated   Assistive Device (Gait Training) standard walker   Gait Analysis Deviations decreased noble;decreased stride length;decreased weight-shifting ability   Impairments (Gait Analysis/Training) pain;ROM decreased;strength decreased   Stair Railings present at both sides   Physical Assist/Nonphysical Assist (Stairs) supervision;verbal cues   Level of Major (Stairs) stand-by assist   PT Discharge Planning   PT Plan d/c PT, goals met   PT Discharge Recommendation (DC Rec) home with assist;home with outpatient physical therapy   PT Rationale for DC Rec rec OP PT to progress L TKA aftercares, has scheduled   PT Brief overview of current status amb 150 feet wtih 2WW and Maggy, up/down 5 stairs   PT Total Distance Amb During Session (feet) 150   PT Equipment Needed at Discharge   (has all equipment)   Physical Therapy Time and Intention   Timed Code Treatment Minutes 24   Total Session Time (sum of timed and untimed services) 34   [facility]                                                                                   OUTPATIENT PHYSICAL THERAPY    PLAN OF TREATMENT FOR OUTPATIENT REHABILITATION   Patient's Last Name, First Name, Patty Heath YOB: 1945   Provider's Name   [facility]   Medical Record No.  [id number]     Onset Date: 01/29/25 Start of Care Date: 01/30/25     Medical Diagnosis:  s/p L TKA               PT Diagnosis:  impaired " mobility s/p L TKA Certification Dates:  From: 01/30/25  To: 01/30/25       See note for plan of treatment, functional goals, and certification details.    I CERTIFY THE NEED FOR THESE SERVICES FURNISHED UNDER        THIS PLAN OF TREATMENT AND WHILE UNDER MY CARE (Physician co-signature of this document indicates review and certification of the therapy plan).

## 2025-02-01 ASSESSMENT — ACTIVITIES OF DAILY LIVING (ADL)
WALK: ACTIVITY IS FAIRLY DIFFICULT
HOW_WOULD_YOU_RATE_THE_CURRENT_FUNCTION_OF_YOUR_KNEE_DURING_YOUR_USUAL_DAILY_ACTIVITIES_ON_A_SCALE_FROM_0_TO_100_WITH_100_BEING_YOUR_LEVEL_OF_KNEE_FUNCTION_PRIOR_TO_YOUR_INJURY_AND_0_BEING_THE_INABILITY_TO_PERFORM_ANY_OF_YOUR_USUAL_DAILY_ACTIVITIES?: 20
STAND: ACTIVITY IS FAIRLY DIFFICULT
LIMPING: THE SYMPTOM AFFECTS MY ACTIVITY SEVERELY
SWELLING: THE SYMPTOM AFFECTS MY ACTIVITY MODERATELY
AS_A_RESULT_OF_YOUR_KNEE_INJURY,_HOW_WOULD_YOU_RATE_YOUR_CURRENT_LEVEL_OF_DAILY_ACTIVITY?: ABNORMAL
RISE FROM A CHAIR: ACTIVITY IS FAIRLY DIFFICULT
PLEASE_INDICATE_YOR_PRIMARY_REASON_FOR_REFERRAL_TO_THERAPY:: KNEE
AS_A_RESULT_OF_YOUR_KNEE_INJURY,_HOW_WOULD_YOU_RATE_YOUR_CURRENT_LEVEL_OF_DAILY_ACTIVITY?: ABNORMAL
STIFFNESS: THE SYMPTOM AFFECTS MY ACTIVITY SEVERELY
HOW_WOULD_YOU_RATE_THE_CURRENT_FUNCTION_OF_YOUR_KNEE_DURING_YOUR_USUAL_DAILY_ACTIVITIES_ON_A_SCALE_FROM_0_TO_100_WITH_100_BEING_YOUR_LEVEL_OF_KNEE_FUNCTION_PRIOR_TO_YOUR_INJURY_AND_0_BEING_THE_INABILITY_TO_PERFORM_ANY_OF_YOUR_USUAL_DAILY_ACTIVITIES?: 20
GIVING WAY, BUCKLING OR SHIFTING OF KNEE: THE SYMPTOM AFFECTS MY ACTIVITY MODERATELY
KNEE_ACTIVITY_OF_DAILY_LIVING_SCORE: 31.43
GO UP STAIRS: ACTIVITY IS SOMEWHAT DIFFICULT
SQUAT: I AM UNABLE TO DO THE ACTIVITY
HOW_WOULD_YOU_RATE_THE_OVERALL_FUNCTION_OF_YOUR_KNEE_DURING_YOUR_USUAL_DAILY_ACTIVITIES?: ABNORMAL
RAW_SCORE: 22
STAND: ACTIVITY IS FAIRLY DIFFICULT
WEAKNESS: THE SYMPTOM AFFECTS MY ACTIVITY SEVERELY
WEAKNESS: THE SYMPTOM AFFECTS MY ACTIVITY SEVERELY
STIFFNESS: THE SYMPTOM AFFECTS MY ACTIVITY SEVERELY
GO DOWN STAIRS: ACTIVITY IS SOMEWHAT DIFFICULT
PAIN: THE SYMPTOM AFFECTS MY ACTIVITY SEVERELY
RISE FROM A CHAIR: ACTIVITY IS FAIRLY DIFFICULT
LIMPING: THE SYMPTOM AFFECTS MY ACTIVITY SEVERELY
GIVING WAY, BUCKLING OR SHIFTING OF KNEE: THE SYMPTOM AFFECTS MY ACTIVITY MODERATELY
SQUAT: I AM UNABLE TO DO THE ACTIVITY
SWELLING: THE SYMPTOM AFFECTS MY ACTIVITY MODERATELY
GO UP STAIRS: ACTIVITY IS SOMEWHAT DIFFICULT
HOW_WOULD_YOU_RATE_THE_OVERALL_FUNCTION_OF_YOUR_KNEE_DURING_YOUR_USUAL_DAILY_ACTIVITIES?: ABNORMAL
GO DOWN STAIRS: ACTIVITY IS SOMEWHAT DIFFICULT
KNEEL ON THE FRONT OF YOUR KNEE: I AM UNABLE TO DO THE ACTIVITY
SIT WITH YOUR KNEE BENT: ACTIVITY IS FAIRLY DIFFICULT
KNEEL ON THE FRONT OF YOUR KNEE: I AM UNABLE TO DO THE ACTIVITY
KNEE_ACTIVITY_OF_DAILY_LIVING_SUM: 22
SIT WITH YOUR KNEE BENT: ACTIVITY IS FAIRLY DIFFICULT
PAIN: THE SYMPTOM AFFECTS MY ACTIVITY SEVERELY
WALK: ACTIVITY IS FAIRLY DIFFICULT

## 2025-02-03 ENCOUNTER — THERAPY VISIT (OUTPATIENT)
Dept: PHYSICAL THERAPY | Facility: CLINIC | Age: 80
End: 2025-02-03
Attending: ORTHOPAEDIC SURGERY
Payer: COMMERCIAL

## 2025-02-03 DIAGNOSIS — Z96.652 S/P TOTAL KNEE ARTHROPLASTY, LEFT: Primary | ICD-10-CM

## 2025-02-03 PROCEDURE — 97110 THERAPEUTIC EXERCISES: CPT | Mod: GP | Performed by: PHYSICAL THERAPIST

## 2025-02-03 PROCEDURE — 97161 PT EVAL LOW COMPLEX 20 MIN: CPT | Mod: GP | Performed by: PHYSICAL THERAPIST

## 2025-02-03 NOTE — PROGRESS NOTES
PHYSICAL THERAPY EVALUATION  Type of Visit: Evaluation              Subjective      Condition type:  Initial onset (within last 3 months)  Cause of current episode:  Post Surgical  Type of surgery: 5-Joint Replacement  Nature of treatment:  Rehabilitative  Functional ability:  Moderate functional limitations  Documented POC (choose all that apply):  Measurable short and long term/discharge treatment goals related to physical and functional deficits.;Frequency of treatment visits and treatment activities to address deficit areas.;Patient agrees to program participation including home program  Briefly describe symptoms:     How did the symptoms start:  chronic knee problems w/ recent surgery  Average pain/intensity last 24 hours:  5/10  Average pain/intensity past week:  8/10  Frequency of Symptoms:  Constantly (% of the time)  Symptom impact on ADLs:  Quite a bit  Condition change since eval:  N/A (first visit)  General health reported by patient:  Very good      Presenting condition or subjective complaint: Left total knee arthroplasty on 1/29/2025.  Pt notes constant pain @ best 2/10, @ worst 9/10.  Meds: pt is weaning from oxycodone, tylenol, alleve, robaxin.  Pt normally takes gabapentin.  Pt is walking w/ FWW.   Pt is not driving.  Waking 1X/night .    Date of onset: 01/29/25    Relevant medical history: Hearing problems; Osteoarthritis   Dates & types of surgery:      Prior diagnostic imaging/testing results: X-ray     Prior therapy history for the same diagnosis, illness or injury: No      Prior Level of Function  Transfers: Independent  Ambulation: Independent  ADL: Independent  IADL: Housekeeping    Living Environment  Social support: With a significant other or spouse   Type of home: House; Multi-level   Stairs to enter the home: Yes 2 Is there a railing: No     Ramp: No   Stairs inside the home: Yes 14 Is there a railing: Yes     Help at home: None  Equipment owned: Straight Cane; Walker with wheels;  Raised toilet seat; Bath bench     Employment: No    Hobbies/Interests: Reading, book club, volunteering, gardening, walking, golf    Patient goals for therapy: Per protocol       Objective   KNEE EVALUATION    INTEGUMENTARY (edema, incisions): bandage in place.  Girth suprapatellar: R 38.5 cm, L 45.0 cm.  Mild redness lateral aspect of bandage (pt will monitor and if increases will contact MD).  Purple bruising into inner thigh/ post knee  GAIT:  Mild limp, slower pace w/ FWW.  ROM: KE 0* in long sit.  KF w/ heelslide AROM 100*,  AA w/ sheet 102*  STRENGTH: Good quad set and SLR      Assessment & Plan   CLINICAL IMPRESSIONS  Medical Diagnosis: s/p L TKA    Treatment Diagnosis: decreased motion/ strength/ mobility following TKA   Impression/Assessment: Patient is a 79 year old female with L knee complaints.  The following significant findings have been identified: Pain, Decreased ROM/flexibility, Decreased strength, Edema, Impaired gait, Impaired muscle performance, and Decreased activity tolerance. These impairments interfere with their ability to perform self care tasks, recreational activities, household chores, driving , household mobility, and community mobility as compared to previous level of function.     Clinical Decision Making (Complexity):  Clinical Presentation: Stable/Uncomplicated  Clinical Presentation Rationale: based on medical and personal factors listed in PT evaluation  Clinical Decision Making (Complexity): Low complexity    PLAN OF CARE  Treatment Interventions:  Interventions: Gait Training, Manual Therapy, Neuromuscular Re-education, Therapeutic Activity, Therapeutic Exercise    Long Term Goals     PT Goal 1  Goal Identifier: 1.  Goal Description: Pt will be able to walk w/o assistive device w/ slight to no limp  Target Date: 03/03/25  PT Goal 2  Goal Identifier: 2.  Goal Description: Pt will have increased KF to 120* for increased ease w/ ADL's  Target Date: 03/17/25  PT Goal 3  Goal  Identifier: 3.  Goal Description: Pt will be able to do stairs reciprocal w/ 1 rail and minimal difficulty  Target Date: 03/17/25  PT Goal 4  Goal Identifier: 4.  Goal Description: Pt will be independent and consistent w/HEP to improve mobility/ strength  Target Date: 03/17/25      Frequency of Treatment: 2X/ week X 4 weeks then 1X/week X 2 weeks  Duration of Treatment: 10 visits in 6 weeks    Recommended Referrals to Other Professionals:  none  Education Assessment:   Learner/Method: Patient;Listening;Reading;Demonstration;Pictures/Video;No Barriers to Learning    Risks and benefits of evaluation/treatment have been explained.   Patient/Family/caregiver agrees with Plan of Care.     Evaluation Time:     PT Eval, Low Complexity Minutes (43390): 15       Signing Clinician: Yolis Guzman PT        [facility]                                                                                   OUTPATIENT PHYSICAL THERAPY      PLAN OF TREATMENT FOR OUTPATIENT REHABILITATION   Patient's Last Name, First Name, Patty Heath YOB: 1945   Provider's Name   [facility]   Medical Record No.  [id number]     Onset Date: 01/29/25  Start of Care Date: 02/03/25     Medical Diagnosis:  s/p L TKA      PT Treatment Diagnosis:  decreased motion/ strength/ mobility following TKA Plan of Treatment  Frequency/Duration: 2X/ week X 4 weeks then 1X/week X 2 weeks/ 10 visits in 6 weeks    Certification date from 02/03/25 to 03/17/25         See note for plan of treatment details and functional goals     Yolis Guzman, PT                         I CERTIFY THE NEED FOR THESE SERVICES FURNISHED UNDER        THIS PLAN OF TREATMENT AND WHILE UNDER MY CARE .             Physician Signature               Date    X_____________________________________________________                  Referring Provider:  Howard Yañez    Initial Assessment  See Epic Evaluation- Start  of Care Date: 02/03/25

## 2025-02-11 ENCOUNTER — THERAPY VISIT (OUTPATIENT)
Dept: PHYSICAL THERAPY | Facility: CLINIC | Age: 80
End: 2025-02-11
Attending: ORTHOPAEDIC SURGERY
Payer: COMMERCIAL

## 2025-02-11 DIAGNOSIS — Z96.652 S/P TOTAL KNEE ARTHROPLASTY, LEFT: Primary | ICD-10-CM

## 2025-02-11 PROCEDURE — 97110 THERAPEUTIC EXERCISES: CPT | Mod: GP | Performed by: PHYSICAL THERAPIST

## 2025-02-12 ENCOUNTER — TRANSFERRED RECORDS (OUTPATIENT)
Dept: HEALTH INFORMATION MANAGEMENT | Facility: CLINIC | Age: 80
End: 2025-02-12
Payer: COMMERCIAL

## 2025-02-18 ENCOUNTER — THERAPY VISIT (OUTPATIENT)
Dept: PHYSICAL THERAPY | Facility: CLINIC | Age: 80
End: 2025-02-18
Attending: ORTHOPAEDIC SURGERY
Payer: COMMERCIAL

## 2025-02-18 DIAGNOSIS — Z96.652 S/P TOTAL KNEE ARTHROPLASTY, LEFT: Primary | ICD-10-CM

## 2025-02-18 PROCEDURE — 97110 THERAPEUTIC EXERCISES: CPT | Mod: GP | Performed by: PHYSICAL THERAPIST

## 2025-02-25 ENCOUNTER — TRANSFERRED RECORDS (OUTPATIENT)
Dept: HEALTH INFORMATION MANAGEMENT | Facility: CLINIC | Age: 80
End: 2025-02-25
Payer: COMMERCIAL

## 2025-03-12 ENCOUNTER — TRANSFERRED RECORDS (OUTPATIENT)
Dept: HEALTH INFORMATION MANAGEMENT | Facility: CLINIC | Age: 80
End: 2025-03-12

## 2025-03-12 ENCOUNTER — THERAPY VISIT (OUTPATIENT)
Dept: PHYSICAL THERAPY | Facility: CLINIC | Age: 80
End: 2025-03-12
Attending: ORTHOPAEDIC SURGERY
Payer: COMMERCIAL

## 2025-03-12 ENCOUNTER — TRANSCRIBE ORDERS (OUTPATIENT)
Dept: OTHER | Age: 80
End: 2025-03-12

## 2025-03-12 DIAGNOSIS — Z47.89 AFTERCARE FOLLOWING SURGERY OF THE MUSCULOSKELETAL SYSTEM: Primary | ICD-10-CM

## 2025-03-12 DIAGNOSIS — Z96.652 S/P TOTAL KNEE ARTHROPLASTY, LEFT: Primary | ICD-10-CM

## 2025-03-12 PROCEDURE — 97110 THERAPEUTIC EXERCISES: CPT | Mod: GP | Performed by: PHYSICAL THERAPIST

## 2025-03-14 ENCOUNTER — THERAPY VISIT (OUTPATIENT)
Dept: PHYSICAL THERAPY | Facility: CLINIC | Age: 80
End: 2025-03-14
Attending: ORTHOPAEDIC SURGERY
Payer: COMMERCIAL

## 2025-03-14 DIAGNOSIS — I89.0 LYMPHEDEMA: Primary | Chronic | ICD-10-CM

## 2025-03-14 PROCEDURE — 97140 MANUAL THERAPY 1/> REGIONS: CPT | Mod: GP

## 2025-03-14 PROCEDURE — 97161 PT EVAL LOW COMPLEX 20 MIN: CPT | Mod: GP

## 2025-03-14 NOTE — PROGRESS NOTES
PHYSICAL THERAPY EVALUATION  Type of Visit: Evaluation       Fall Risk Screen:  Fall screen completed by: PT  Have you fallen 2 or more times in the past year?: No  Have you fallen and had an injury in the past year?: No  Is patient a fall risk?: No    Subjective      Condition type:  Initial onset (within last 3 months)  Cause of current episode:  Post Surgical  Type of surgery: 5-Joint Replacement  Nature of treatment:  Rehabilitative  Functional ability:  Minimal functional limitations  Documented POC (choose all that apply):  Measurable short and long term/discharge treatment goals related to physical and functional deficits.;Frequency of treatment visits and treatment activities to address deficit areas.;Patient agrees to program participation including home program  Briefly describe symptoms:  chronic knee problems including increased edema w/ recent surgery (6wks post-op)  How did the symptoms start:  6-wks post-op L TKA  Average pain/intensity last 24 hours:  2/10  Average pain/intensity past week:  2/10  Frequency of Symptoms:  Constantly (% of the time)  Symptom impact on ADLs:  Moderately  Condition change since eval:  N/A (first visit)  General health reported by patient:  Very good      Presenting condition or subjective complaint: Swelling of left leg following left TKA  Date of onset: 01/29/25    Relevant medical history: Arthritis; Hearing problems; Osteoarthritis L knee DJD, Raynaud's syndrome, diverticulitis of colon, cervical HPV, brachial neuritis or radiculitis NOS thoracic outlet syndrome (left sided), SAD. Spinal stenosis of lumbar region  Dates & types of surgery: Eye surgery 2017 and 2019, carpal tunnel surgery left 2020, left TKA 2025    Prior diagnostic imaging/testing results:     n/a  Prior therapy history for the same diagnosis, illness or injury: Yes Physical therapy after TKA included use of tubigrip for swelling    Prior Level of Function  Transfers: Independent  Ambulation:  Independent  ADL: Independent  IADL: Driving, Finances, Housekeeping, Laundry, Meal preparation, Medication management    Living Environment  Social support: With a significant other or spouse   Type of home: House; Multi-level   Stairs to enter the home: Yes 2 Is there a railing: Yes     Ramp: No   Stairs inside the home: Yes 14 Is there a railing: Yes     Help at home: None  Equipment owned:       Employment: No    Hobbies/Interests: Reading, genealogy, volunteering at hospital and school, hiking/walking, gardening, golf    Patient goals for therapy: Resume regular activities without increased swelling of leg during the day    Pain assessment: Pain present - very low and well managed, 2/10 with flexion ROM, elevation helps.     Objective       EDEMA EVALUATION  Additional history:  Body part affected by edema: Left leg  If cancer related, treatment:    If not cancer related, problems with veins or cause of swelling: Related to left TKA on 1/29/2025  Distance able to walk: 1/2 to 3/4 mile  Time able to stand: 20 minutes  Sensation problems in hands/feet: No    Edema etiology: Surgery, TKA    FUNCTIONAL SCALES   Lymphedema Life Impact Scale (LLIS): 17    Cognitive Status Examination  Orientation: Oriented to person, place and time   Level of Consciousness: Alert  Follows Commands and Answers Questions: 100% of the time  Personal Safety and Judgement: Intact  Memory: Intact    EDEMA  Skin Condition: Intact, Pitting, Temperature  Scar: Yes - L TKA incisional scar - closed and ready for scar tissue mobilization, per ortho team  Capillary Refill: Symmetrical  Radial Pulse: Symmetrical  Dorsal Pedal Pulse: Symmetrical  Stemmer Sign: +  Ulceration: No    GIRTH MEASUREMENTS: Refer to separate girth measurement flowsheet for specific measurements.    VOLUME LE  Right LE Total Volume (mL): 2807.68  Left LE Total Volume (mL): 3316.35  LE Limb Comparison:  Identify AFFECTED Limb Volume-Vi (ml): 3316.4  Identify UNAFFECTED Limb  Volume-Vu (ml): 2807.7  % LE Swelling: 15.3  LE Limb % Difference: 18.12    RANGE OF MOTION: LE ROM WFL - 120 degrees L knee flexion PROM  STRENGTH: LE Strength WFL  POSTURE: WFL  PALPATION: L knee is warm to touch and lower leg pitting 2+-3 with palpation  ACTIVITIES OF DAILY LIVING: IND  BED MOBILITY: WFL  TRANSFERS: Independent  GAIT/LOCOMOTION: IND  BALANCE: WFL  SENSATION: LE Sensation WNL  VASCULAR:  no concerns  COORDINATION: WFL  MUSCLE TONE: WFL    Assessment & Plan   CLINICAL IMPRESSIONS  Medical Diagnosis: s/p L TKA- Aftercare following surgery of the musculoskeletal system (Z47.89)    Treatment Diagnosis: L LE lymphedema s/p L TKA   Impression/Assessment: Patient is a 80 year old female with worsening complaints of L LE swelling 6wks post-op L TKA.  The following significant findings have been identified: Pain, Inflammation, Edema, Impaired muscle performance, and Decreased activity tolerance. These impairments interfere with their ability to perform self care tasks, recreational activities, and community mobility as compared to previous level of function.     Clinical Decision Making (Complexity):  Clinical Presentation: Evolving/Changing  Clinical Presentation Rationale: based on medical and personal factors listed in PT evaluation  Clinical Decision Making (Complexity): Low complexity    PLAN OF CARE  Treatment Interventions:  Interventions: Gait Training, Manual Therapy, Neuromuscular Re-education, Therapeutic Activity, Therapeutic Exercise, Self-Care/Home Management, Gradient Compression Bandaging    Long Term Goals     PT Goal 1  Goal Identifier: STG 1  Goal Description: Pt and caregiver to be IND in CGB technique for self-care of L LE lymphedema mgmt  Rationale: to maximize safety and independence with performance of ADLs and functional tasks  Target Date: 04/11/25  PT Goal 2  Goal Identifier: STG 2  Goal Description: Pt will show an improvement in skin integrity and complete healing of incision  with scar tissue mobilization and IND with self-STM in order to prevent chronic adhesions and promote max return of AROM of L knee flexion  Rationale: to maximize safety and independence with performance of ADLs and functional tasks  Goal Progress: Upon Eval: 120 degrees flexion  Target Date: 05/09/25  PT Goal 3  Goal Identifier: LTG 1  Goal Description: Pt will independetly manage don/doff and long-term mgmt of compression garments to promote IND return to previous recreational activities in the community.  Rationale: to maximize safety and independence with performance of ADLs and functional tasks  Target Date: 06/06/25  PT Goal 4  Goal Identifier: LTG 4 - LLIS  Goal Description: Pt will show a statistically significant improvement in her LLIS score with at least an 8pt decrease indicating improvement in overall QOL on max return to her PLOF  Rationale: to maximize safety and independence with performance of ADLs and functional tasks  Goal Progress: Upon Eval: LLIS score of 17  Target Date: 06/06/25      Frequency of Treatment: 3x/wk  Duration of Treatment: 12wks    Recommended Referrals to Other Professionals: Physical Therapy  Education Assessment:        Risks and benefits of evaluation/treatment have been explained.   Patient/Family/caregiver agrees with Plan of Care.     Evaluation Time:     PT Jas Low Complexity Minutes (52705): 30   Present: Not applicable     Signing Clinician: Rosario Matute, PT        [facility]                                                                                   OUTPATIENT PHYSICAL THERAPY      PLAN OF TREATMENT FOR OUTPATIENT REHABILITATION   Patient's Last Name, First Name, JUAN DANIEL ZambranoPatty  AURA YOB: 1945   Provider's Name   [facility]   Medical Record No.  [id number]     Onset Date: 01/29/25  Start of Care Date: 03/14/25     Medical Diagnosis:  s/p L TKA- Aftercare following  surgery of the musculoskeletal system (Z47.89)      PT Treatment Diagnosis:  L LE lymphedema s/p L TKA Plan of Treatment  Frequency/Duration: 3x/wk/ 12wks    Certification date from 03/14/25 to 06/06/25         See note for plan of treatment details and functional goals     Rosario Matute, PT                         I CERTIFY THE NEED FOR THESE SERVICES FURNISHED UNDER        THIS PLAN OF TREATMENT AND WHILE UNDER MY CARE     (Physician attestation of this document indicates review and certification of the therapy plan).              Referring Provider:  Howard Yañez    Initial Assessment  See Epic Evaluation- Start of Care Date: 03/14/25      Howard Yañez MD

## 2025-03-17 ENCOUNTER — THERAPY VISIT (OUTPATIENT)
Dept: PHYSICAL THERAPY | Facility: CLINIC | Age: 80
End: 2025-03-17
Attending: ORTHOPAEDIC SURGERY
Payer: COMMERCIAL

## 2025-03-17 DIAGNOSIS — I89.0 LYMPHEDEMA: Primary | Chronic | ICD-10-CM

## 2025-03-17 PROCEDURE — 97140 MANUAL THERAPY 1/> REGIONS: CPT | Mod: GP

## 2025-03-24 ENCOUNTER — THERAPY VISIT (OUTPATIENT)
Dept: PHYSICAL THERAPY | Facility: CLINIC | Age: 80
End: 2025-03-24
Attending: ORTHOPAEDIC SURGERY
Payer: COMMERCIAL

## 2025-03-24 DIAGNOSIS — I89.0 LYMPHEDEMA: Primary | Chronic | ICD-10-CM

## 2025-03-24 PROCEDURE — 97140 MANUAL THERAPY 1/> REGIONS: CPT | Mod: GP

## 2025-03-24 PROCEDURE — 97110 THERAPEUTIC EXERCISES: CPT | Mod: GP

## 2025-03-27 ENCOUNTER — THERAPY VISIT (OUTPATIENT)
Dept: PHYSICAL THERAPY | Facility: CLINIC | Age: 80
End: 2025-03-27
Attending: ORTHOPAEDIC SURGERY
Payer: COMMERCIAL

## 2025-03-27 DIAGNOSIS — I89.0 LYMPHEDEMA: Primary | Chronic | ICD-10-CM

## 2025-03-27 PROCEDURE — 97140 MANUAL THERAPY 1/> REGIONS: CPT | Mod: GP

## 2025-03-31 ENCOUNTER — THERAPY VISIT (OUTPATIENT)
Dept: PHYSICAL THERAPY | Facility: CLINIC | Age: 80
End: 2025-03-31
Attending: ORTHOPAEDIC SURGERY
Payer: COMMERCIAL

## 2025-03-31 DIAGNOSIS — Z96.652 S/P TOTAL KNEE ARTHROPLASTY, LEFT: Primary | ICD-10-CM

## 2025-03-31 PROCEDURE — 97110 THERAPEUTIC EXERCISES: CPT | Mod: GP | Performed by: PHYSICAL THERAPIST

## 2025-03-31 NOTE — PROGRESS NOTES
03/31/25 0500   Appointment Info   Signing clinician's name / credentials Yolis Guzman, PT   Visits Used 10   Medical Diagnosis s/p L TKA   PT Tx Diagnosis decreased motion/ strength/ mobility following TKA   Progress Note/Certification   Start of Care Date 02/03/25   Onset of illness/injury or Date of Surgery 01/29/25   Therapy Frequency 2 vists   Predicted Duration 2 visits in 4 weeks   Certification date from 03/17/25   Certification date to 04/14/25   Progress Note Due Date 04/14/25   Progress Note Completed Date 03/31/25   [facility] Authorization Information   Condition type Initial onset (within last 3 months)   Cause of current episode Post Surgical   Type of surgery 5-Joint Replacement   Nature of treatment Rehabilitative   Documented POC (choose all that apply) Measurable short and long term/discharge treatment goals related to physical and functional deficits.;Frequency of treatment visits and treatment activities to address deficit areas.;Patient agrees to program participation including home program   How did the symptoms start chronic knee problems w/ recent surgery   General health reported by patient Very good   GOALS   PT Goals 2;3;4   PT Goal 1   Goal Identifier 1.   Goal Description Pt will be able to walk w/o assistive device w/ slight to no limp   Target Date 03/03/25   Date Met 02/14/25   PT Goal 2   Goal Identifier 2.   Goal Description Pt will have increased KF to 120* for increased ease w/ ADL's   Target Date 03/17/25   Date Met 03/31/25   PT Goal 3   Goal Identifier 3.   Goal Description Pt will be able to do stairs reciprocal w/ 1 rail and minimal difficulty   Goal Progress up reciprocal   Target Date 03/17/25   Date Met 03/07/25   PT Goal 4   Goal Identifier 4.   Goal Description Pt will be independent and consistent w/HEP to improve mobility/ strength   Target Date 03/17/25   Date Met 03/31/25   Subjective Report   Subjective Report Pt notes decreased swelling w/ going to lymphedema  "therapy.   Objective Measures    No limp w/o device.    L KF w/ heelslide A 120*,  *   Therapeutic Procedure/Exercise   Ther Proc 1 - Details Scifit seat 8 (by window) L 3 X 3 min.  KF on step X 10.  fwd step up  7\" X 15.  STS from chair thigh w/o UE assist X 15   heelslide w/ sheet X 10.       SLS (frequent touches)   Skilled Intervention ex to improve ROM/ strength and endurance to improve daily function   Patient Response/Progress improving strength and ROM   Education   Learner/Method Patient;Listening;Reading;Demonstration;Pictures/Video;No Barriers to Learning   Plan   Home program ex per PTRX, pin to phone   Plan Pt to cont on own w/HEP.  Chart open X 30 days   Comments   Comments Pt has met goals         [facility]                                                                                   OUTPATIENT PHYSICAL THERAPY    PLAN OF TREATMENT FOR OUTPATIENT REHABILITATION   Patient's Last Name, First Name, AUGUSTElijahDEBBIElijah  Patty Zambrano  AURA YOB: 1945   Provider's Name   [facility]   Medical Record No.  [id number]     Onset Date: 01/29/25  Start of Care Date: 02/03/25     Medical Diagnosis:  s/p L TKA      PT Treatment Diagnosis:  decreased motion/ strength/ mobility following TKA Plan of Treatment  Frequency/Duration: 2 vists/ 2 visits in 4 weeks    Certification date from 03/17/25 to 04/14/25         See note for plan of treatment details and functional goals     Yolis Guzman, PT                         I CERTIFY THE NEED FOR THESE SERVICES FURNISHED UNDER        THIS PLAN OF TREATMENT AND WHILE UNDER MY CARE .             Physician Signature               Date    X_____________________________________________________                  Referring Provider:  Howard Yañez    Initial Assessment  See Epic Evaluation- Start of Care Date: 02/03/25            PLAN  Continue therapy per current plan of care.    Beginning/End Dates of Progress " Note Reporting Period:  2/3/25 to 03/31/2025    Referring Provider:  Howard Yañez

## 2025-04-02 ENCOUNTER — THERAPY VISIT (OUTPATIENT)
Dept: PHYSICAL THERAPY | Facility: CLINIC | Age: 80
End: 2025-04-02
Attending: ORTHOPAEDIC SURGERY
Payer: COMMERCIAL

## 2025-04-02 DIAGNOSIS — I89.0 LYMPHEDEMA: Primary | Chronic | ICD-10-CM

## 2025-04-02 PROCEDURE — 97140 MANUAL THERAPY 1/> REGIONS: CPT | Mod: GP

## 2025-04-07 ENCOUNTER — THERAPY VISIT (OUTPATIENT)
Dept: PHYSICAL THERAPY | Facility: CLINIC | Age: 80
End: 2025-04-07
Attending: ORTHOPAEDIC SURGERY
Payer: COMMERCIAL

## 2025-04-07 DIAGNOSIS — I89.0 LYMPHEDEMA: Primary | Chronic | ICD-10-CM

## 2025-04-07 PROCEDURE — 97140 MANUAL THERAPY 1/> REGIONS: CPT | Mod: GP

## 2025-04-24 ENCOUNTER — TRANSFERRED RECORDS (OUTPATIENT)
Dept: HEALTH INFORMATION MANAGEMENT | Facility: CLINIC | Age: 80
End: 2025-04-24
Payer: COMMERCIAL

## 2025-04-25 ENCOUNTER — THERAPY VISIT (OUTPATIENT)
Dept: PHYSICAL THERAPY | Facility: CLINIC | Age: 80
End: 2025-04-25
Attending: ORTHOPAEDIC SURGERY
Payer: COMMERCIAL

## 2025-04-25 DIAGNOSIS — I89.0 LYMPHEDEMA: Primary | Chronic | ICD-10-CM

## 2025-04-25 PROCEDURE — 97140 MANUAL THERAPY 1/> REGIONS: CPT | Mod: GP

## 2025-04-29 NOTE — PROGRESS NOTES
DISCHARGE  Reason for Discharge: Patient has met all goals.  No further expectation of progress.  Patient chooses to discontinue therapy.    Equipment Issued: Spandgrip size E MTPs to just below knee, with doubling over foot/ankle. PT did recommed that a custom compression garment like may be needed if her girth continues to climb again in the months to come, and that it may indicate a more chronic edema situation requiring ongoing management needs later on. Pt verbalized understanding, but still did not want to order custom garments at this point in time.     Discharge Plan: Patient to continue home program.    Referring Provider:  Howard Yañez    04/25/25 0500   Appointment Info   Signing clinician's name / credentials Rosario Matute, PT, DPT, CLT   Total/Authorized Visits [employer]   Visits Used 9   Medical Diagnosis s/p L TKA- Aftercare following surgery of the musculoskeletal system (Z47.89)   PT Tx Diagnosis L LE lymphedema s/p L TKA   Other pertinent information D/c today   Progress Note/Certification   Start of Care Date 03/14/25   Onset of illness/injury or Date of Surgery 01/29/25   Therapy Frequency 3x/wk   Predicted Duration 12wks   Certification date from 03/14/25   Certification date to 06/06/25   Progress Note Completed Date 03/14/25   [facility] Authorization Information   Condition type Initial onset (within last 3 months)   Cause of current episode Post Surgical   Type of surgery 5-Joint Replacement   Nature of treatment Rehabilitative   Functional ability No functional limitations   Documented POC (choose all that apply) Measurable short and long term/discharge treatment goals related to physical and functional deficits.;Frequency of treatment visits and treatment activities to address deficit areas.;Patient agrees to program participation including home program   Briefly describe symptoms Edema sxs have much improved and pt feels ready for safe and effective d/c today   How did the  symptoms start 6-wks post-op L TKA   Last 24H: avg pain/symptom intensity  no pain/symptom   Past wk: avg pain/symptom intensity 1/10   Frequency of Symptoms Intermittently (0-25% of the time)   Symptom impact on ADLs Not at all   Condition change since eval Much better   General health reported by patient Very good   GOALS   PT Goals 2;3;4   PT Goal 1   Goal Identifier STG 1   Goal Description Pt and caregiver to be IND in CGB technique for self-care of L LE lymphedema mgmt   Rationale to maximize safety and independence with performance of ADLs and functional tasks   Goal Progress Pt is trialing with spandagrip E only now   Target Date 04/11/25   Date Met 04/11/25   PT Goal 2   Goal Identifier STG 2   Goal Description Pt will show an improvement in skin integrity and complete healing of incision with scar tissue mobilization and IND with self-STM in order to prevent chronic adhesions and promote max return of AROM of L knee flexion   Rationale to maximize safety and independence with performance of ADLs and functional tasks   Goal Progress Upon D/c: 123 degrees flexion   Target Date 05/09/25   Date Met 04/25/25   PT Goal 3   Goal Identifier LTG 1   Goal Description Pt will independetly manage don/doff and long-term mgmt of compression garments to promote IND return to previous recreational activities in the community.   Rationale to maximize safety and independence with performance of ADLs and functional tasks   Goal Progress Pt edema was managed with spandagrip and pt chose to keep using this if needed and not order a custom compression garment   Target Date 06/06/25   Date Met 04/25/25   PT Goal 4   Goal Identifier LTG 4 - LLIS   Goal Description Pt will show a statistically significant improvement in her LLIS score with at least an 8pt decrease indicating improvement in overall QOL on max return to her PLOF   Rationale to maximize safety and independence with performance of ADLs and functional tasks   Goal  Progress Upon D/c: LLIS score of 3   Target Date 06/06/25   Date Met 04/25/25   Subjective Report   Subjective Report Pt trialled just spandagrip E for last week, measurements went down slightly, so continue spanda E   Objective Measure 1   Objective Measure B LE girth measurements   Details Girth decreased by 3.9% over the past week - see flowsheet for details   Manual Therapy   Manual Therapy: Mobilization, MFR, MLD, friction massage minutes (50047) 56   Manual Therapy 1 PT continues to recommend pt do self-MLD daily to alleviate thigh edema caused by GCB of the lower leg. Pt reports she has been doing the self-MLD to her L LE 2-3x/day. PT applied MLD to L LE today, as she still has some warmth/jt effusion in the knee, but it's improving to just around the incision. PT edu/instructed pt in self-MLD in-conjuntion with providig more hands-on MLD tx to feel the quality of the stroke, deeper over lymph nodes vs lighter over vessels, etc..   Manual Therapy 1 - Details PT applied cetaphil lotion to L LE. Back to resuming Spandgrip size E MTPs to just below knee, with doubling over foot/ankle. PT did recommed that a custom compression garment like may be needed if her girth continues to climb again in the months to come, and that it may indicate a more chronic edema situation requiring ongoing management needs later on. Pt verbalized understanding, but still did not want to order custom garments at this point in time.   Skilled Intervention Complete CDT   Patient Response/Progress Pt reports compression is comfortable   Education   Learner/Method Patient;Listening;Reading;Demonstration;Pictures/Video;No Barriers to Learning   Plan   Home program place spandagrip size E daily, self-MLD to L LE x1-2/day, HEP x2-3/day   Plan for next session Complete CDT - Continue spandagrip E to L LE, MLD   Total Session Time   Timed Code Treatment Minutes 56   Total Treatment Time (sum of timed and untimed services) 56

## (undated) DEVICE — BONE CEMENT MIXEVAC III HI VAC KIT  0206-015-000

## (undated) DEVICE — BONE CLEANING TIP INTERPULSE  0210-010-000

## (undated) DEVICE — TAPE MEDIPORE 4"X10YD 2964

## (undated) DEVICE — GLOVE BIOGEL PI MICRO SZ 8.0 48580

## (undated) DEVICE — BNDG COBAN 6"X5YDS STERILE

## (undated) DEVICE — BLADE SAW SAGITTAL STRK 18X90X1.27MM HD SYS 6 6118-127-090

## (undated) DEVICE — SUCTION TIP FLEXI CLEAR TIP DISP K62

## (undated) DEVICE — DECANTER VIAL 2006S

## (undated) DEVICE — DRAPE STERI U 1015

## (undated) DEVICE — ESU PENCIL SMOKE EVAC W/ROCKER SWITCH 0703-047-000

## (undated) DEVICE — TOURNIQUET SGL  BLADDER 30" DL PORT BLUE 5921-030-235

## (undated) DEVICE — NDL 18GA 1.5" 305196

## (undated) DEVICE — Device

## (undated) DEVICE — DRAPE SHEET REV FOLD 3/4 9349

## (undated) DEVICE — BLADE CLIPPER 4406

## (undated) DEVICE — DRSG DRAIN 4X4" 7086

## (undated) DEVICE — SUCTION MANIFOLD NEPTUNE 2 SYS 4 PORT 0702-020-000

## (undated) DEVICE — SYR 50ML LL W/O NDL 309653

## (undated) DEVICE — KIT DRAIN CLOSED WOUND SUCTION MED 400ML RESVR

## (undated) DEVICE — PREP CHLORAPREP 26ML TINTED HI-LITE ORANGE 930815

## (undated) DEVICE — GLOVE BIOGEL PI MICRO INDICATOR UNDERGLOVE SZ 8.5 48985

## (undated) DEVICE — SOL NACL 0.9% IRRIG 3000ML BAG 07972-08

## (undated) DEVICE — SU VICRYL 2-0 CT-1 36" UND J945H

## (undated) DEVICE — HOOD T4 PROTECTIVE STERI FACE SHIELD 400-800

## (undated) DEVICE — STOCKING SLEEVE COMPRESSION CALF MED

## (undated) DEVICE — SOL NACL 0.9% IRRIG 1000ML BOTTLE 07138-09

## (undated) DEVICE — SUCTION IRR SYSTEM W/TIP INTERPULSE

## (undated) DEVICE — DRAPE POUCH INSTRUMENT 3 POCKET 1018L

## (undated) DEVICE — BNDG ELASTIC 6" DBL LENGTH UNSTERILE 6611-16

## (undated) DEVICE — SU PDO 1 STRATAFIX 36X36CM CTX TAPERPOINT SXPD2B405

## (undated) DEVICE — DRSG STERI STRIP 1/2X4" R1547

## (undated) DEVICE — GOWN IMPERVIOUS SPECIALTY XLG/XLONG 32474

## (undated) DEVICE — SOL WATER IRRIG 1000ML BOTTLE 07139-09

## (undated) DEVICE — DRSG AQUACEL AG 3.5X9.75" HYDROFIBER 412011

## (undated) DEVICE — GLOVE BIOGEL PI MICRO SZ 7.0 48570

## (undated) DEVICE — GLOVE BIOGEL PI MICRO INDICATOR UNDERGLOVE SZ 7.5 48975

## (undated) DEVICE — SU STRATAFIX MONOCRYL 3-0 SPIRAL PS-2 30CM SXMP1B106

## (undated) RX ORDER — ACETAMINOPHEN 325 MG/1
TABLET ORAL
Status: DISPENSED
Start: 2025-01-29

## (undated) RX ORDER — TRANEXAMIC ACID 650 MG/1
TABLET ORAL
Status: DISPENSED
Start: 2025-01-29

## (undated) RX ORDER — FENTANYL CITRATE 50 UG/ML
INJECTION, SOLUTION INTRAMUSCULAR; INTRAVENOUS
Status: DISPENSED
Start: 2025-01-29